# Patient Record
Sex: FEMALE | Race: WHITE | Employment: OTHER | URBAN - METROPOLITAN AREA
[De-identification: names, ages, dates, MRNs, and addresses within clinical notes are randomized per-mention and may not be internally consistent; named-entity substitution may affect disease eponyms.]

---

## 2017-02-02 ENCOUNTER — OFFICE VISIT (OUTPATIENT)
Dept: INTERNAL MEDICINE CLINIC | Age: 77
End: 2017-02-02

## 2017-02-02 VITALS
TEMPERATURE: 98 F | SYSTOLIC BLOOD PRESSURE: 125 MMHG | WEIGHT: 137 LBS | HEART RATE: 84 BPM | BODY MASS INDEX: 22.02 KG/M2 | RESPIRATION RATE: 16 BRPM | OXYGEN SATURATION: 96 % | DIASTOLIC BLOOD PRESSURE: 71 MMHG | HEIGHT: 66 IN

## 2017-02-02 DIAGNOSIS — I10 ESSENTIAL HYPERTENSION: ICD-10-CM

## 2017-02-02 DIAGNOSIS — J44.1 COPD EXACERBATION (HCC): Primary | ICD-10-CM

## 2017-02-02 RX ORDER — PREDNISONE 20 MG/1
TABLET ORAL
Qty: 30 TAB | Refills: 0 | Status: SHIPPED | OUTPATIENT
Start: 2017-02-02 | End: 2017-06-06 | Stop reason: ALTCHOICE

## 2017-02-02 RX ORDER — DOXYCYCLINE 100 MG/1
100 CAPSULE ORAL 2 TIMES DAILY
Qty: 20 CAP | Refills: 0 | Status: SHIPPED | OUTPATIENT
Start: 2017-02-02 | End: 2017-06-06 | Stop reason: ALTCHOICE

## 2017-02-02 NOTE — PROGRESS NOTES
HISTORY OF PRESENT ILLNESS  Kathi Gillespie is a 78 y.o. female. HPI     She complains of chest congestion and productive cough that have been present for the past 6 days. Pt took Elinda Free and stopped this on 2/1/17- she is not sure if Zpak helps. She returned from [facility] and was recently on a plane. Reports she washes her hands a lot when she is traveling. Pt will follow up with pulmonologist in 03/2017. She does not feel like in between antibiotics she has a lot of congestion. Pt notices pain with exercising and takes Albuterol prior to this. She uses Symbicort and Tudorza inhalers. Reports most of the time she can decrease exercise and push through this. Hypertension ROS: taking medications as instructed, no medication side effects noted, no TIA's, no chest pain on exertion, no dyspnea on exertion, no swelling of ankles. New concerns: Stable- bp was 125/71 in the office today. Reports she has a tear in left hip and will get injection. Review of Systems   HENT: Positive for congestion. Respiratory: Positive for cough and sputum production. All other systems reviewed and are negative. Physical Exam   Constitutional: She is oriented to person, place, and time. She appears well-developed and well-nourished. HENT:   Head: Normocephalic and atraumatic. Right Ear: External ear normal.   Left Ear: External ear normal.   Nose: Nose normal.   Mouth/Throat: Oropharynx is clear and moist.   Eyes: Conjunctivae and EOM are normal.   Neck: Normal range of motion. Neck supple. Carotid bruit is not present. No thyroid mass and no thyromegaly present. Cardiovascular: Normal rate, regular rhythm, S1 normal, S2 normal, normal heart sounds and intact distal pulses. Pulmonary/Chest: Effort normal. She has wheezes (mild). Abdominal: Soft. Normal appearance and bowel sounds are normal. There is no hepatosplenomegaly. There is no tenderness. Musculoskeletal: Normal range of motion.    Neurological: She is alert and oriented to person, place, and time. She has normal strength. No cranial nerve deficit or sensory deficit. Coordination normal.   Skin: Skin is warm, dry and intact. No abrasion and no rash noted. Psychiatric: She has a normal mood and affect. Her behavior is normal. Judgment and thought content normal.   Nursing note and vitals reviewed. ASSESSMENT and PLAN  Bibiana was seen today for nasal congestion. Diagnoses and all orders for this visit:    COPD exacerbation ([facility] [address]  I do not hear pneumonia although do hear some wheezes- pt to begin taking Doxycycline and Prednisone taper as directed. Pt has Doxy and Prednsione at home to have when she travels. Pt should start taking Doxy and Prednisone when she is away because Romana Pearson does not help. I told pt that if she develops illnesses every month she should discuss preventative antibiotics with pulmonologist. Pt is asymptomatic in between flares therefore she is not at this point yet. -     predniSONE (DELTASONE) 20 mg tablet; 3 qd for 4 days, 2 qd for 5 days, 1 qd for 5 days, 1/2 qd for 3 days  -     doxycycline (MONODOX) 100 mg capsule; Take 1 Cap by mouth two (2) times a day. Essential hypertension  BP is at goal. I do not recommend any change in medications. Advised pt that she should continue bp medications as her bp is not really low. I told pt that with age and decreased appetite/eating some pt's have to stop taking bp medication. reviewed medications and side effects in detail     Written by Germán Clarke, as dictated by Gigi Roger MD.     Current diagnosis and concerns discussed with pt at length. Understands risks and benefits or current treatment plan and medications and accepts the treatment and medication with any possible risks. Pt asks appropriate questions which were answered. Pt instructed to call with any concerns or problems.

## 2017-02-28 ENCOUNTER — OFFICE VISIT (OUTPATIENT)
Dept: INTERNAL MEDICINE CLINIC | Age: 77
End: 2017-02-28

## 2017-02-28 VITALS
HEART RATE: 58 BPM | WEIGHT: 137 LBS | SYSTOLIC BLOOD PRESSURE: 128 MMHG | DIASTOLIC BLOOD PRESSURE: 57 MMHG | OXYGEN SATURATION: 98 % | HEIGHT: 66 IN | RESPIRATION RATE: 18 BRPM | TEMPERATURE: 98 F | BODY MASS INDEX: 22.02 KG/M2

## 2017-02-28 DIAGNOSIS — J44.9 CHRONIC OBSTRUCTIVE PULMONARY DISEASE, UNSPECIFIED COPD TYPE (HCC): Primary | ICD-10-CM

## 2017-02-28 DIAGNOSIS — I10 ESSENTIAL HYPERTENSION: ICD-10-CM

## 2017-02-28 NOTE — PROGRESS NOTES
HISTORY OF PRESENT ILLNESS  April Gonzalez is a 78 y.o. female. HPI    At her 2/2/17 office visit she complained of chest congestion and productive cough. Pt was recently on a plane and took Zpak with no relief. I prescribed pt Doxycycline and Prednisone at this time. Pt emailed me on 2/6/17 reporting chest tightness and difficulty breathing after eating. She then emailed me on 2/15/17 stating she finished abx and steroids and still had a productive cough and scratchy throat and I told pt to begin taking Mucinex and use Vicks vapor rub. Today pt complains of a cough and shortness of breath. Pt saw pulmonologist on 2/7/17 who prescribed her Caralyn Laverne and she did not take this because it did not help in the past. Pt states that she took another round of Doxycycline two weeks ago. Reports her cough has gotten deeper and she has pain with coughing. Pt states that she gets short of breath when she is moving around. Her pulmonologist referred pt to cardiologist after pt reported exertional chest pain. Pt states she will have stress test performed. Reports she had an episode of left-sided chest pain when walking up the stairs. She states that this has happened 4 times. Pt states that this pain resolves when she lays on her left side. Hypertension ROS: taking medications as instructed, no medication side effects noted, no TIA's, no chest pain on exertion, no dyspnea on exertion, no swelling of ankles. New concerns: Stable- bp was 128/57 today in the office. Pt states her 9year old granddaughter in [place] was recently diagnosed with rhabdomyosarcoma. Pt wants to go down there to be with her and needs to get healthy. Review of Systems   Respiratory: Positive for cough and shortness of breath. All other systems reviewed and are negative. Physical Exam   Constitutional: She is oriented to person, place, and time. She appears well-developed and well-nourished. HENT:   Head: Normocephalic and atraumatic.    Right Ear: External ear normal.   Left Ear: External ear normal.   Nose: Nose normal.   Mouth/Throat: Oropharynx is clear and moist.   Eyes: Conjunctivae and EOM are normal.   Neck: Normal range of motion. Neck supple. Carotid bruit is not present. No thyroid mass and no thyromegaly present. Cardiovascular: Normal rate, regular rhythm, S1 normal, S2 normal, normal heart sounds and intact distal pulses. Pulmonary/Chest: Effort normal and breath sounds normal.   Abdominal: Soft. Normal appearance and bowel sounds are normal. There is no hepatosplenomegaly. There is no tenderness. Musculoskeletal: Normal range of motion. Neurological: She is alert and oriented to person, place, and time. She has normal strength. No cranial nerve deficit or sensory deficit. Coordination normal.   Skin: Skin is warm, dry and intact. No abrasion and no rash noted. Psychiatric: She has a normal mood and affect. Her behavior is normal. Judgment and thought content normal.   Nursing note and vitals reviewed. ASSESSMENT and PLAN  Bibiana was seen today for cough and shortness of breath. Diagnoses and all orders for this visit:    Chronic obstructive pulmonary disease, unspecified COPD type ([address]  I told pt that pulmonologist wanted her to take Itzel Mew as this would cover organisms that Doxycycline will not. Pt has already taken two rounds of Doxycycline and I suggested she take Zpak. Essential hypertension  BP is at goal. I do not recommend any change in medications. Advised pt that she should see cardiologist for exertional chest pain. reviewed medications and side effects in detail     Written by Storm Koyanagi, as dictated by Hair Bustamante MD.     Current diagnosis and concerns discussed with pt at length. Understands risks and benefits or current treatment plan and medications and accepts the treatment and medication with any possible risks. Pt asks appropriate questions which were answered.  Pt instructed to call with any concerns or problems.

## 2017-06-06 ENCOUNTER — OFFICE VISIT (OUTPATIENT)
Dept: INTERNAL MEDICINE CLINIC | Age: 77
End: 2017-06-06

## 2017-06-06 VITALS
HEIGHT: 66 IN | TEMPERATURE: 98 F | OXYGEN SATURATION: 97 % | DIASTOLIC BLOOD PRESSURE: 60 MMHG | BODY MASS INDEX: 20.54 KG/M2 | SYSTOLIC BLOOD PRESSURE: 104 MMHG | RESPIRATION RATE: 14 BRPM | HEART RATE: 74 BPM | WEIGHT: 127.8 LBS

## 2017-06-06 DIAGNOSIS — I10 ESSENTIAL HYPERTENSION: ICD-10-CM

## 2017-06-06 DIAGNOSIS — Z13.39 SCREENING FOR ALCOHOLISM: ICD-10-CM

## 2017-06-06 DIAGNOSIS — Z13.31 SCREENING FOR DEPRESSION: ICD-10-CM

## 2017-06-06 DIAGNOSIS — Z00.00 ROUTINE GENERAL MEDICAL EXAMINATION AT A HEALTH CARE FACILITY: ICD-10-CM

## 2017-06-06 DIAGNOSIS — Z00.00 MEDICARE ANNUAL WELLNESS VISIT, INITIAL: Primary | ICD-10-CM

## 2017-06-06 DIAGNOSIS — J44.9 CHRONIC OBSTRUCTIVE PULMONARY DISEASE, UNSPECIFIED COPD TYPE (HCC): ICD-10-CM

## 2017-06-06 DIAGNOSIS — E78.5 SERUM LIPIDS HIGH: ICD-10-CM

## 2017-06-06 DIAGNOSIS — I25.10 CORONARY ARTERY DISEASE INVOLVING NATIVE HEART WITHOUT ANGINA PECTORIS, UNSPECIFIED VESSEL OR LESION TYPE: ICD-10-CM

## 2017-06-06 DIAGNOSIS — Z71.89 ADVANCED CARE PLANNING/COUNSELING DISCUSSION: ICD-10-CM

## 2017-06-06 RX ORDER — METOPROLOL SUCCINATE 50 MG/1
TABLET, EXTENDED RELEASE ORAL
COMMUNITY
Start: 2017-04-21 | End: 2017-11-10 | Stop reason: ALTCHOICE

## 2017-06-06 RX ORDER — LISINOPRIL AND HYDROCHLOROTHIAZIDE 10; 12.5 MG/1; MG/1
1 TABLET ORAL DAILY
Qty: 90 TAB | Refills: 1 | Status: SHIPPED | OUTPATIENT
Start: 2017-06-06 | End: 2018-01-10 | Stop reason: SDUPTHER

## 2017-06-06 RX ORDER — ATORVASTATIN CALCIUM 20 MG/1
40 TABLET, FILM COATED ORAL DAILY
COMMUNITY
Start: 2017-04-24 | End: 2018-04-03

## 2017-06-06 NOTE — PATIENT INSTRUCTIONS
Medicare Part B Preventive Services Guidelines/Limitations Date last completed and Frequency Due Date   Bone Mass Measurement  (age 72 & older, biennial) Requires diagnosis related to osteoporosis or estrogen deficiency. Biennial benefit unless patient has history of long-term glucocorticoid tx or baseline is needed because initial test was by other method Completed 5/2015    Recommended every 2 years As recommended by your PCP or Specialist     Cardiovascular Screening Blood Tests (every 5 years)  Total cholesterol, HDL, Triglycerides Order as a panel if possible Completed 5/2016    As recommended by your PCP As recommended by your PCP or Specialist   Colorectal Cancer Screening  -Fecal occult blood test (annual)  -Flexible sigmoidoscopy (5y)  -Screening colonoscopy (10y)  -Barium Enema Age 49-80; After age [de-identified] if history of abnormal results Completed 8/31/2016     Recommended follow-up in 10 years As recommended by your PCP or Specialist     Counseling to Prevent Tobacco Use (up to 8 sessions per year)  - Counseling greater than 3 and up to 10 minutes  - Counseling greater than 10 minutes Patients must be asymptomatic of tobacco-related conditions to receive as preventive service N/A N/A   Diabetes Screening Tests (at least every 3 years, Medicare covers annually or at 6-month intervals for prediabetic patients)    Fasting blood sugar (FBS) or glucose tolerance test (GTT) Patient must be diagnosed with one of the following:  -Hypertension, Dyslipidemia, obesity, previous impaired FBS or GTT  Or any two of the following: overweight, FH of diabetes, age ? 72, history of gestational diabetes, birth of baby weighing more than 9 pounds Completed 11/2016    Recommended every 3 years for non-diabetics     As recommended by your PCP or Specialist     Glaucoma Screening (no USPSTF recommendation) Diabetes mellitus, family history, , age 48 or over,  American, age 72 or over Completed within the last year    Recommended annually As recommended by your PCP or Specialist   Seasonal Influenza Vaccination (annually)  Completed 9/2016    Recommended Annually Completed for 2016 flu season. TDAP Vaccination  As recommended by your PCP or Specialist    Recommended every 10 years As recommended by your PCP or Specialist   Zoster (Shingles) Vaccination Covered by Medicare Part D through the pharmacy- PCP provides prescription Never received    Recommended once over age 48  As recommended by your PCP or Specialist   Pneumococcal Vaccination (once after 72)  Pneumo 23- 1/2009  Recommended once over the age of 72    Prevnar 15- 11/2015 Recommended once over the age of 72 Complete        Complete   Screening Mammography (biennial age 54-69) Annually (age 36 or over) Completed 2/2017   As recommended by your PCP or Specialist     Screening Pap Tests and Pelvic Examination (up to age 79 and after 79 if unknown history or abnormal study last 8 years) Every 25 months except high risk As recommended by your PCP or Specialist   As recommended by your PCP or Specialist     Ultrasound Screening for Abdominal Aortic Aneurysm (AAA) (once) Patient must be referred through IPPE and not have had a screening for abdominal aortic aneurysm before under Medicare. Limited to patients who meet one of the following criteria:  - Men who are 73-68 years old and have smoked more than 100 cigarettes in their lifetime.  -Anyone with a FH of AAA  -Anyone recommended for screening by USPSTF Not indicated unless recommended by PCP   Not indicated unless recommended by PCP     Family Practice Management 2011    If you have any questions or concerns please feel free to contact me at [phone]. It was a pleasure meeting you today and participating in your healthcare.   Fabien Valentine RN

## 2017-06-06 NOTE — PROGRESS NOTES
HISTORY OF PRESENT ILLNESS  Albino García is a 78 y.o. female. HPI  Patient is coming in for follow up of chronic conditions. Patient reports that she had a triple bypass surgery on 3/22/2017. She was returning from a cruise in February and notes that she had SOB. She states that she had trouble breathing when climbing stairs and laid on her left side because it resolved the breathing. She reports that she took Albuterol and made an appointment for the pulmonologist who referred her to a cardiologist. Patient reports that she had trouble getting an appointment. She states that the specialist prescribed Nitroglycerin for the weekend. Patient however, still complained of chest pain and SOB. She had a normal stress test 2 years prior so specialist decided to do catheterization. This indicated 3 vessel disease and she was hospitalized until she had her triple bypass surgery. Patient reports that she attends cardiac rehab 3 days a week. Her SOB is resolved, but gets slightly winded when exerting energy (walking up hills). She reports that she is taking her medication regularly. Patient reports that she is eating and has an appetite. She reports that she has stopped Plavix and is taking 40 mg Aspirin. She is also taking statin. Hypertension ROS: taking medications as instructed, no medication side effects noted, no TIA's, no chest pain on exertion, no dyspnea on exertion, no swelling of ankles. New concerns:  Patient's BP in office today is 104/60. Hyperlipidemia:  Cardiovascular risk analysis - 78 y.o. female LDL goal is under 100. ROS: taking medications as instructed, no medication side effects noted, no TIA's, no chest pain on exertion, no dyspnea on exertion, no swelling of ankles. Tolerating meds, no myalgias or other side effects noted  New concerns: LDL was 117 on 5/9/2017. Patient declined treatment for depression.      Review of Systems   All other systems reviewed and are negative. Physical Exam   Constitutional: She is oriented to person, place, and time. She appears well-developed and well-nourished. HENT:   Head: Normocephalic and atraumatic. Right Ear: External ear normal.   Left Ear: External ear normal.   Nose: Nose normal.   Mouth/Throat: Oropharynx is clear and moist.   Eyes: Conjunctivae and EOM are normal.   Neck: Normal range of motion. Neck supple. Carotid bruit is not present. No thyroid mass and no thyromegaly present. Cardiovascular: Normal rate, regular rhythm, S1 normal, S2 normal, normal heart sounds and intact distal pulses. Pulmonary/Chest: Effort normal and breath sounds normal.   Abdominal: Soft. Normal appearance and bowel sounds are normal. There is no hepatosplenomegaly. There is no tenderness. Musculoskeletal: Normal range of motion. Neurological: She is alert and oriented to person, place, and time. She has normal strength. No cranial nerve deficit or sensory deficit. Coordination normal.   Skin: Skin is warm, dry and intact. No abrasion and no rash noted. Psychiatric: She has a normal mood and affect. Her behavior is normal. Judgment and thought content normal.   Nursing note and vitals reviewed. ASSESSMENT and Tarun Mitchell was seen today for follow-up. Diagnoses and all orders for this visit:    Coronary artery disease involving native heart without angina pectoris, unspecified vessel or lesion type  Improved with triple bypass and cardiac rehabe 3 days a week. Continue aspirin 40 mg and Lipitor 20mg. Chronic obstructive pulmonary disease, unspecified COPD type ([address]  Stable and her SOB has improved since triple bypass. Essential hypertension  BP is stable. Continue current meds. Serum lipids high  LDL was 117 May 2016. Recently at s/p triple bypass. Continue Lipitor. Other orders  -     lisinopril-hydroCHLOROthiazide (PRINZIDE, ZESTORETIC) 10-12.5 mg per tablet; Take 1 Tab by mouth daily.       lab results and schedule of future lab studies reviewed with patient  reviewed diet, exercise and weight control    Written by Starlene Kanner, as dictated by Diana Chavez MD.     Current diagnosis and concerns discussed with pt at length. Understands risks and benefits or current treatment plan and medications and accepts the treatment and medication with any possible risks. Pt asks appropriate questions which were answered. Pt instructed to call with any concerns or problems.

## 2017-06-06 NOTE — PROGRESS NOTES
Nurse Navigator Medicare Wellness Visit performed by JOSE ALFREDO Olivares    This is an Initial Dahlia Exam (AWV) (Performed 12 months after IPPE or effective date of Medicare Part B enrollment, Once in a lifetime)    I have reviewed the patient's medical history in detail and updated the computerized patient record. History     Past Medical History:   Diagnosis Date    Asthma 10/11/2012    GERD (gastroesophageal reflux disease) 4/1/2009    HTN 4/1/2009    Menopausal state 4/1/2009    Serum lipid, elevated 4/1/2009      Past Surgical History:   Procedure Laterality Date    COLONOSCOPY N/A 8/31/2016    COLONOSCOPY performed by Debbie Ward MD at [facility] HX CHOLECYSTECTOMY  2012     Current Outpatient Prescriptions   Medication Sig Dispense Refill    atorvastatin (LIPITOR) 20 mg tablet Take 40 mg by mouth daily.  metoprolol succinate (TOPROL-XL) 50 mg XL tablet       lisinopril-hydroCHLOROthiazide (PRINZIDE, ZESTORETIC) 10-12.5 mg per tablet Take 1 Tab by mouth daily. 90 Tab 1    KRILL OIL PO Take  by mouth daily.  budesonide-formoterol (SYMBICORT) 160-4.5 mcg/actuation HFA inhaler Take 2 Puffs by inhalation two (2) times a day. 3 Inhaler 1    magnesium 250 mg tab Take 500 mg by mouth daily.  aclidinium bromide (TUDORZA PRESSAIR) 400 mcg/actuation inhaler Take 1 Puff by inhalation.  cholecalciferol (VITAMIN D3) 1,000 unit tablet Take  by mouth daily.  albuterol (PROVENTIL HFA, VENTOLIN HFA) 90 mcg/actuation inhaler Take 2 Puffs by inhalation every four (4) hours as needed for Wheezing. 1 Inhaler 2    CALCIUM CARBONATE/VITAMIN D3 (CALCIUM + D PO) Take  by mouth.  ASPIRIN 81 mg chewable tablet Take 81 mg by mouth every other day.  lisinopril-hydroCHLOROthiazide (PRINZIDE, ZESTORETIC) 10-12.5 mg per tablet Take 1 Tab by mouth daily for 90 days.  90 Tab 1     Allergies   Allergen Reactions    Augmentin [Amoxicillin-Pot Clavulanate] Nausea Only     Family History   Problem Relation Age of Onset    Heart Attack Mother     Hypertension Mother     High Cholesterol Mother     Hypertension Father     Hypertension Brother      Social History   Substance Use Topics    Smoking status: Former Smoker     Packs/day: 1.00     Years: 20.00     Types: Cigarettes     Quit date: 1/1/1980    Smokeless tobacco: Never Used    Alcohol use 0.6 oz/week     1 Glasses of wine per week      Comment: daily glass of wine     Patient Active Problem List   Diagnosis Code    HTN (hypertension) I10    Serum lipids high E78.5    GERD (gastroesophageal reflux disease) K21.9    Menopausal state N95.1    Asthma J45.909    ACP (advance care planning) Z71.89    Chronic obstructive pulmonary disease (HCC) J44.9         Depression Risk Factor Screening:   Patient denies feelings of being down, depressed or hopeless at this time. Patient states that they have a strong support system within their family & friends. PHQ over the last two weeks 6/6/2017   Little interest or pleasure in doing things Not at all   Feeling down, depressed or hopeless Not at all   Total Score PHQ 2 0     Alcohol Risk Factor Screening: On any occasion during the past 3 months, have you had more than 3 drinks containing alcohol? No    Do you average more than 7 drinks per week? No    Functional Ability and Level of Safety:     Hearing Loss   normal-to-mild    Activities of Daily Living   Self-care. Patient states that she lives with her  in a private residence. Patient states independence in all ADLs & denies the use of assistive devices for ambulation. NN encouraged patient to continue and/ or introduce routine physical exercise into their daily routine as applicable & as recommended by PCP. Patient verbalized understanding & agreement to take this into consideration; however, patient shares that she had triple bypass surgery on 3/22/2017 & that she attends cardiac rehab 3 days a week. Patient adds that her shortness of breath has resolved, but she still gets slightly winded when exerting energy (i.e. walking up hills). Patient states that she will discuss this in more detail with PCP today. Requires assistance with:   ADL Assessment 6/6/2017   Feeding yourself No Help Needed   Getting from bed to chair No Help Needed   Getting dressed No Help Needed   Bathing or showering No Help Needed   Walk across the room (includes cane/walker) No Help Needed   Using the telphone No Help Needed   Taking your medications No Help Needed   Preparing meals No Help Needed   Managing money (expenses/bills) No Help Needed   Moderately strenuous housework (laundry) No Help Needed   Shopping for personal items (toiletries/medicines) No Help Needed   Shopping for groceries No Help Needed   Driving No Help Needed   Climbing a flight of stairs No Help Needed   Getting to places beyond walking distances No Help Needed       Fall Risk   Patient denies falls within the past year & verbalizes awareness of fall prevention strategies. Fall Risk Assessment, last 12 mths 6/6/2017   Able to walk? Yes   Fall in past 12 months? No     Abuse Screen   Patient is not abused    Review of Systems   Medicare Wellness Visit    Physical Examination     No exam data present    Evaluation of Cognitive Function:  Mood/affect:  happy  Appearance: age appropriate and casually dressed  Family member/caregiver input: None present; however, patient reports a strong support system. No exam performed today, Medicare Wellness Visit.     Patient Care Team:  Tracy Andersen MD as PCP - Rahel Cline MD (Pulmonary Disease)    Advice/Referrals/Counseling   Education and counseling provided:  End-of-Life planning (with patient's consent)  Pneumococcal Vaccine  Influenza Vaccine  Screening Mammography  Screening Pap and pelvic (covered once every 2 years)  Colorectal cancer screening tests  Bone mass measurement (DEXA)  Screening for glaucoma  tdap & shingles vaccinations      Assessment/Plan   1. A copy of patient's completed Advanced Medical Directive is on file in the patient's medical record. NN reviewed Advanced Medical Directive document with patient & patient denies the need for changes/ updates. 2. Patient is up to date on the following immunizations:  Immunization History   Administered Date(s) Administered    Influenza High Dose Vaccine PF 09/22/2016    Influenza Vaccine 10/01/2014    Pneumococcal Conjugate (PCV-13) 11/09/2015    Pneumococcal Vaccine (Unspecified Type) 01/01/2009   Patient confirmed the aforementioned preventative immunization dates are correct. Patient is unable to recall the date of their last tdap vaccine. NN encouraged patient to check home records & if information obtained, to please notify PCP's office with the details. Patient verbalized agreement. Patient denies receiving a shingles vaccine in the past & patient denies ever having a case of shingles in the past. NN encouraged patient to check home records & if information obtained, to please notify PCP's office with the details. Patient verbalized agreement. 3. Patient states that she follows the PCP's recommendations for the following screenings: Mammography (report on file from 2/2016), pap/ pelvic, DEXA scan (report on file from 5/2015) & colonoscopy (report on file from 8/31/2016 performed by Dr. Joycelyn Woods at [facility] with recommended screening follow-up in 10 years). Patient confirmed the aforementioned preventative screening dates. Patient reports completing a screening mammogram 2/2017 at the [facility]. [facility] faxed requesting a copy of patient's last screening mammography report with patient's verbal approval.     4. Patient wears corrective lenses. Patient reports having a routine eye exam & glaucoma screening within the last year performed by Dr. Moe Nesbitt.  [facility] faxed requesting a copy of patient's last eye exam with glaucoma screening with patient's verbal approval.     Patient verbalized understanding of all information discussed. Patient was given the opportunity to ask questions. Medication reconciliation completed by MA/ LPN and reviewed by PCP. Patient provided AVS which includes Medicare Wellness Preventative Screening Table.

## 2017-08-08 DIAGNOSIS — J44.1 COPD EXACERBATION (HCC): ICD-10-CM

## 2017-08-08 RX ORDER — DOXYCYCLINE 100 MG/1
100 CAPSULE ORAL 2 TIMES DAILY
Qty: 20 CAP | Refills: 0 | Status: SHIPPED | OUTPATIENT
Start: 2017-08-08 | End: 2017-11-10 | Stop reason: ALTCHOICE

## 2017-08-08 RX ORDER — PREDNISONE 20 MG/1
TABLET ORAL
Qty: 30 TAB | Refills: 0 | Status: SHIPPED | OUTPATIENT
Start: 2017-08-08 | End: 2017-11-10 | Stop reason: ALTCHOICE

## 2017-11-10 ENCOUNTER — OFFICE VISIT (OUTPATIENT)
Dept: INTERNAL MEDICINE CLINIC | Age: 77
End: 2017-11-10

## 2017-11-10 VITALS
SYSTOLIC BLOOD PRESSURE: 130 MMHG | WEIGHT: 127 LBS | HEART RATE: 84 BPM | OXYGEN SATURATION: 99 % | TEMPERATURE: 97.9 F | DIASTOLIC BLOOD PRESSURE: 82 MMHG | BODY MASS INDEX: 20.41 KG/M2 | RESPIRATION RATE: 12 BRPM | HEIGHT: 66 IN

## 2017-11-10 DIAGNOSIS — J44.1 COPD EXACERBATION (HCC): ICD-10-CM

## 2017-11-10 DIAGNOSIS — I10 ESSENTIAL HYPERTENSION: ICD-10-CM

## 2017-11-10 DIAGNOSIS — J40 BRONCHITIS: Primary | ICD-10-CM

## 2017-11-10 RX ORDER — LEVOFLOXACIN 500 MG/1
500 TABLET, FILM COATED ORAL DAILY
Qty: 10 TAB | Refills: 0 | Status: SHIPPED | OUTPATIENT
Start: 2017-11-10 | End: 2018-01-15 | Stop reason: ALTCHOICE

## 2017-11-10 RX ORDER — PREDNISONE 20 MG/1
TABLET ORAL
Qty: 30 TAB | Refills: 0 | Status: SHIPPED | OUTPATIENT
Start: 2017-11-10 | End: 2018-01-15 | Stop reason: ALTCHOICE

## 2017-11-10 NOTE — PROGRESS NOTES
HISTORY OF PRESENT ILLNESS  Luis Blunt is a 78 y.o. female. HPI  Presents with complaints of persistent chest congestion and loose cough productive of yellow sputum for the past 2 weeks. Symptoms began while she was traveling out of town and she started with her Doxycycline and Prednisone course shortly after symptoms began. Feels like chest is not as tight but cough has remained very loose and productive. Completed 10 day course of Doxycycline 2 days ago. Denies fever, chills. Has not needed to use her Albuterol inhaler with this illness but takes her Symbicort and [place] daily. Was taking Mucinex initially but stopped after several days. At one time while coughing, she felt flushed and took her blood pressure with home cuff 160/94. She has been concerned about elevation of blood pressure but denies severe headache, vision changes, chest pain. Has not missed any of her blood pressure medication. Review of Systems   Constitutional: Positive for malaise/fatigue. Negative for chills and fever. HENT: Negative for congestion and sore throat. Respiratory: Positive for cough and sputum production. Negative for shortness of breath and wheezing. Cardiovascular: Negative for chest pain, palpitations and leg swelling. Genitourinary: Negative for dysuria and frequency. Musculoskeletal: Negative for joint pain and myalgias. Neurological: Negative for dizziness and headaches. /78 (BP 1 Location: Left arm, BP Patient Position: Sitting)  Pulse 84  Temp 97.9 °F (36.6 °C) (Oral)   Resp 12  Ht 5' 6\" (1.676 m)  Wt 127 lb (57.6 kg)  SpO2 99%  BMI 20.5 kg/m2   Blood pressure rechecked with manual cuff 130/82  Physical Exam   Constitutional: She is oriented to person, place, and time. She appears well-developed and well-nourished. HENT:   Head: Normocephalic and atraumatic.    Right Ear: External ear normal.   Left Ear: External ear normal.   Nose: Nose normal.   Mouth/Throat: Oropharynx is clear and moist.   Eyes: Conjunctivae are normal.   Neck: Normal range of motion. Neck supple. No thyromegaly present. Cardiovascular: Normal rate and regular rhythm. Pulmonary/Chest: Effort normal. She has no wheezes. She has no rales. Loose cough with upper chest congestion   Lymphadenopathy:     She has no cervical adenopathy. Neurological: She is alert and oriented to person, place, and time. Skin: Skin is warm and dry. Psychiatric: She has a normal mood and affect. Her behavior is normal.   Nursing note and vitals reviewed. ASSESSMENT and PLAN  Diagnoses and all orders for this visit:    1. Bronchitis -- has not resolved with 10 day course of Doxycycline; will treat with Levaquin for antibiotic failure; resume use of Mucinex. -     levoFLOXacin (LEVAQUIN) 500 mg tablet; Take 1 Tab by mouth daily. 2. COPD exacerbation (HCC)  -     predniSONE (DELTASONE) 20 mg tablet; 3 qd for 4 days, 2 qd for 5 days, 1 qd for 5 days, 1/2 qd for 3 days    3. Hypertension -- obtained elevated reading at home; may be related to current illness; advised her to bring home BP cuff to her next appointment to check against office readings.       lab results and schedule of future lab studies reviewed with patient  reviewed diet, exercise and weight control  reviewed medications and side effects in detail

## 2017-11-10 NOTE — PATIENT INSTRUCTIONS
Bronchitis: Care Instructions  Your Care Instructions    Bronchitis is inflammation of the bronchial tubes, which carry air to the lungs. The tubes swell and produce mucus, or phlegm. The mucus and inflamed bronchial tubes make you cough. You may have trouble breathing. Most cases of bronchitis are caused by viruses like those that cause colds. Antibiotics usually do not help and they may be harmful. Bronchitis usually develops rapidly and lasts about 2 to 3 weeks in otherwise healthy people. Follow-up care is a key part of your treatment and safety. Be sure to make and go to all appointments, and call your doctor if you are having problems. It's also a good idea to know your test results and keep a list of the medicines you take. How can you care for yourself at home? · Take all medicines exactly as prescribed. Call your doctor if you think you are having a problem with your medicine. · Get some extra rest.  · Take an over-the-counter pain medicine, such as acetaminophen (Tylenol), ibuprofen (Advil, Motrin), or naproxen (Aleve) to reduce fever and relieve body aches. Read and follow all instructions on the label. · Do not take two or more pain medicines at the same time unless the doctor told you to. Many pain medicines have acetaminophen, which is Tylenol. Too much acetaminophen (Tylenol) can be harmful. · Take an over-the-counter cough medicine that contains dextromethorphan to help quiet a dry, hacking cough so that you can sleep. Avoid cough medicines that have more than one active ingredient. Read and follow all instructions on the label. · Breathe moist air from a humidifier, hot shower, or sink filled with hot water. The heat and moisture will thin mucus so you can cough it out. · Do not smoke. Smoking can make bronchitis worse. If you need help quitting, talk to your doctor about stop-smoking programs and medicines. These can increase your chances of quitting for good.   When should you call for help? Call 911 anytime you think you may need emergency care. For example, call if:  ? · You have severe trouble breathing. ?Call your doctor now or seek immediate medical care if:  ? · You have new or worse trouble breathing. ? · You cough up dark brown or bloody mucus (sputum). ? · You have a new or higher fever. ? · You have a new rash. ? Watch closely for changes in your health, and be sure to contact your doctor if:  ? · You cough more deeply or more often, especially if you notice more mucus or a change in the color of your mucus. ? · You are not getting better as expected. Where can you learn more? Go to http://maria r-mason.info/. Enter H333 in the search box to learn more about \"Bronchitis: Care Instructions. \"  Current as of: May 12, 2017  Content Version: 11.4  © 0423-4402 [employer]. Care instructions adapted under license by [employer] (which disclaims liability or warranty for this information). If you have questions about a medical condition or this instruction, always ask your healthcare professional. [place] [address] any warranty or liability for your use of this information. Chronic Obstructive Pulmonary Disease (COPD): Care Instructions  Your Care Instructions    Chronic obstructive pulmonary disease (COPD) is a general term for a group of lung diseases, including emphysema and chronic bronchitis. People with COPD have decreased airflow in and out of the lungs, which makes it hard to breathe. The airways also can get clogged with thick mucus. Cigarette smoking is a major cause of COPD. Although there is no cure for COPD, you can slow its progress. Following your treatment plan and taking care of yourself can help you feel better and live longer. Follow-up care is a key part of your treatment and safety. Be sure to make and go to all appointments, and call your doctor if you are having problems.  It's also a good idea to know your test results and keep a list of the medicines you take. How can you care for yourself at home? ?Staying healthy  ? · Do not smoke. This is the most important step you can take to prevent more damage to your lungs. If you need help quitting, talk to your doctor about stop-smoking programs and medicines. These can increase your chances of quitting for good. ? · Avoid colds and flu. Get a pneumococcal vaccine shot. If you have had one before, ask your doctor whether you need a second dose. Get the flu vaccine every fall. If you must be around people with colds or the flu, wash your hands often. ? · Avoid secondhand smoke, air pollution, and high altitudes. Also avoid cold, dry air and hot, humid air. Stay at home with your windows closed when air pollution is bad. ?Medicines and oxygen therapy  ? · Take your medicines exactly as prescribed. Call your doctor if you think you are having a problem with your medicine. ? · You may be taking medicines such as:  ¨ Bronchodilators. These help open your airways and make breathing easier. Bronchodilators are either short-acting (work for 6 to 9 hours) or long-acting (work for 24 hours). You inhale most bronchodilators, so they start to act quickly. Always carry your quick-relief inhaler with you in case you need it while you are away from home. ¨ Corticosteroids (prednisone, budesonide). These reduce airway inflammation. They come in pill or inhaled form. You must take these medicines every day for them to work well. ? · A spacer may help you get more inhaled medicine to your lungs. Ask your doctor or pharmacist if a spacer is right for you. If it is, ask how to use it properly. ? · Do not take any vitamins, over-the-counter medicine, or herbal products without talking to your doctor first.   ? · If your doctor prescribed antibiotics, take them as directed. Do not stop taking them just because you feel better.  You need to take the full course of antibiotics. ? · Oxygen therapy boosts the amount of oxygen in your blood and helps you breathe easier. Use the flow rate your doctor has recommended, and do not change it without talking to your doctor first.   Activity  ? · Get regular exercise. Walking is an easy way to get exercise. Start out slowly, and walk a little more each day. ? · Pay attention to your breathing. You are exercising too hard if you cannot talk while you are exercising. ? · Take short rest breaks when doing household chores and other activities. ? · Learn breathing methods-such as breathing through pursed lips-to help you become less short of breath. ? · If your doctor has not set you up with a pulmonary rehabilitation program, talk to him or her about whether rehab is right for you. Rehab includes exercise programs, education about your disease and how to manage it, help with diet and other changes, and emotional support. Diet  ? · Eat regular, healthy meals. Use bronchodilators about 1 hour before you eat to make it easier to eat. Eat several small meals instead of three large ones. Drink beverages at the end of the meal. Avoid foods that are hard to chew. ? · Eat foods that contain protein so that you do not lose muscle mass. ? · Talk with your doctor if you gain too much weight or if you lose weight without trying. ?Mental health  ? · Talk to your family, friends, or a therapist about your feelings. It is normal to feel frightened, angry, hopeless, helpless, and even guilty. Talking openly about bad feelings can help you cope. If these feelings last, talk to your doctor. When should you call for help? Call 911 anytime you think you may need emergency care. For example, call if:  ? · You have severe trouble breathing. ?Call your doctor now or seek immediate medical care if:  ? · You have new or worse trouble breathing. ? · You cough up blood. ? · You have a fever. ? Watch closely for changes in your health, and be sure to contact your doctor if:  ? · You cough more deeply or more often, especially if you notice more mucus or a change in the color of your mucus. ? · You have new or worse swelling in your legs or belly. ? · You are not getting better as expected. Where can you learn more? Go to http://maria r-mason.info/. Ritika Donato in the search box to learn more about \"Chronic Obstructive Pulmonary Disease (COPD): Care Instructions. \"  Current as of: May 12, 2017  Content Version: 11.4  © 9397-1031 [employer]. Care instructions adapted under license by [employer] (which disclaims liability or warranty for this information). If you have questions about a medical condition or this instruction, always ask your healthcare professional. [address] any warranty or liability for your use of this information.

## 2017-11-10 NOTE — MR AVS SNAPSHOT
Visit Information Date & Time Provider Department Dept. Phone Encounter #  
 11/10/2017  3:40 PM Christian Bernardo NP [facility] [phone] [id number] Upcoming Health Maintenance Date Due  
 GLAUCOMA SCREENING Q2Y 4/21/2017 Influenza Age 5 to Adult 8/1/2017 MEDICARE YEARLY EXAM 6/7/2018 DTaP/Tdap/Td series (2 - Td) 6/17/2026 COLONOSCOPY 8/31/2026 Allergies as of 11/10/2017  Review Complete On: 11/10/2017 By: Christian Bernardo NP Severity Noted Reaction Type Reactions Augmentin [Amoxicillin-pot Clavulanate]  08/25/2016    Nausea Only Current Immunizations  Never Reviewed Name Date Influenza High Dose Vaccine PF 9/22/2016 Influenza Vaccine 10/1/2014 Pneumococcal Conjugate (PCV-13) 11/9/2015 Pneumococcal Vaccine (Unspecified Type) 1/1/2009 Not reviewed this visit You Were Diagnosed With   
  
 Codes Comments Bronchitis    -  Primary ICD-10-CM: Z14 ICD-9-CM: 826 [facility]     ICD-10-CM: J44.1 ICD-9-CM: 491.21 Vitals BP Pulse Temp Resp Height(growth percentile) Weight(growth percentile) 150/78 (BP 1 Location: Left arm, BP Patient Position: Sitting) 84 97.9 °F (36.6 °C) (Oral) 12 5' 6\" (1.676 m) 127 lb (57.6 kg) SpO2 BMI OB Status Smoking Status 99% 20.5 kg/m2 Postmenopausal Former Smoker BMI and BSA Data Body Mass Index Body Surface Area 20.5 kg/m 2 1.64 m 2 Preferred Pharmacy Pharmacy Name Phone [facility] [address], [address] [phone] Your Updated Medication List  
  
   
This list is accurate as of: 11/10/17  4:11 PM.  Always use your most recent med list.  
  
  
  
  
 albuterol 90 mcg/actuation inhaler Commonly known as:  PROVENTIL HFA, VENTOLIN HFA, PROAIR HFA Take 2 Puffs by inhalation every four (4) hours as needed for Wheezing. aspirin 81 mg chewable tablet Take 81 mg by mouth every other day. atorvastatin 20 mg tablet Commonly known as:  LIPITOR Take 40 mg by mouth daily. budesonide-formoterol 160-4.5 mcg/actuation Hfaa Commonly known as:  SYMBICORT Take 2 Puffs by inhalation two (2) times a day. CALCIUM + D PO Take  by mouth. KRILL OIL PO Take  by mouth daily. levoFLOXacin 500 mg tablet Commonly known as:  Threasa Mince Take 1 Tab by mouth daily. * lisinopril-hydroCHLOROthiazide 10-12.5 mg per tablet Commonly known as:  Katrinka Fat Take 1 Tab by mouth daily for 90 days. * lisinopril-hydroCHLOROthiazide 10-12.5 mg per tablet Commonly known as:  Katrinka Fat Take 1 Tab by mouth daily. magnesium 250 mg Tab Take 500 mg by mouth daily. predniSONE 20 mg tablet Commonly known as:  DELTASONE  
3 qd for 4 days, 2 qd for 5 days, 1 qd for 5 days, 1/2 qd for 3 days TUDORZA PRESSAIR 400 mcg/actuation inhaler Generic drug:  aclidinium bromide Take 1 Puff by inhalation. VITAMIN D3 1,000 unit tablet Generic drug:  cholecalciferol Take  by mouth daily. * Notice: This list has 2 medication(s) that are the same as other medications prescribed for you. Read the directions carefully, and ask your doctor or other care provider to review them with you. Prescriptions Printed Refills  
 predniSONE (DELTASONE) 20 mg tablet 0 Sig: 3 qd for 4 days, 2 qd for 5 days, 1 qd for 5 days, 1/2 qd for 3 days Class: Print Prescriptions Sent to Pharmacy Refills  
 levoFLOXacin (LEVAQUIN) 500 mg tablet 0 Sig: Take 1 Tab by mouth daily. Class: Normal  
 Pharmacy: [address], [address] [place] [address] Rd[address] [facility] Ph #: [phone] Route: Oral  
  
Patient Instructions Bronchitis: Care Instructions Your Care Instructions Bronchitis is inflammation of the bronchial tubes, which carry air to the lungs. The tubes swell and produce mucus, or phlegm. The mucus and inflamed bronchial tubes make you cough. You may have trouble breathing. Most cases of bronchitis are caused by viruses like those that cause colds. Antibiotics usually do not help and they may be harmful. Bronchitis usually develops rapidly and lasts about 2 to 3 weeks in otherwise healthy people. Follow-up care is a key part of your treatment and safety. Be sure to make and go to all appointments, and call your doctor if you are having problems. It's also a good idea to know your test results and keep a list of the medicines you take. How can you care for yourself at home? · Take all medicines exactly as prescribed. Call your doctor if you think you are having a problem with your medicine. · Get some extra rest. 
· Take an over-the-counter pain medicine, such as acetaminophen (Tylenol), ibuprofen (Advil, Motrin), or naproxen (Aleve) to reduce fever and relieve body aches. Read and follow all instructions on the label. · Do not take two or more pain medicines at the same time unless the doctor told you to. Many pain medicines have acetaminophen, which is Tylenol. Too much acetaminophen (Tylenol) can be harmful. · Take an over-the-counter cough medicine that contains dextromethorphan to help quiet a dry, hacking cough so that you can sleep. Avoid cough medicines that have more than one active ingredient. Read and follow all instructions on the label. · Breathe moist air from a humidifier, hot shower, or sink filled with hot water. The heat and moisture will thin mucus so you can cough it out. · Do not smoke. Smoking can make bronchitis worse. If you need help quitting, talk to your doctor about stop-smoking programs and medicines. These can increase your chances of quitting for good. When should you call for help? Call 911 anytime you think you may need emergency care. For example, call if: 
? · You have severe trouble breathing. ?Call your doctor now or seek immediate medical care if: 
? · You have new or worse trouble breathing. ? · You cough up dark brown or bloody mucus (sputum). ? · You have a new or higher fever. ? · You have a new rash. ? Watch closely for changes in your health, and be sure to contact your doctor if: 
? · You cough more deeply or more often, especially if you notice more mucus or a change in the color of your mucus. ? · You are not getting better as expected. Where can you learn more? Go to http://maria r-mason.info/. Enter H333 in the search box to learn more about \"Bronchitis: Care Instructions. \" Current as of: May 12, 2017 Content Version: 11.4 © 9965-4496 [employer]. Care instructions adapted under license by [employer] (which disclaims liability or warranty for this information). If you have questions about a medical condition or this instruction, always ask your healthcare professional. [place] [address] any warranty or liability for your use of this information. Chronic Obstructive Pulmonary Disease (COPD): Care Instructions Your Care Instructions Chronic obstructive pulmonary disease (COPD) is a general term for a group of lung diseases, including emphysema and chronic bronchitis. People with COPD have decreased airflow in and out of the lungs, which makes it hard to breathe. The airways also can get clogged with thick mucus. Cigarette smoking is a major cause of COPD. Although there is no cure for COPD, you can slow its progress. Following your treatment plan and taking care of yourself can help you feel better and live longer. Follow-up care is a key part of your treatment and safety. Be sure to make and go to all appointments, and call your doctor if you are having problems. It's also a good idea to know your test results and keep a list of the medicines you take. How can you care for yourself at home? ?Staying healthy ? · Do not smoke. This is the most important step you can take to prevent more damage to your lungs. If you need help quitting, talk to your doctor about stop-smoking programs and medicines. These can increase your chances of quitting for good. ? · Avoid colds and flu. Get a pneumococcal vaccine shot. If you have had one before, ask your doctor whether you need a second dose. Get the flu vaccine every fall. If you must be around people with colds or the flu, wash your hands often. ? · Avoid secondhand smoke, air pollution, and high altitudes. Also avoid cold, dry air and hot, humid air. Stay at home with your windows closed when air pollution is bad. ?Medicines and oxygen therapy ? · Take your medicines exactly as prescribed. Call your doctor if you think you are having a problem with your medicine. ? · You may be taking medicines such as: ¨ Bronchodilators. These help open your airways and make breathing easier. Bronchodilators are either short-acting (work for 6 to 9 hours) or long-acting (work for 24 hours). You inhale most bronchodilators, so they start to act quickly. Always carry your quick-relief inhaler with you in case you need it while you are away from home. ¨ Corticosteroids (prednisone, budesonide). These reduce airway inflammation. They come in pill or inhaled form. You must take these medicines every day for them to work well. ? · A spacer may help you get more inhaled medicine to your lungs. Ask your doctor or pharmacist if a spacer is right for you. If it is, ask how to use it properly. ? · Do not take any vitamins, over-the-counter medicine, or herbal products without talking to your doctor first.  
? · If your doctor prescribed antibiotics, take them as directed. Do not stop taking them just because you feel better. You need to take the full course of antibiotics.   
? · Oxygen therapy boosts the amount of oxygen in your blood and helps you breathe easier. Use the flow rate your doctor has recommended, and do not change it without talking to your doctor first.  
Activity ? · Get regular exercise. Walking is an easy way to get exercise. Start out slowly, and walk a little more each day. ? · Pay attention to your breathing. You are exercising too hard if you cannot talk while you are exercising. ? · Take short rest breaks when doing household chores and other activities. ? · Learn breathing methods-such as breathing through pursed lips-to help you become less short of breath. ? · If your doctor has not set you up with a pulmonary rehabilitation program, talk to him or her about whether rehab is right for you. Rehab includes exercise programs, education about your disease and how to manage it, help with diet and other changes, and emotional support. Diet ? · Eat regular, healthy meals. Use bronchodilators about 1 hour before you eat to make it easier to eat. Eat several small meals instead of three large ones. Drink beverages at the end of the meal. Avoid foods that are hard to chew. ? · Eat foods that contain protein so that you do not lose muscle mass. ? · Talk with your doctor if you gain too much weight or if you lose weight without trying. ?Mental health ? · Talk to your family, friends, or a therapist about your feelings. It is normal to feel frightened, angry, hopeless, helpless, and even guilty. Talking openly about bad feelings can help you cope. If these feelings last, talk to your doctor. When should you call for help? Call 911 anytime you think you may need emergency care. For example, call if: 
? · You have severe trouble breathing. ?Call your doctor now or seek immediate medical care if: 
? · You have new or worse trouble breathing. ? · You cough up blood. ? · You have a fever. ? Watch closely for changes in your health, and be sure to contact your doctor if: ? · You cough more deeply or more often, especially if you notice more mucus or a change in the color of your mucus. ? · You have new or worse swelling in your legs or belly. ? · You are not getting better as expected. Where can you learn more? Go to http://maria r-mason.info/. Ryan Singh in the search box to learn more about \"Chronic Obstructive Pulmonary Disease (COPD): Care Instructions. \" Current as of: May 12, 2017 Content Version: 11.4 © 3080-6021 "[employer]". Care instructions adapted under license by [employer] (which disclaims liability or warranty for this information). If you have questions about a medical condition or this instruction, always ask your healthcare professional. [address] any warranty or liability for your use of this information. Introducing [facility]! Dear Khai Macias: Thank you for requesting a [employer] account. Our records indicate that you already have an active [employer] account. You can access your account anytime at [email or web address] Did you know that you can access your hospital and ER discharge instructions at any time in [employer]? You can also review all of your test results from your hospital stay or ER visit. Additional Information If you have questions, please visit the Frequently Asked Questions section of the [employer] website at [email or web address] Remember, [employer] is NOT to be used for urgent needs. For medical emergencies, dial 911. Now available from your iPhone and Android! Please provide this summary of care documentation to your next provider. Your primary care clinician is listed as Surjit Edouard. If you have any questions after today's visit, please call [phone].

## 2018-01-10 NOTE — TELEPHONE ENCOUNTER
Patient needs a refill on her lisinopril-hydroCHLOROthiazide (PRINZIDE, ZESTORETIC) 10-12.5 mg per tablet  Call into the [facility] at [phone]  Has appt this month

## 2018-01-11 RX ORDER — LISINOPRIL AND HYDROCHLOROTHIAZIDE 10; 12.5 MG/1; MG/1
TABLET ORAL
Qty: 90 TAB | Refills: 1 | Status: SHIPPED | OUTPATIENT
Start: 2018-01-11 | End: 2018-02-14 | Stop reason: SDUPTHER

## 2018-01-11 RX ORDER — LISINOPRIL AND HYDROCHLOROTHIAZIDE 10; 12.5 MG/1; MG/1
1 TABLET ORAL DAILY
Qty: 90 TAB | Refills: 0 | Status: SHIPPED | OUTPATIENT
Start: 2018-01-11 | End: 2020-09-24

## 2018-01-15 ENCOUNTER — OFFICE VISIT (OUTPATIENT)
Dept: INTERNAL MEDICINE CLINIC | Age: 78
End: 2018-01-15

## 2018-01-15 ENCOUNTER — HOSPITAL ENCOUNTER (OUTPATIENT)
Dept: LAB | Age: 78
Discharge: HOME | End: 2018-01-15
Payer: MEDICARE

## 2018-01-15 VITALS
RESPIRATION RATE: 14 BRPM | TEMPERATURE: 98.1 F | SYSTOLIC BLOOD PRESSURE: 126 MMHG | OXYGEN SATURATION: 97 % | WEIGHT: 126.8 LBS | BODY MASS INDEX: 20.38 KG/M2 | HEART RATE: 78 BPM | HEIGHT: 66 IN | DIASTOLIC BLOOD PRESSURE: 64 MMHG

## 2018-01-15 DIAGNOSIS — I25.10 CORONARY ARTERY DISEASE INVOLVING NATIVE HEART WITHOUT ANGINA PECTORIS, UNSPECIFIED VESSEL OR LESION TYPE: ICD-10-CM

## 2018-01-15 DIAGNOSIS — R73.02 GLUCOSE INTOLERANCE (IMPAIRED GLUCOSE TOLERANCE): ICD-10-CM

## 2018-01-15 DIAGNOSIS — I10 ESSENTIAL HYPERTENSION: ICD-10-CM

## 2018-01-15 DIAGNOSIS — Z00.00 MEDICARE ANNUAL WELLNESS VISIT, SUBSEQUENT: Primary | ICD-10-CM

## 2018-01-15 DIAGNOSIS — J44.9 CHRONIC OBSTRUCTIVE PULMONARY DISEASE, UNSPECIFIED COPD TYPE (HCC): ICD-10-CM

## 2018-01-15 DIAGNOSIS — E78.5 SERUM LIPIDS HIGH: ICD-10-CM

## 2018-01-15 PROCEDURE — 36415 COLL VENOUS BLD VENIPUNCTURE: CPT

## 2018-01-15 PROCEDURE — 80061 LIPID PANEL: CPT

## 2018-01-15 PROCEDURE — 80053 COMPREHEN METABOLIC PANEL: CPT

## 2018-01-15 PROCEDURE — 83036 HEMOGLOBIN GLYCOSYLATED A1C: CPT

## 2018-01-15 PROCEDURE — 85027 COMPLETE CBC AUTOMATED: CPT

## 2018-01-15 RX ORDER — PREDNISONE 20 MG/1
TABLET ORAL
Qty: 30 TAB | Refills: 0 | Status: SHIPPED | OUTPATIENT
Start: 2018-01-15 | End: 2018-02-14 | Stop reason: ALTCHOICE

## 2018-01-15 RX ORDER — DOXYCYCLINE 100 MG/1
100 CAPSULE ORAL 2 TIMES DAILY
Qty: 20 CAP | Refills: 0 | Status: SHIPPED | OUTPATIENT
Start: 2018-01-15 | End: 2018-02-15 | Stop reason: ALTCHOICE

## 2018-01-15 NOTE — PROGRESS NOTES
HISTORY OF PRESENT ILLNESS  Zahira Westfall is a 78 y.o. female. HPI   Last here 6/6/17. Pt is here for routine care. Pt will be going on a 2 week cruise   Pt would like rx for doxycycline and prednisone  Ordered doxycycline and prednisone    Pt c/o ecchymosis to BL arms  Pt states that slight bumps cause ecchymosis   However, pt denies having any recent episodes of epistaxis   Pt has been taking ASA 81mg daily for several years  Pt attributes her epistaxis to taking ASA  Discussed epistaxis possibly being d/t broken blood vessel  Discussed ASA contributing to her current sx    Pt c/o blurred vision    Pt c/o burning in BLE x before surgery on 3/22/17  Pt states that it feels as though she is walking on cardboard  Pt states that she has been walking quite slowly as a result   Pt wonders if she has neuropathy   Pt held her lipitor with no improvement to sx  Discussed importance of seeing a neuro if her sx exacerbate     Pt follows with Dr. Isra York (cardio) and Dr. Gaby Couch (surg)  Pt had 2 unremarkable holter monitors and a stress test in the past   Pt has ASHLEY testing scheduled for 1/18/18  Cardio called, listened to her foot sx and decreased her lipitor to 20mg daily   However, pt is no longer taking lipitor  Pt has not noticed a difference in her foot sx x stopping lipitor     Pt denies having any recent SOB or chest tightness   Pt states that her breathing is much improved   Pt takes symbicort BID for her breathing, which helps     Hyperlipidemia:  Pt has not been taking her lipitor 20mg recently   Pt is not amenable to taking lipitor   Cardiovascular risk analysis - LDL goal is under 100. ROS: no TIA's, no chest pain on exertion, no dyspnea on exertion, no swelling of ankles.  Tolerating meds, no myalgias or other side effects noted    Hypertension ROS: Continues lisinopril-HCTZ 10-12.5mg daily  Taking medications as instructed, no medication side effects noted, no TIA's, no chest pain on exertion, no dyspnea on exertion, no swelling of ankles. New concerns: BP is 126/64 today. 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 Respiratory: Negative for shortness of breath. Cardiovascular: Negative for chest pain. Musculoskeletal: Positive for myalgias. Physical Exam   Constitutional: She is oriented to person, place, and time. She appears well-developed and well-nourished. No distress. HENT:   Head: Normocephalic and atraumatic. Right Ear: External ear normal.   Left Ear: External ear normal.   Nose: Nose normal.   Mouth/Throat: Oropharynx is clear and moist. No oropharyngeal exudate. Eyes: Conjunctivae and EOM are normal. Right eye exhibits no discharge. Left eye exhibits no discharge. No scleral icterus. Neck: Normal range of motion. Neck supple. No JVD present. No tracheal deviation present. No thyromegaly present. Cardiovascular: Normal rate, regular rhythm, normal heart sounds and intact distal pulses. Exam reveals no gallop and no friction rub. No murmur heard. Pulmonary/Chest: Effort normal and breath sounds normal. No stridor. No respiratory distress. She has no wheezes. She has no rales. She exhibits no tenderness. Abdominal: Bowel sounds are normal.   Musculoskeletal: Normal range of motion. She exhibits no edema, tenderness or deformity. Lymphadenopathy:     She has no cervical adenopathy. Neurological: She is alert and oriented to person, place, and time. She has normal reflexes. No cranial nerve deficit. She exhibits normal muscle tone. Coordination normal.   Skin: Skin is warm and dry. No rash noted. She is not diaphoretic. No erythema. No pallor. Psychiatric: She has a normal mood and affect. Her behavior is normal. Judgment and thought content normal.   Nursing note and vitals reviewed.       ASSESSMENT and PLAN    ICD-10-CM ICD-9-CM    1. Essential hypertension    Stable. Continue lisinopril-HCTZ 10-12.5mg daily. I10 401.9    2. Serum lipids high    Her lipitor was decreased to 20mg daily per cardio. However, pt has not been taking lipitor 20mg daily, as she believes they are responsible for her current foot sx. Will check cholesterol levels today. S36.4 023.0 METABOLIC PANEL, COMPREHENSIVE      LIPID PANEL   3. Chronic obstructive pulmonary disease, unspecified COPD type ([facility]    Breathing is stable with symbicort. J44.9 496 doxycycline (MONODOX) 100 mg capsule      predniSONE (DELTASONE) 20 mg tablet      CBC W/O DIFF   4. Coronary artery disease involving native heart without angina pectoris, unspecified vessel or lesion type    Continues ASA 81mg daily. Pt notes some ecchymosis on this medication. I25.10 414.01    5. Glucose intolerance (impaired glucose tolerance)    Will check a1c today. R73.02 790.22 HEMOGLOBIN A1C WITH EAG     lab results and schedule of future lab studies reviewed with patient  reviewed diet, exercise and weight control  reviewed medications and side effects in detail  use of aspirin to prevent MI and TIA's discussed     Scribed by [address], as dictated by Dr. Lowery Castleman. Current diagnosis and concerns discussed with pt at length. Pt understands risks and benefits or current treatment plan and medications, and accepts the treatment and medication with any possible risks. Pt asks appropriate questions, which were answered. Pt was instructed to call with any concerns or problems.

## 2018-01-16 LAB
ALBUMIN SERPL-MCNC: 4.4 G/DL (ref 3.5–4.8)
ALBUMIN/GLOB SERPL: 1.9 {RATIO} (ref 1.2–2.2)
ALP SERPL-CCNC: 85 IU/L (ref 39–117)
ALT SERPL-CCNC: 16 IU/L (ref 0–32)
AST SERPL-CCNC: 21 IU/L (ref 0–40)
BILIRUB SERPL-MCNC: 1.3 MG/DL (ref 0–1.2)
BUN SERPL-MCNC: 15 MG/DL (ref 8–27)
BUN/CREAT SERPL: 25 (ref 12–28)
CALCIUM SERPL-MCNC: 9.8 MG/DL (ref 8.7–10.3)
CHLORIDE SERPL-SCNC: 97 MMOL/L (ref 96–106)
CHOLEST SERPL-MCNC: 147 MG/DL (ref 100–199)
CO2 SERPL-SCNC: 26 MMOL/L (ref 18–29)
CREAT SERPL-MCNC: 0.61 MG/DL (ref 0.57–1)
ERYTHROCYTE [DISTWIDTH] IN BLOOD BY AUTOMATED COUNT: 17.6 % (ref 12.3–15.4)
EST. AVERAGE GLUCOSE BLD GHB EST-MCNC: 123 MG/DL
GLOBULIN SER CALC-MCNC: 2.3 G/DL (ref 1.5–4.5)
GLUCOSE SERPL-MCNC: 86 MG/DL (ref 65–99)
HBA1C MFR BLD: 5.9 % (ref 4.8–5.6)
HCT VFR BLD AUTO: 42.4 % (ref 34–46.6)
HDLC SERPL-MCNC: 68 MG/DL
HGB BLD-MCNC: 13.4 G/DL (ref 11.1–15.9)
INTERPRETATION, 910389: NORMAL
LDLC SERPL CALC-MCNC: 66 MG/DL (ref 0–99)
MCH RBC QN AUTO: 28.3 PG (ref 26.6–33)
MCHC RBC AUTO-ENTMCNC: 31.6 G/DL (ref 31.5–35.7)
MCV RBC AUTO: 90 FL (ref 79–97)
PLATELET # BLD AUTO: 363 X10E3/UL (ref 150–379)
POTASSIUM SERPL-SCNC: 4.7 MMOL/L (ref 3.5–5.2)
PROT SERPL-MCNC: 6.7 G/DL (ref 6–8.5)
RBC # BLD AUTO: 4.73 X10E6/UL (ref 3.77–5.28)
SODIUM SERPL-SCNC: 141 MMOL/L (ref 134–144)
TRIGL SERPL-MCNC: 65 MG/DL (ref 0–149)
VLDLC SERPL CALC-MCNC: 13 MG/DL (ref 5–40)
WBC # BLD AUTO: 8 X10E3/UL (ref 3.4–10.8)

## 2018-02-09 ENCOUNTER — OFFICE VISIT (OUTPATIENT)
Dept: INTERNAL MEDICINE CLINIC | Age: 78
End: 2018-02-09

## 2018-02-09 VITALS
RESPIRATION RATE: 16 BRPM | OXYGEN SATURATION: 93 % | BODY MASS INDEX: 20.89 KG/M2 | HEART RATE: 84 BPM | SYSTOLIC BLOOD PRESSURE: 149 MMHG | DIASTOLIC BLOOD PRESSURE: 78 MMHG | HEIGHT: 66 IN | TEMPERATURE: 98.4 F | WEIGHT: 130 LBS

## 2018-02-09 DIAGNOSIS — J44.9 CHRONIC OBSTRUCTIVE PULMONARY DISEASE, UNSPECIFIED COPD TYPE (HCC): ICD-10-CM

## 2018-02-09 DIAGNOSIS — J18.9 COMMUNITY ACQUIRED PNEUMONIA, UNSPECIFIED LATERALITY: Primary | ICD-10-CM

## 2018-02-09 RX ORDER — LEVOFLOXACIN 750 MG/1
750 TABLET ORAL DAILY
Qty: 7 TAB | Refills: 0 | Status: SHIPPED | OUTPATIENT
Start: 2018-02-09 | End: 2018-02-14 | Stop reason: ALTCHOICE

## 2018-02-09 NOTE — MR AVS SNAPSHOT
[address] [facility] 
 
 
 [address] [place] 100[address] [facility] 
[phone] Patient: Verónica Candelaria MRN: [id number] ZPS:[id number] Visit Information Date & Time Provider Department Dept. Phone Encounter #  
 2/9/2018 10:45 AM Andi Harden MD [facility] [address] [id number] Upcoming Health Maintenance Date Due  
 GLAUCOMA SCREENING Q2Y 4/21/2017 MEDICARE YEARLY EXAM 1/16/2019 DTaP/Tdap/Td series (2 - Td) 6/17/2026 COLONOSCOPY 8/31/2026 Allergies as of 2/9/2018  Review Complete On: 2/7/4595 By: Xiang Tinoco Severity Noted Reaction Type Reactions Augmentin [Amoxicillin-pot Clavulanate]  08/25/2016    Nausea Only Current Immunizations  Never Reviewed Name Date Influenza High Dose Vaccine PF 9/22/2016 Influenza Vaccine 10/1/2014 Pneumococcal Conjugate (PCV-13) 11/9/2015 Pneumococcal Vaccine (Unspecified Type) 1/1/2009 Not reviewed this visit You Were Diagnosed With   
  
 Codes Comments Community acquired pneumonia, unspecified laterality    -  Primary ICD-10-CM: J18.9 ICD-9-CM: 538 Chronic obstructive pulmonary disease, unspecified COPD type ([facility]     ICD-10-CM: J44.9 ICD-9-CM: 079 Vitals BP Pulse Temp Resp Height(growth percentile) Weight(growth percentile) 149/78 (BP 1 Location: Left arm, BP Patient Position: Sitting) 84 98.4 °F (36.9 °C) (Oral) 16 5' 6\" (1.676 m) 130 lb (59 kg) SpO2 BMI OB Status Smoking Status 93% 20.98 kg/m2 Postmenopausal Former Smoker Vitals History BMI and BSA Data Body Mass Index Body Surface Area  
 20.98 kg/m 2 1.66 m 2 Preferred Pharmacy Pharmacy Name Phone [address], [facility] [facility] [address] [phone] Your Updated Medication List  
  
   
This list is accurate as of: 2/9/18 11:22 AM.  Always use your most recent med list.  
  
  
  
  
 albuterol 90 mcg/actuation inhaler Commonly known as:  PROVENTIL HFA, VENTOLIN HFA, PROAIR HFA Take 2 Puffs by inhalation every four (4) hours as needed for Wheezing. aspirin 81 mg chewable tablet Take 81 mg by mouth every other day. atorvastatin 20 mg tablet Commonly known as:  LIPITOR Take 40 mg by mouth daily. budesonide-formoterol 160-4.5 mcg/actuation Hfaa Commonly known as:  SYMBICORT Take 2 Puffs by inhalation two (2) times a day. CALCIUM + D PO Take  by mouth. doxycycline 100 mg capsule Commonly known as:  Qasim  Take 1 Cap by mouth two (2) times a day. KRILL OIL PO Take  by mouth daily. levoFLOXacin 750 mg tablet Commonly known as:  Montrell Clayton Take 1 Tab by mouth daily for 7 days. * lisinopril-hydroCHLOROthiazide 10-12.5 mg per tablet Commonly known as:  Melvena Young Take 1 Tab by mouth daily for 90 days. * lisinopril-hydroCHLOROthiazide 10-12.5 mg per tablet Commonly known as:  Melvena Young Take 1 Tab by mouth daily for 90 days. * lisinopril-hydroCHLOROthiazide 10-12.5 mg per tablet Commonly known as:  PRINZIDE, ZESTORETIC  
TAKE ONE TABLET BY MOUTH ONCE DAILY  
  
 magnesium 250 mg Tab Take 500 mg by mouth daily. predniSONE 20 mg tablet Commonly known as:  DELTASONE  
3 qd for 4 days, 2 qd for 5 days, 1 qd for 5 days, 1/2 qd for 3 days TUDORZA PRESSAIR 400 mcg/actuation inhaler Generic drug:  aclidinium bromide Take 1 Puff by inhalation. VITAMIN D3 1,000 unit tablet Generic drug:  cholecalciferol Take  by mouth daily. * Notice: This list has 3 medication(s) that are the same as other medications prescribed for you. Read the directions carefully, and ask your doctor or other care provider to review them with you. Prescriptions Sent to Pharmacy Refills  
 levoFLOXacin (LEVAQUIN) 750 mg tablet 0 Sig: Take 1 Tab by mouth daily for 7 days. Class: Normal  
 Pharmacy: [address] [facility] [address], [address] [facility] [address]  #: [phone] Route: Oral  
  
Patient Instructions Pneumonia: Care Instructions Your Care Instructions Pneumonia is an infection of the lungs. Most cases are caused by infections from bacteria or viruses. Pneumonia may be mild or very severe. If it is caused by bacteria, you will be treated with antibiotics. It may take a few weeks to a few months to recover fully from pneumonia, depending on how sick you were and whether your overall health is good. Follow-up care is a key part of your treatment and safety. Be sure to make and go to all appointments, and call your doctor if you are having problems. It's also a good idea to know your test results and keep a list of the medicines you take. How can you care for yourself at home? · Take your antibiotics exactly as directed. Do not stop taking the medicine just because you are feeling better. You need to take the full course of antibiotics. · Take your medicines exactly as prescribed. Call your doctor if you think you are having a problem with your medicine. · Get plenty of rest and sleep. You may feel weak and tired for a while, but your energy level will improve with time. · To prevent dehydration, drink plenty of fluids, enough so that your urine is light yellow or clear like water. Choose water and other caffeine-free clear liquids until you feel better. If you have kidney, heart, or liver disease and have to limit fluids, talk with your doctor before you increase the amount of fluids you drink. · Take care of your cough so you can rest. A cough that brings up mucus from your lungs is common with pneumonia. It is one way your body gets rid of the infection. But if coughing keeps you from resting or causes severe fatigue and chest-wall pain, talk to your doctor. He or she may suggest that you take a medicine to reduce the cough. · Use a vaporizer or humidifier to add moisture to your bedroom. Follow the directions for cleaning the machine. · Do not smoke or allow others to smoke around you. Smoke will make your cough last longer. If you need help quitting, talk to your doctor about stop-smoking programs and medicines. These can increase your chances of quitting for good. · Take an over-the-counter pain medicine, such as acetaminophen (Tylenol), ibuprofen (Advil, Motrin), or naproxen (Aleve). Read and follow all instructions on the label. · Do not take two or more pain medicines at the same time unless the doctor told you to. Many pain medicines have acetaminophen, which is Tylenol. Too much acetaminophen (Tylenol) can be harmful. · If you were given a spirometer to measure how well your lungs are working, use it as instructed. This can help your doctor tell how your recovery is going. · To prevent pneumonia in the future, talk to your doctor about getting a flu vaccine (once a year) and a pneumococcal vaccine (one time only for most people). When should you call for help? Call 911 anytime you think you may need emergency care. For example, call if: 
? · You have severe trouble breathing. ?Call your doctor now or seek immediate medical care if: 
? · You cough up dark brown or bloody mucus (sputum). ? · You have new or worse trouble breathing. ? · You are dizzy or lightheaded, or you feel like you may faint. ? Watch closely for changes in your health, and be sure to contact your doctor if: 
? · You have a new or higher fever. ? · You are coughing more deeply or more often. ? · You are not getting better after 2 days (48 hours). ? · You do not get better as expected. Where can you learn more? Go to http://maria r-mason.info/. Enter [id number] in the search box to learn more about \"Pneumonia: Care Instructions. \" Current as of: May 12, 2017 Content Version: 11.4 © 2903-0387 Healthwise, Incorporated. Care instructions adapted under license by [employer] (which disclaims liability or warranty for this information). If you have questions about a medical condition or this instruction, always ask your healthcare professional. [address] any warranty or liability for your use of this information. Introducing [facility]! Dear Tahir Santos: Thank you for requesting a Rkylin account. Our records indicate that you already have an active Rkylin account. You can access your account anytime at [email or web address] Did you know that you can access your hospital and ER discharge instructions at any time in Rkylin? You can also review all of your test results from your hospital stay or ER visit. Additional Information If you have questions, please visit the Frequently Asked Questions section of the Rkylin website at [email or web address] Remember, Rkylin is NOT to be used for urgent needs. For medical emergencies, dial 911. Now available from your iPhone and Android! Please provide this summary of care documentation to your next provider. Your primary care clinician is listed as Yeison Bernard. If you have any questions after today's visit, please call [phone].

## 2018-02-09 NOTE — PROGRESS NOTES
Jenn Maguire is a 78 y.o. female who presents today for Cough; Nasal Congestion; Shortness of Breath; and COPD  . She has a history of   Patient Active Problem List   Diagnosis Code    HTN (hypertension) I10    Serum lipids high E78.5    GERD (gastroesophageal reflux disease) K21.9    Menopausal state N95.1    Asthma J45.909    ACP (advance care planning) Z71.89    Chronic obstructive pulmonary disease ([facility] J44.9   . Today patient is here for an acute visit. Upper respiratory illness:  Jenn Maguire presents with complaints of congestion, sore throat, productive cough, myalgias, fever, chills and hoarseness for 6 days. no nausea and no vomiting . Has doxy and prednisone that she starts. Started on this sunday. Symptoms are moderate. Over-the-counter remedies including also taking mucinex. Taking symbicort/AC. Drinking plenty of fluids: yes  Asthma?:  No, but COPD        Now down to 2 prednisone daily. Was on 3. ROS  Review of Systems   Constitutional: Negative for chills, fever (Better) and weight loss. HENT: Positive for congestion. Negative for ear discharge, ear pain, hearing loss, nosebleeds, sinus pain and tinnitus. Eyes: Negative for blurred vision, double vision and photophobia. Respiratory: Positive for cough and wheezing. Negative for hemoptysis, sputum production, shortness of breath and stridor. Cardiovascular: Negative for chest pain, palpitations, orthopnea, claudication and leg swelling. Gastrointestinal: Negative for abdominal pain, diarrhea, nausea and vomiting. Genitourinary: Negative for dysuria, frequency and urgency. Skin: Negative for rash. Neurological: Negative. Endo/Heme/Allergies: Does not bruise/bleed easily. Psychiatric/Behavioral: Negative for depression. The patient is not nervous/anxious.         Visit Vitals    /78 (BP 1 Location: Left arm, BP Patient Position: Sitting)    Pulse 84    Temp 98.4 °F (36.9 °C) (Oral)    Resp 16    Ht 5' 6\" (1.676 m)    Wt 130 lb (59 kg)    SpO2 93%    BMI 20.98 kg/m2       Physical Exam   Constitutional: She is oriented to person, place, and time. She appears well-nourished. HENT:   Head: Normocephalic and atraumatic. Right Ear: External ear normal.   Left Ear: External ear normal.   Mouth/Throat: Oropharynx is clear and moist.   Cardiovascular: Normal rate and regular rhythm. No murmur heard. Pulmonary/Chest: She has wheezes. She has no rales. She exhibits no tenderness. Poor air movement. Egophony to RLL. Diffuse wheezing. Wet cough   Neurological: She is alert and oriented to person, place, and time. Skin: Skin is warm and dry. Psychiatric: She has a normal mood and affect. Her behavior is normal.         Current Outpatient Prescriptions   Medication Sig    levoFLOXacin (LEVAQUIN) 750 mg tablet Take 1 Tab by mouth daily for 7 days.  doxycycline (MONODOX) 100 mg capsule Take 1 Cap by mouth two (2) times a day.  predniSONE (DELTASONE) 20 mg tablet 3 qd for 4 days, 2 qd for 5 days, 1 qd for 5 days, 1/2 qd for 3 days    lisinopril-hydroCHLOROthiazide (PRINZIDE, ZESTORETIC) 10-12.5 mg per tablet Take 1 Tab by mouth daily for 90 days.  lisinopril-hydroCHLOROthiazide (PRINZIDE, ZESTORETIC) 10-12.5 mg per tablet TAKE ONE TABLET BY MOUTH ONCE DAILY    KRILL OIL PO Take  by mouth daily.  budesonide-formoterol (SYMBICORT) 160-4.5 mcg/actuation HFA inhaler Take 2 Puffs by inhalation two (2) times a day.  magnesium 250 mg tab Take 500 mg by mouth daily.  aclidinium bromide (TUDORZA PRESSAIR) 400 mcg/actuation inhaler Take 1 Puff by inhalation.  cholecalciferol (VITAMIN D3) 1,000 unit tablet Take  by mouth daily.  albuterol (PROVENTIL HFA, VENTOLIN HFA) 90 mcg/actuation inhaler Take 2 Puffs by inhalation every four (4) hours as needed for Wheezing.  CALCIUM CARBONATE/VITAMIN D3 (CALCIUM + D PO) Take  by mouth.     ASPIRIN 81 mg chewable tablet Take 81 mg by mouth every other day.  atorvastatin (LIPITOR) 20 mg tablet Take 40 mg by mouth daily.  lisinopril-hydroCHLOROthiazide (PRINZIDE, ZESTORETIC) 10-12.5 mg per tablet Take 1 Tab by mouth daily for 90 days. No current facility-administered medications for this visit. Past Medical History:   Diagnosis Date    Asthma 10/11/2012    GERD (gastroesophageal reflux disease) 4/1/2009    HTN 4/1/2009    Menopausal state 4/1/2009    Serum lipid, elevated 4/1/2009      Past Surgical History:   Procedure Laterality Date    COLONOSCOPY N/A 8/31/2016    COLONOSCOPY performed by Nish Dalal MD at [address] [place] [address]      Social History   Substance Use Topics    Smoking status: Former Smoker     Packs/day: 1.00     Years: 20.00     Types: Cigarettes     Quit date: 1/1/1980    Smokeless tobacco: Never Used    Alcohol use 0.6 oz/week     1 Glasses of wine per week      Comment: daily glass of wine      Family History   Problem Relation Age of Onset    Heart Attack Mother     Hypertension Mother     High Cholesterol Mother     Hypertension Father     Hypertension Brother         Allergies   Allergen Reactions    Augmentin [Amoxicillin-Pot Clavulanate] Nausea Only        Assessment/Plan  Diagnoses and all orders for this visit:    1. Community acquired pneumonia, unspecified laterality - stop doxy and start levaquin. Has had a low dose CT recently which was negative, so am ok with not getting CXR even though has egophony. Continue other therapies. -     levoFLOXacin (LEVAQUIN) 750 mg tablet; Take 1 Tab by mouth daily for 7 days.     2. Chronic obstructive pulmonary disease, unspecified COPD type ([facility]        Follow-up Disposition: Not on File    Peter Black MD  2/9/2018

## 2018-02-09 NOTE — PATIENT INSTRUCTIONS

## 2018-02-14 ENCOUNTER — HOSPITAL ENCOUNTER (OUTPATIENT)
Dept: GENERAL RADIOLOGY | Age: 78
Discharge: HOME | End: 2018-02-14
Attending: INTERNAL MEDICINE
Payer: MEDICARE

## 2018-02-14 ENCOUNTER — OFFICE VISIT (OUTPATIENT)
Dept: INTERNAL MEDICINE CLINIC | Age: 78
End: 2018-02-14

## 2018-02-14 VITALS
OXYGEN SATURATION: 96 % | HEIGHT: 66 IN | SYSTOLIC BLOOD PRESSURE: 125 MMHG | RESPIRATION RATE: 14 BRPM | HEART RATE: 93 BPM | DIASTOLIC BLOOD PRESSURE: 70 MMHG | TEMPERATURE: 97.3 F

## 2018-02-14 DIAGNOSIS — I10 ESSENTIAL HYPERTENSION: ICD-10-CM

## 2018-02-14 DIAGNOSIS — J44.1 CHRONIC OBSTRUCTIVE PULMONARY DISEASE WITH ACUTE EXACERBATION (HCC): Primary | ICD-10-CM

## 2018-02-14 DIAGNOSIS — J44.1 CHRONIC OBSTRUCTIVE PULMONARY DISEASE WITH ACUTE EXACERBATION (HCC): ICD-10-CM

## 2018-02-14 PROCEDURE — 71046 X-RAY EXAM CHEST 2 VIEWS: CPT

## 2018-02-14 RX ORDER — PREDNISONE 20 MG/1
TABLET ORAL
Qty: 27 TAB | Refills: 0 | Status: SHIPPED | OUTPATIENT
Start: 2018-02-14 | End: 2018-04-03

## 2018-02-14 RX ORDER — LEVOFLOXACIN 500 MG/1
500 TABLET, FILM COATED ORAL DAILY
Qty: 10 TAB | Refills: 0 | Status: SHIPPED | OUTPATIENT
Start: 2018-02-14 | End: 2018-04-03

## 2018-02-14 NOTE — PROGRESS NOTES
HISTORY OF PRESENT ILLNESS  Karleen Moritz is a 78 y.o. female. HPI   Patient reports she is having a hard time breathing. Patient states 2 weeks ago she was on a ship and people around her were coughing by 2/6/18. She states she started taking doxycycline because she had productive cough. Patient has finished prednisone and still has cough, SOB, and chest tightness. She continues nebulizer treatments 2-3 times a day. She states she can sleep fine at night. Hypertension ROS: taking medications as instructed, no medication side effects noted, no TIA's, no chest pain on exertion, no dyspnea on exertion, no swelling of ankles. New concerns:  Patient's BP in office today is 125/70. Review of Systems   All other systems reviewed and are negative. Physical Exam   Constitutional: She is oriented to person, place, and time. She appears well-developed and well-nourished. HENT:   Head: Normocephalic and atraumatic. Right Ear: External ear normal.   Left Ear: External ear normal.   Nose: Nose normal.   Mouth/Throat: Oropharynx is clear and moist.   Eyes: Conjunctivae and EOM are normal.   Neck: Normal range of motion. Neck supple. Carotid bruit is not present. No thyroid mass and no thyromegaly present. Cardiovascular: Normal rate, regular rhythm, S1 normal, S2 normal, normal heart sounds and intact distal pulses. Pulmonary/Chest: Effort normal and breath sounds normal.   Abdominal: Soft. Normal appearance and bowel sounds are normal. There is no hepatosplenomegaly. There is no tenderness. Musculoskeletal: Normal range of motion. Neurological: She is alert and oriented to person, place, and time. She has normal strength. No cranial nerve deficit or sensory deficit. Coordination normal.   Skin: Skin is warm, dry and intact. No abrasion and no rash noted. Psychiatric: She has a normal mood and affect.  Her behavior is normal. Judgment and thought content normal.   Nursing note and vitals reviewed. ASSESSMENT and PLAN  Diagnoses and all orders for this visit:    1. Chronic obstructive pulmonary disease with acute exacerbation (HCC)  Presents with cough, SOB, and chest tightness. Will treat with Levaquin 500 mg and prednisone taper. Concerned about infection and will need to rule out with chest XR.  -     levoFLOXacin (LEVAQUIN) 500 mg tablet; Take 1 Tab by mouth daily. -     predniSONE (DELTASONE) 20 mg tablet; 3 every day for 5 days, 2 every day for 5 days, 1 every day for 5 days, 1/2 every day for 5 days  -     XR CHEST PA LAT; Future    2. Essential hypertension  BP is at goal. I do not recommend any change in medications. lab results and schedule of future lab studies reviewed with patient  reviewed diet, exercise and weight control    Written by Tosha Johnston, as dictated by Hillary Gonzalez MD.     Current diagnosis and concerns discussed with pt at length. Understands risks and benefits or current treatment plan and medications and accepts the treatment and medication with any possible risks. Pt asks appropriate questions which were answered. Pt instructed to call with any concerns or problems.

## 2018-02-15 ENCOUNTER — OFFICE VISIT (OUTPATIENT)
Dept: INTERNAL MEDICINE CLINIC | Age: 78
End: 2018-02-15

## 2018-02-15 ENCOUNTER — PATIENT MESSAGE (OUTPATIENT)
Dept: INTERNAL MEDICINE CLINIC | Age: 78
End: 2018-02-15

## 2018-02-15 VITALS
TEMPERATURE: 97.6 F | DIASTOLIC BLOOD PRESSURE: 74 MMHG | HEART RATE: 60 BPM | HEIGHT: 66 IN | SYSTOLIC BLOOD PRESSURE: 128 MMHG | RESPIRATION RATE: 14 BRPM | OXYGEN SATURATION: 95 %

## 2018-02-15 DIAGNOSIS — J44.1 CHRONIC OBSTRUCTIVE PULMONARY DISEASE WITH ACUTE EXACERBATION (HCC): Primary | ICD-10-CM

## 2018-02-15 DIAGNOSIS — I10 ESSENTIAL HYPERTENSION: ICD-10-CM

## 2018-02-15 NOTE — PROGRESS NOTES
HISTORY OF PRESENT ILLNESS  Coreen Moe is a 78 y.o. female. HPI   Patient reports she feels much better. She admits to some minor difficulty breathing. Patient reports neuropathy started about 8 months ago. She inquires if Levaquin contributed to neuropathy. Hypertension ROS: taking medications as instructed, no medication side effects noted, no TIA's, no chest pain on exertion, no dyspnea on exertion, no swelling of ankles. New concerns:  Patient's BP in office today is 128/74. Review of Systems   All other systems reviewed and are negative. Physical Exam   Constitutional: She is oriented to person, place, and time. She appears well-developed and well-nourished. HENT:   Head: Normocephalic and atraumatic. Right Ear: External ear normal.   Left Ear: External ear normal.   Nose: Nose normal.   Mouth/Throat: Oropharynx is clear and moist.   Eyes: Conjunctivae and EOM are normal.   Neck: Normal range of motion. Neck supple. Carotid bruit is not present. No thyroid mass and no thyromegaly present. Cardiovascular: Normal rate, regular rhythm, S1 normal, S2 normal, normal heart sounds and intact distal pulses. Pulmonary/Chest: Effort normal and breath sounds normal.   Abdominal: Soft. Normal appearance and bowel sounds are normal. There is no hepatosplenomegaly. There is no tenderness. Musculoskeletal: Normal range of motion. Neurological: She is alert and oriented to person, place, and time. She has normal strength. No cranial nerve deficit or sensory deficit. Coordination normal.   Skin: Skin is warm, dry and intact. No abrasion and no rash noted. Psychiatric: She has a normal mood and affect. Her behavior is normal. Judgment and thought content normal.   Nursing note and vitals reviewed. ASSESSMENT and PLAN  Diagnoses and all orders for this visit:    1. Chronic obstructive pulmonary disease with acute exacerbation ([address]  Lung exam much improved since last visit.  Continue current medications. 2. Essential hypertension   BP is at goal. I do not recommend any change in medications. lab results and schedule of future lab studies reviewed with patient  reviewed diet, exercise and weight control    Written by Reese Sweeney, as dictated by Aislinn Knox MD.     Current diagnosis and concerns discussed with pt at length. Understands risks and benefits or current treatment plan and medications and accepts the treatment and medication with any possible risks. Pt asks appropriate questions which were answered. Pt instructed to call with any concerns or problems.

## 2018-02-15 NOTE — TELEPHONE ENCOUNTER
From: Leonard Paris  To: Eloina Valenzuela MD  Sent: 2/15/2018 10:44 AM EST  Subject: Non-Urgent Medical Question    As I was leaving your office I got a text. My granddaughters scans were clear. I am beyond happy and very thankful. Leon fir listening to me and being a great doctor.    Ronan

## 2018-03-10 RX ORDER — OMEPRAZOLE 20 MG/1
20 CAPSULE, DELAYED RELEASE ORAL DAILY
Qty: 90 CAP | Refills: 1 | Status: SHIPPED | OUTPATIENT
Start: 2018-03-10 | End: 2019-03-17 | Stop reason: SDUPTHER

## 2018-04-03 ENCOUNTER — APPOINTMENT (OUTPATIENT)
Dept: GENERAL RADIOLOGY | Age: 78
DRG: 190 | End: 2018-04-03
Attending: EMERGENCY MEDICINE
Payer: MEDICARE

## 2018-04-03 ENCOUNTER — APPOINTMENT (OUTPATIENT)
Dept: CT IMAGING | Age: 78
DRG: 190 | End: 2018-04-03
Attending: INTERNAL MEDICINE
Payer: MEDICARE

## 2018-04-03 ENCOUNTER — HOSPITAL ENCOUNTER (INPATIENT)
Age: 78
LOS: 6 days | Discharge: HOME | DRG: 190 | End: 2018-04-09
Attending: EMERGENCY MEDICINE | Admitting: INTERNAL MEDICINE
Payer: MEDICARE

## 2018-04-03 DIAGNOSIS — J96.01 ACUTE RESPIRATORY FAILURE WITH HYPOXIA AND HYPERCAPNIA (HCC): Primary | ICD-10-CM

## 2018-04-03 DIAGNOSIS — J96.02 ACUTE RESPIRATORY FAILURE WITH HYPOXIA AND HYPERCAPNIA (HCC): Primary | ICD-10-CM

## 2018-04-03 DIAGNOSIS — J44.1 CHRONIC OBSTRUCTIVE PULMONARY DISEASE WITH ACUTE EXACERBATION (HCC): ICD-10-CM

## 2018-04-03 PROBLEM — R00.0 SINUS TACHYCARDIA: Status: ACTIVE | Noted: 2018-04-03

## 2018-04-03 PROBLEM — Z95.1 S/P CABG (CORONARY ARTERY BYPASS GRAFT): Status: ACTIVE | Noted: 2018-04-03

## 2018-04-03 PROBLEM — I25.10 CAD (CORONARY ARTERY DISEASE): Status: ACTIVE | Noted: 2018-04-03

## 2018-04-03 LAB
ALBUMIN SERPL-MCNC: 3.4 G/DL (ref 3.5–5)
ALBUMIN/GLOB SERPL: 0.8 {RATIO} (ref 1.1–2.2)
ALP SERPL-CCNC: 106 U/L (ref 45–117)
ALT SERPL-CCNC: 53 U/L (ref 12–78)
ANION GAP SERPL CALC-SCNC: 15 MMOL/L (ref 5–15)
APPEARANCE UR: ABNORMAL
ARTERIAL PATENCY WRIST A: YES
ARTERIAL PATENCY WRIST A: YES
AST SERPL-CCNC: 90 U/L (ref 15–37)
ATRIAL RATE: 129 BPM
B PERT DNA SPEC QL NAA+PROBE: NOT DETECTED
BACTERIA URNS QL MICRO: NEGATIVE /HPF
BASE DEFICIT BLDA-SCNC: 8.4 MMOL/L
BASE DEFICIT BLDA-SCNC: 9.6 MMOL/L
BASOPHILS # BLD: 0.1 K/UL (ref 0–0.1)
BASOPHILS NFR BLD: 0 % (ref 0–1)
BDY SITE: ABNORMAL
BDY SITE: ABNORMAL
BILIRUB SERPL-MCNC: 0.9 MG/DL (ref 0.2–1)
BILIRUB UR QL: NEGATIVE
BNP SERPL-MCNC: 9738 PG/ML (ref 0–450)
BREATHS.SPONTANEOUS ON VENT: 23
BUN SERPL-MCNC: 17 MG/DL (ref 6–20)
BUN/CREAT SERPL: 19 (ref 12–20)
C PNEUM DNA SPEC QL NAA+PROBE: NOT DETECTED
CALCIUM SERPL-MCNC: 9.3 MG/DL (ref 8.5–10.1)
CALCULATED P AXIS, ECG09: 89 DEGREES
CALCULATED R AXIS, ECG10: 94 DEGREES
CALCULATED T AXIS, ECG11: -55 DEGREES
CHLORIDE SERPL-SCNC: 92 MMOL/L (ref 97–108)
CO2 SERPL-SCNC: 23 MMOL/L (ref 21–32)
COLOR UR: ABNORMAL
CREAT SERPL-MCNC: 0.89 MG/DL (ref 0.55–1.02)
D DIMER PPP FEU-MCNC: 4.02 MG/L FEU (ref 0–0.65)
DIAGNOSIS, 93000: NORMAL
DIFFERENTIAL METHOD BLD: ABNORMAL
EOSINOPHIL # BLD: 0 K/UL (ref 0–0.4)
EOSINOPHIL NFR BLD: 0 % (ref 0–7)
EPAP/CPAP/PEEP, PAPEEP: 8
EPAP/CPAP/PEEP, PAPEEP: 8
EPITH CASTS URNS QL MICRO: ABNORMAL /LPF
ERYTHROCYTE [DISTWIDTH] IN BLOOD BY AUTOMATED COUNT: 15.6 % (ref 11.5–14.5)
FIO2 ON VENT: 40 %
FIO2 ON VENT: 40 %
FLUAV H1 2009 PAND RNA SPEC QL NAA+PROBE: NOT DETECTED
FLUAV H1 RNA SPEC QL NAA+PROBE: NOT DETECTED
FLUAV H3 RNA SPEC QL NAA+PROBE: NOT DETECTED
FLUAV SUBTYP SPEC NAA+PROBE: NOT DETECTED
FLUBV RNA SPEC QL NAA+PROBE: NOT DETECTED
GAS FLOW.O2 SETTING OXYMISER: 8 L/MIN
GLOBULIN SER CALC-MCNC: 4.2 G/DL (ref 2–4)
GLUCOSE SERPL-MCNC: 167 MG/DL (ref 65–100)
GLUCOSE UR STRIP.AUTO-MCNC: NEGATIVE MG/DL
GRAN CASTS URNS QL MICRO: ABNORMAL /LPF
HADV DNA SPEC QL NAA+PROBE: NOT DETECTED
HCO3 BLDA-SCNC: 16 MMOL/L (ref 22–26)
HCO3 BLDA-SCNC: 21 MMOL/L (ref 22–26)
HCOV 229E RNA SPEC QL NAA+PROBE: NOT DETECTED
HCOV HKU1 RNA SPEC QL NAA+PROBE: NOT DETECTED
HCOV NL63 RNA SPEC QL NAA+PROBE: NOT DETECTED
HCOV OC43 RNA SPEC QL NAA+PROBE: NOT DETECTED
HCT VFR BLD AUTO: 52.2 % (ref 35–47)
HGB BLD-MCNC: 16.8 G/DL (ref 11.5–16)
HGB UR QL STRIP: NEGATIVE
HMPV RNA SPEC QL NAA+PROBE: DETECTED
HPIV1 RNA SPEC QL NAA+PROBE: NOT DETECTED
HPIV2 RNA SPEC QL NAA+PROBE: NOT DETECTED
HPIV3 RNA SPEC QL NAA+PROBE: NOT DETECTED
HPIV4 RNA SPEC QL NAA+PROBE: NOT DETECTED
HYALINE CASTS URNS QL MICRO: ABNORMAL /LPF (ref 0–5)
IMM GRANULOCYTES # BLD: 0.1 K/UL (ref 0–0.04)
IMM GRANULOCYTES NFR BLD AUTO: 1 % (ref 0–0.5)
IPAP/PIP, IPAPIP: 16
IPAP/PIP, IPAPIP: 16
KETONES UR QL STRIP.AUTO: 40 MG/DL
LACTATE SERPL-SCNC: 2 MMOL/L (ref 0.4–2)
LACTATE SERPL-SCNC: 3 MMOL/L (ref 0.4–2)
LACTATE SERPL-SCNC: 3.8 MMOL/L (ref 0.4–2)
LEUKOCYTE ESTERASE UR QL STRIP.AUTO: NEGATIVE
LYMPHOCYTES # BLD: 1.7 K/UL (ref 0.8–3.5)
LYMPHOCYTES NFR BLD: 14 % (ref 12–49)
M PNEUMO DNA SPEC QL NAA+PROBE: NOT DETECTED
MCH RBC QN AUTO: 28.9 PG (ref 26–34)
MCHC RBC AUTO-ENTMCNC: 32.2 G/DL (ref 30–36.5)
MCV RBC AUTO: 89.8 FL (ref 80–99)
MONOCYTES # BLD: 0.5 K/UL (ref 0–1)
MONOCYTES NFR BLD: 4 % (ref 5–13)
NEUTS SEG # BLD: 9.5 K/UL (ref 1.8–8)
NEUTS SEG NFR BLD: 81 % (ref 32–75)
NITRITE UR QL STRIP.AUTO: NEGATIVE
NRBC # BLD: 0 K/UL (ref 0–0.01)
NRBC BLD-RTO: 0 PER 100 WBC
P-R INTERVAL, ECG05: 140 MS
PCO2 BLDA: 34 MMHG (ref 35–45)
PCO2 BLDA: 57 MMHG (ref 35–45)
PH BLDA: 7.18 [PH] (ref 7.35–7.45)
PH BLDA: 7.29 [PH] (ref 7.35–7.45)
PH UR STRIP: 6 [PH] (ref 5–8)
PLATELET # BLD AUTO: 338 K/UL (ref 150–400)
PMV BLD AUTO: 10.4 FL (ref 8.9–12.9)
PO2 BLDA: 149 MMHG (ref 80–100)
PO2 BLDA: 95 MMHG (ref 80–100)
POTASSIUM SERPL-SCNC: 4.5 MMOL/L (ref 3.5–5.1)
PROT SERPL-MCNC: 7.6 G/DL (ref 6.4–8.2)
PROT UR STRIP-MCNC: 100 MG/DL
Q-T INTERVAL, ECG07: 302 MS
QRS DURATION, ECG06: 112 MS
QTC CALCULATION (BEZET), ECG08: 440 MS
RBC # BLD AUTO: 5.81 M/UL (ref 3.8–5.2)
RBC #/AREA URNS HPF: ABNORMAL /HPF (ref 0–5)
RSV RNA SPEC QL NAA+PROBE: NOT DETECTED
RV+EV RNA SPEC QL NAA+PROBE: NOT DETECTED
SAO2 % BLD: 95 % (ref 92–97)
SAO2 % BLD: 99 % (ref 92–97)
SAO2% DEVICE SAO2% SENSOR NAME: ABNORMAL
SAO2% DEVICE SAO2% SENSOR NAME: ABNORMAL
SERVICE CMNT-IMP: ABNORMAL
SERVICE CMNT-IMP: ABNORMAL
SODIUM SERPL-SCNC: 130 MMOL/L (ref 136–145)
SP GR UR REFRACTOMETRY: 1.02 (ref 1–1.03)
SPECIMEN SITE: ABNORMAL
SPECIMEN SITE: ABNORMAL
TROPONIN I SERPL-MCNC: 3 NG/ML
TROPONIN I SERPL-MCNC: 3.08 NG/ML
TROPONIN I SERPL-MCNC: 3.73 NG/ML
UROBILINOGEN UR QL STRIP.AUTO: 0.2 EU/DL (ref 0.2–1)
VENTILATION MODE VENT: ABNORMAL
VENTRICULAR RATE, ECG03: 128 BPM
WBC # BLD AUTO: 11.7 K/UL (ref 3.6–11)
WBC URNS QL MICRO: ABNORMAL /HPF (ref 0–4)

## 2018-04-03 PROCEDURE — 87177 OVA AND PARASITES SMEARS: CPT | Performed by: INTERNAL MEDICINE

## 2018-04-03 PROCEDURE — 65610000006 HC RM INTENSIVE CARE

## 2018-04-03 PROCEDURE — 71260 CT THORAX DX C+: CPT

## 2018-04-03 PROCEDURE — 02HV33Z INSERTION OF INFUSION DEVICE INTO SUPERIOR VENA CAVA, PERCUTANEOUS APPROACH: ICD-10-PCS | Performed by: EMERGENCY MEDICINE

## 2018-04-03 PROCEDURE — 83605 ASSAY OF LACTIC ACID: CPT | Performed by: INTERNAL MEDICINE

## 2018-04-03 PROCEDURE — 74011000250 HC RX REV CODE- 250: Performed by: INTERNAL MEDICINE

## 2018-04-03 PROCEDURE — 96365 THER/PROPH/DIAG IV INF INIT: CPT

## 2018-04-03 PROCEDURE — 74011250636 HC RX REV CODE- 250/636: Performed by: INTERNAL MEDICINE

## 2018-04-03 PROCEDURE — 36600 WITHDRAWAL OF ARTERIAL BLOOD: CPT | Performed by: EMERGENCY MEDICINE

## 2018-04-03 PROCEDURE — 71045 X-RAY EXAM CHEST 1 VIEW: CPT

## 2018-04-03 PROCEDURE — 93005 ELECTROCARDIOGRAM TRACING: CPT

## 2018-04-03 PROCEDURE — 74011000250 HC RX REV CODE- 250: Performed by: EMERGENCY MEDICINE

## 2018-04-03 PROCEDURE — 93306 TTE W/DOPPLER COMPLETE: CPT

## 2018-04-03 PROCEDURE — 81001 URINALYSIS AUTO W/SCOPE: CPT | Performed by: INTERNAL MEDICINE

## 2018-04-03 PROCEDURE — 77030013140 HC MSK NEB VYRM -A

## 2018-04-03 PROCEDURE — 77030013797 HC KT TRNSDUC PRSSR EDWD -A

## 2018-04-03 PROCEDURE — 74011636320 HC RX REV CODE- 636/320: Performed by: RADIOLOGY

## 2018-04-03 PROCEDURE — 83605 ASSAY OF LACTIC ACID: CPT | Performed by: EMERGENCY MEDICINE

## 2018-04-03 PROCEDURE — 85025 COMPLETE CBC W/AUTO DIFF WBC: CPT | Performed by: EMERGENCY MEDICINE

## 2018-04-03 PROCEDURE — 77030011256 HC DRSG MEPILEX <16IN NO BORD MOLN -A

## 2018-04-03 PROCEDURE — 87493 C DIFF AMPLIFIED PROBE: CPT | Performed by: INTERNAL MEDICINE

## 2018-04-03 PROCEDURE — 77030027138 HC INCENT SPIROMETER -A

## 2018-04-03 PROCEDURE — 77010033678 HC OXYGEN DAILY

## 2018-04-03 PROCEDURE — 87633 RESP VIRUS 12-25 TARGETS: CPT | Performed by: INTERNAL MEDICINE

## 2018-04-03 PROCEDURE — 84484 ASSAY OF TROPONIN QUANT: CPT | Performed by: EMERGENCY MEDICINE

## 2018-04-03 PROCEDURE — 77030034848

## 2018-04-03 PROCEDURE — 36415 COLL VENOUS BLD VENIPUNCTURE: CPT | Performed by: INTERNAL MEDICINE

## 2018-04-03 PROCEDURE — 74011000258 HC RX REV CODE- 258: Performed by: INTERNAL MEDICINE

## 2018-04-03 PROCEDURE — 80053 COMPREHEN METABOLIC PANEL: CPT | Performed by: EMERGENCY MEDICINE

## 2018-04-03 PROCEDURE — 99285 EMERGENCY DEPT VISIT HI MDM: CPT

## 2018-04-03 PROCEDURE — 94640 AIRWAY INHALATION TREATMENT: CPT

## 2018-04-03 PROCEDURE — B548ZZA ULTRASONOGRAPHY OF SUPERIOR VENA CAVA, GUIDANCE: ICD-10-PCS | Performed by: EMERGENCY MEDICINE

## 2018-04-03 PROCEDURE — 74011250636 HC RX REV CODE- 250/636: Performed by: EMERGENCY MEDICINE

## 2018-04-03 PROCEDURE — 87040 BLOOD CULTURE FOR BACTERIA: CPT | Performed by: INTERNAL MEDICINE

## 2018-04-03 PROCEDURE — 94660 CPAP INITIATION&MGMT: CPT

## 2018-04-03 PROCEDURE — 85379 FIBRIN DEGRADATION QUANT: CPT | Performed by: SPECIALIST

## 2018-04-03 PROCEDURE — 96375 TX/PRO/DX INJ NEW DRUG ADDON: CPT

## 2018-04-03 PROCEDURE — 74011250637 HC RX REV CODE- 250/637: Performed by: INTERNAL MEDICINE

## 2018-04-03 PROCEDURE — 89055 LEUKOCYTE ASSESSMENT FECAL: CPT | Performed by: INTERNAL MEDICINE

## 2018-04-03 PROCEDURE — 82803 BLOOD GASES ANY COMBINATION: CPT | Performed by: EMERGENCY MEDICINE

## 2018-04-03 PROCEDURE — 83880 ASSAY OF NATRIURETIC PEPTIDE: CPT | Performed by: EMERGENCY MEDICINE

## 2018-04-03 PROCEDURE — 36600 WITHDRAWAL OF ARTERIAL BLOOD: CPT | Performed by: INTERNAL MEDICINE

## 2018-04-03 RX ORDER — SODIUM CHLORIDE 0.9 % (FLUSH) 0.9 %
5-10 SYRINGE (ML) INJECTION EVERY 8 HOURS
Status: DISCONTINUED | OUTPATIENT
Start: 2018-04-03 | End: 2018-04-04 | Stop reason: SDUPTHER

## 2018-04-03 RX ORDER — SODIUM CHLORIDE 0.9 % (FLUSH) 0.9 %
5-10 SYRINGE (ML) INJECTION AS NEEDED
Status: DISCONTINUED | OUTPATIENT
Start: 2018-04-03 | End: 2018-04-04 | Stop reason: SDUPTHER

## 2018-04-03 RX ORDER — LEVOFLOXACIN 5 MG/ML
750 INJECTION, SOLUTION INTRAVENOUS EVERY 24 HOURS
Status: COMPLETED | OUTPATIENT
Start: 2018-04-03 | End: 2018-04-07

## 2018-04-03 RX ORDER — SODIUM CHLORIDE 0.9 % (FLUSH) 0.9 %
5-10 SYRINGE (ML) INJECTION AS NEEDED
Status: DISCONTINUED | OUTPATIENT
Start: 2018-04-03 | End: 2018-04-09 | Stop reason: HOSPADM

## 2018-04-03 RX ORDER — PREDNISONE 20 MG/1
20 TABLET ORAL
Status: ON HOLD | COMMUNITY
End: 2018-04-07 | Stop reason: ALTCHOICE

## 2018-04-03 RX ORDER — ARFORMOTEROL TARTRATE 15 UG/2ML
15 SOLUTION RESPIRATORY (INHALATION)
Status: DISCONTINUED | OUTPATIENT
Start: 2018-04-03 | End: 2018-04-09 | Stop reason: HOSPADM

## 2018-04-03 RX ORDER — MAGNESIUM SULFATE HEPTAHYDRATE 40 MG/ML
2 INJECTION, SOLUTION INTRAVENOUS
Status: COMPLETED | OUTPATIENT
Start: 2018-04-03 | End: 2018-04-03

## 2018-04-03 RX ORDER — ROSUVASTATIN CALCIUM 10 MG/1
10 TABLET, COATED ORAL
Status: DISCONTINUED | OUTPATIENT
Start: 2018-04-03 | End: 2018-04-05

## 2018-04-03 RX ORDER — PANTOPRAZOLE SODIUM 40 MG/1
40 TABLET, DELAYED RELEASE ORAL DAILY
Status: DISCONTINUED | OUTPATIENT
Start: 2018-04-03 | End: 2018-04-09 | Stop reason: HOSPADM

## 2018-04-03 RX ORDER — ENOXAPARIN SODIUM 100 MG/ML
1 INJECTION SUBCUTANEOUS EVERY 12 HOURS
Status: DISCONTINUED | OUTPATIENT
Start: 2018-04-03 | End: 2018-04-05

## 2018-04-03 RX ORDER — ENOXAPARIN SODIUM 100 MG/ML
40 INJECTION SUBCUTANEOUS EVERY 24 HOURS
Status: DISCONTINUED | OUTPATIENT
Start: 2018-04-03 | End: 2018-04-03

## 2018-04-03 RX ORDER — BUDESONIDE 0.5 MG/2ML
500 INHALANT ORAL
Status: DISCONTINUED | OUTPATIENT
Start: 2018-04-03 | End: 2018-04-09 | Stop reason: HOSPADM

## 2018-04-03 RX ORDER — SODIUM CHLORIDE 0.9 % (FLUSH) 0.9 %
5-10 SYRINGE (ML) INJECTION EVERY 8 HOURS
Status: DISCONTINUED | OUTPATIENT
Start: 2018-04-03 | End: 2018-04-09 | Stop reason: HOSPADM

## 2018-04-03 RX ORDER — ALBUTEROL SULFATE 0.83 MG/ML
15 SOLUTION RESPIRATORY (INHALATION) CONTINUOUS
Status: DISCONTINUED | OUTPATIENT
Start: 2018-04-03 | End: 2018-04-04

## 2018-04-03 RX ORDER — IPRATROPIUM BROMIDE AND ALBUTEROL SULFATE 2.5; .5 MG/3ML; MG/3ML
3 SOLUTION RESPIRATORY (INHALATION)
Status: COMPLETED | OUTPATIENT
Start: 2018-04-03 | End: 2018-04-03

## 2018-04-03 RX ORDER — DOCUSATE SODIUM 100 MG/1
100 CAPSULE, LIQUID FILLED ORAL 2 TIMES DAILY
Status: DISCONTINUED | OUTPATIENT
Start: 2018-04-03 | End: 2018-04-09 | Stop reason: HOSPADM

## 2018-04-03 RX ORDER — ROSUVASTATIN CALCIUM 10 MG/1
20 TABLET, COATED ORAL
COMMUNITY
End: 2018-04-03

## 2018-04-03 RX ORDER — OMEPRAZOLE 20 MG/1
20 CAPSULE, DELAYED RELEASE ORAL DAILY
Status: DISCONTINUED | OUTPATIENT
Start: 2018-04-03 | End: 2018-04-03 | Stop reason: CLARIF

## 2018-04-03 RX ORDER — ROSUVASTATIN CALCIUM 20 MG/1
20 TABLET, COATED ORAL
COMMUNITY

## 2018-04-03 RX ORDER — GUAIFENESIN 100 MG/5ML
81 LIQUID (ML) ORAL EVERY OTHER DAY
Status: DISCONTINUED | OUTPATIENT
Start: 2018-04-03 | End: 2018-04-09 | Stop reason: HOSPADM

## 2018-04-03 RX ORDER — DOXYCYCLINE 100 MG/1
100 CAPSULE ORAL 2 TIMES DAILY
Status: ON HOLD | COMMUNITY
End: 2018-04-07 | Stop reason: ALTCHOICE

## 2018-04-03 RX ADMIN — PANTOPRAZOLE SODIUM 40 MG: 40 TABLET, DELAYED RELEASE ORAL at 11:26

## 2018-04-03 RX ADMIN — Medication 10 ML: at 10:34

## 2018-04-03 RX ADMIN — IPRATROPIUM BROMIDE AND ALBUTEROL SULFATE 3 ML: .5; 3 SOLUTION RESPIRATORY (INHALATION) at 08:59

## 2018-04-03 RX ADMIN — METHYLPREDNISOLONE SODIUM SUCCINATE 60 MG: 40 INJECTION, POWDER, FOR SOLUTION INTRAMUSCULAR; INTRAVENOUS at 13:34

## 2018-04-03 RX ADMIN — SODIUM CHLORIDE 1000 ML: 900 INJECTION, SOLUTION INTRAVENOUS at 13:19

## 2018-04-03 RX ADMIN — MAGNESIUM SULFATE HEPTAHYDRATE 2 G: 40 INJECTION, SOLUTION INTRAVENOUS at 08:59

## 2018-04-03 RX ADMIN — Medication 10 ML: at 21:05

## 2018-04-03 RX ADMIN — Medication 10 ML: at 16:50

## 2018-04-03 RX ADMIN — PHENYLEPHRINE HYDROCHLORIDE 30 MCG/MIN: 10 INJECTION INTRAVENOUS at 13:03

## 2018-04-03 RX ADMIN — ENOXAPARIN SODIUM 60 MG: 60 INJECTION SUBCUTANEOUS at 22:21

## 2018-04-03 RX ADMIN — METHYLPREDNISOLONE SODIUM SUCCINATE 60 MG: 40 INJECTION, POWDER, FOR SOLUTION INTRAMUSCULAR; INTRAVENOUS at 23:46

## 2018-04-03 RX ADMIN — ASPIRIN 81 MG 81 MG: 81 TABLET ORAL at 11:26

## 2018-04-03 RX ADMIN — ALBUTEROL SULFATE 15 MG: 2.5 SOLUTION RESPIRATORY (INHALATION) at 10:30

## 2018-04-03 RX ADMIN — DOCUSATE SODIUM 100 MG: 100 CAPSULE, LIQUID FILLED ORAL at 10:47

## 2018-04-03 RX ADMIN — ENOXAPARIN SODIUM 60 MG: 60 INJECTION SUBCUTANEOUS at 11:26

## 2018-04-03 RX ADMIN — ARFORMOTEROL TARTRATE 15 MCG: 15 SOLUTION RESPIRATORY (INHALATION) at 20:20

## 2018-04-03 RX ADMIN — LEVOFLOXACIN 750 MG: 5 INJECTION, SOLUTION INTRAVENOUS at 10:36

## 2018-04-03 RX ADMIN — IPRATROPIUM BROMIDE AND ALBUTEROL SULFATE 3 ML: .5; 3 SOLUTION RESPIRATORY (INHALATION) at 09:05

## 2018-04-03 RX ADMIN — ROSUVASTATIN CALCIUM 10 MG: 10 TABLET, FILM COATED ORAL at 21:03

## 2018-04-03 RX ADMIN — IPRATROPIUM BROMIDE AND ALBUTEROL SULFATE 3 ML: .5; 3 SOLUTION RESPIRATORY (INHALATION) at 09:18

## 2018-04-03 RX ADMIN — BUDESONIDE 500 MCG: 0.5 INHALANT RESPIRATORY (INHALATION) at 20:20

## 2018-04-03 RX ADMIN — METHYLPREDNISOLONE SODIUM SUCCINATE 60 MG: 40 INJECTION, POWDER, FOR SOLUTION INTRAMUSCULAR; INTRAVENOUS at 17:58

## 2018-04-03 RX ADMIN — METHYLPREDNISOLONE SODIUM SUCCINATE 125 MG: 125 INJECTION, POWDER, FOR SOLUTION INTRAMUSCULAR; INTRAVENOUS at 08:56

## 2018-04-03 RX ADMIN — CEFEPIME HYDROCHLORIDE 2 G: 2 INJECTION, POWDER, FOR SOLUTION INTRAVENOUS at 13:34

## 2018-04-03 RX ADMIN — IOPAMIDOL 100 ML: 755 INJECTION, SOLUTION INTRAVENOUS at 18:00

## 2018-04-03 NOTE — CONSULTS
Date of  Admission: 4/3/2018  8:47 AM     Itzel Monroy is a 78 y.o. female admitted for COPD exacerbation ([facility].). She has  a history of severe COPD (FEV1 in 2013 0.92L, 42% predicted) who presents with several days of shortness of breath and cough that has gotten progressively worse. She was seen in clinic by JODY yesterday and started on doxycyline, but has continued to get worse. This morning she was hypoxic to the 80's. In route she was given decadron and nebs. Initially a code STEMI was called, but EKG on arrival was not consistent with a STEMI. She was very bronchospastic on admission and started on BiPAP and continuous albuterol. She has become progressively more hypotensive and getting a central line in the ED. She denies any chest pains, N or vomitig, diaphoresis, palpitation, syncope. ASSESSMENT:   1. Acute exacerbation of chronic severe COPD. 2. Pulmonary edema, etiology uncertain, Hypoxia can be one. [place]NP of around [address] and minimally elevated Troponin are probably secondary to hypoxia as well. Chronicity of HF needs clarification. There is mild LV systolic dysfunction based on a limited quality echo ( COPD, Tachycardia), Type II MI.   3. CAD, S/P ? 3 V CABG in March of 2017, [facility]. Composition uncertain. 4. Hypotension, doubt cardiac. Echo showed no pericardial effusion. Sepsis syndrome is probable. Pt has stopped HTN meds for a few days for low BP readings. RECOMMENDATION:  1. I agree with current medical treatments ( Vasoporessors, Fluid)  2. Treat as Acute Coronary Syndrome. 3. Check for PE, D-Dimer. 4. Will FU closely.         Patient Active Problem List    Diagnosis Date Noted    COPD exacerbation ([facility] 04/03/2018    Acute respiratory failure with hypoxia ([facility] 04/03/2018    Sinus tachycardia 04/03/2018    CAD (coronary artery disease) 04/03/2018    S/P CABG (coronary artery bypass graft) 04/03/2018    Chronic obstructive pulmonary disease ([facility] 05/09/2016    ACP (advance care planning) 04/26/2016    Asthma 10/11/2012    HTN (hypertension) 04/01/2009    Serum lipids high 04/01/2009    GERD (gastroesophageal reflux disease) 04/01/2009    Menopausal state 04/01/2009      Myles Cuadra MD  Past Medical History:   Diagnosis Date    Asthma 10/11/2012    CAD (coronary artery disease) 4/3/2018    GERD (gastroesophageal reflux disease) 4/1/2009    HTN 4/1/2009    Menopausal state 4/1/2009    Serum lipid, elevated 4/1/2009      Past Surgical History:   Procedure Laterality Date    COLONOSCOPY N/A 8/31/2016    COLONOSCOPY performed by Laila Melo MD at [address] HX CHOLECYSTECTOMY  2012     Allergies   Allergen Reactions    Augmentin [Amoxicillin-Pot Clavulanate] Nausea Only      Family History   Problem Relation Age of Onset    Heart Attack Mother     Hypertension Mother     High Cholesterol Mother     Hypertension Father     Hypertension Brother       Current Facility-Administered Medications   Medication Dose Route Frequency    sodium chloride (NS) flush 5-10 mL  5-10 mL IntraVENous Q8H    sodium chloride (NS) flush 5-10 mL  5-10 mL IntraVENous PRN    albuterol (PROVENTIL VENTOLIN) nebulizer solution 15 mg  15 mg Nebulization CONTINUOUS    sodium chloride (NS) flush 5-10 mL  5-10 mL IntraVENous Q8H    sodium chloride (NS) flush 5-10 mL  5-10 mL IntraVENous PRN    budesonide (PULMICORT) 500 mcg/2 ml nebulizer suspension  500 mcg Nebulization BID RT    methylPREDNISolone (PF) (SOLU-MEDROL) injection 60 mg  60 mg IntraVENous Q6H    docusate sodium (COLACE) capsule 100 mg  100 mg Oral BID    levoFLOXacin (LEVAQUIN) 750 mg in D5W IVPB  750 mg IntraVENous Q24H    arformoterol (BROVANA) neb solution 15 mcg  15 mcg Nebulization BID RT    rosuvastatin (CRESTOR) tablet 10 mg  10 mg Oral QHS    aspirin chewable tablet 81 mg  81 mg Oral EVERY OTHER DAY    enoxaparin (LOVENOX) injection 60 mg  1 mg/kg SubCUTAneous Q12H    pantoprazole (PROTONIX) tablet 40 mg  40 mg Oral DAILY    PHENYLephrine (ELVIE-SYNEPHRINE) 30 mg in 0.9% sodium chloride 250 mL infusion   mcg/min IntraVENous TITRATE    sodium chloride (NS) flush 5-10 mL  5-10 mL IntraVENous PRN    cefepime (MAXIPIME) 2 g in 0.9% sodium chloride (MBP/ADV) 100 mL  2 g IntraVENous Q12H    [START ON 4/4/2018] lisinopril/hydroCHLOROthiazide (PRINZIDE/ZESTORETIC) 10/12.5   Oral DAILY         Review of Symptoms:  A comprehensive review of systems was negative except for that written in the HPI. Physical Exam    Visit Vitals    /62    Pulse (!) 115    Temp 97.5 °F (36.4 °C)    Resp 22    Ht 5' 6\" (1.676 m)    Wt 59 kg (130 lb)    SpO2 100%    BMI 20.98 kg/m2     Visit Vitals    /62    Pulse (!) 115    Temp 97.5 °F (36.4 °C)    Resp 22    Ht 5' 6\" (1.676 m)    Wt 59 kg (130 lb)    SpO2 100%    BMI 20.98 kg/m2     General Appearance:   A & O X 3 , seems comfortable with Bi-Pap. Neck:  No Jvd, CB. Chest:   Diminished air entry, diffuse Wheezing, rales on bases. Cardiovascular:   remote., Tachycardia, No murmurs. Abdomen:    Soft, nontender. Extremities:  Trace edema. Skin:  Dry. Cardiographics    Telemetry: Sinus tachycardia. ECG: normal EKG, normal sinus rhythm, unchanged from previous tracings, sinus tachycardia, Anterior MI, age undetermined. Echocardiogram: Abnormal, and reviewed by myself: LVEF: 40-45%, no pericardial effusion. I Recent radiology, intake/output and wt reviewed.     Labs:   Recent Results (from the past 24 hour(s))   CBC WITH AUTOMATED DIFF    Collection Time: 04/03/18  8:54 AM   Result Value Ref Range    WBC 11.7 (H) 3.6 - 11.0 K/uL    RBC 5.81 (H) 3.80 - 5.20 M/uL    HGB 16.8 (H) 11.5 - 16.0 g/dL    HCT 52.2 (H) 35.0 - 47.0 %    MCV 89.8 80.0 - 99.0 FL    MCH 28.9 26.0 - 34.0 PG    MCHC 32.2 30.0 - 36.5 g/dL    RDW 15.6 (H) 11.5 - 14.5 %    PLATELET 140 541 - 451 K/uL    MPV 10.4 8.9 - 12.9 FL    NRBC 0.0 0  WBC    ABSOLUTE NRBC 0.00 0.00 - 0.01 K/uL    NEUTROPHILS 81 (H) 32 - 75 %    LYMPHOCYTES 14 12 - 49 %    MONOCYTES 4 (L) 5 - 13 %    EOSINOPHILS 0 0 - 7 %    BASOPHILS 0 0 - 1 %    IMMATURE GRANULOCYTES 1 (H) 0.0 - 0.5 %    ABS. NEUTROPHILS 9.5 (H) 1.8 - 8.0 K/UL    ABS. LYMPHOCYTES 1.7 0.8 - 3.5 K/UL    ABS. MONOCYTES 0.5 0.0 - 1.0 K/UL    ABS. EOSINOPHILS 0.0 0.0 - 0.4 K/UL    ABS. BASOPHILS 0.1 0.0 - 0.1 K/UL    ABS. IMM. GRANS. 0.1 (H) 0.00 - 0.04 K/UL    DF AUTOMATED     LACTIC ACID    Collection Time: 04/03/18  9:04 AM   Result Value Ref Range    Lactic acid 3.0 (HH) 0.4 - 2.0 MMOL/L   BLOOD GAS, ARTERIAL    Collection Time: 04/03/18  9:30 AM   Result Value Ref Range    pH 7.18 (LL) 7.35 - 7.45      PCO2 57 (H) 35.0 - 45.0 mmHg    PO2 95 80 - 100 mmHg    O2 SAT 95 92 - 97 %    BICARBONATE 21 (L) 22 - 26 mmol/L    BASE DEFICIT 8.4 mmol/L    O2 METHOD BIPAP      FIO2 40 %    IPAP/PIP 16.0      EPAP/CPAP/PEEP 8.0      Sample source ARTERIAL      SITE LEFT RADIAL      TOSHA'S TEST YES      Critical value read back MJoy Mendes MD    NT-PRO BNP    Collection Time: 04/03/18  9:37 AM   Result Value Ref Range    NT pro-BNP 9738 (H) 0 - 450 PG/ML   TROPONIN I    Collection Time: 04/03/18  9:37 AM   Result Value Ref Range    Troponin-I, Qt. 3.08 (H) <8.09 ng/mL   METABOLIC PANEL, COMPREHENSIVE    Collection Time: 04/03/18  9:37 AM   Result Value Ref Range    Sodium 130 (L) 136 - 145 mmol/L    Potassium 4.5 3.5 - 5.1 mmol/L    Chloride 92 (L) 97 - 108 mmol/L    CO2 23 21 - 32 mmol/L    Anion gap 15 5 - 15 mmol/L    Glucose 167 (H) 65 - 100 mg/dL    BUN 17 6 - 20 MG/DL    Creatinine 0.89 0.55 - 1.02 MG/DL    BUN/Creatinine ratio 19 12 - 20      GFR est AA >60 >60 ml/min/1.73m2    GFR est non-AA >60 >60 ml/min/1.73m2    Calcium 9.3 8.5 - 10.1 MG/DL    Bilirubin, total 0.9 0.2 - 1.0 MG/DL    ALT (SGPT) 53 12 - 78 U/L    AST (SGOT) 90 (H) 15 - 37 U/L    Alk.  phosphatase 106 45 - 117 U/L    Protein, total 7.6 6.4 - 8.2 g/dL    Albumin 3.4 (L) 3.5 - 5.0 g/dL    Globulin 4.2 (H) 2.0 - 4.0 g/dL    A-G Ratio 0.8 (L) 1.1 - 2.2     LACTIC ACID    Collection Time: 04/03/18  1:12 PM   Result Value Ref Range    Lactic acid 3.8 (HH) 0.4 - 2.0 MMOL/L   D DIMER    Collection Time: 04/03/18  1:15 PM   Result Value Ref Range    D-dimer 4.02 (H) 0.00 - 0.65 mg/L FEU   BLOOD GAS, ARTERIAL    Collection Time: 04/03/18  1:45 PM   Result Value Ref Range    pH 7.29 (L) 7.35 - 7.45      PCO2 34 (L) 35.0 - 45.0 mmHg    PO2 149 (H) 80 - 100 mmHg    O2 SAT 99 (H) 92 - 97 %    BICARBONATE 16 (L) 22 - 26 mmol/L    BASE DEFICIT 9.6 mmol/L    O2 METHOD BIPAP      FIO2 40 %    MODE BIPAP      SET RATE 8      SPONTANEOUS RATE 23.0      IPAP/PIP 16.0      EPAP/CPAP/PEEP 8.0      Sample source ARTERIAL      SITE LEFT RADIAL      TOSHA'S TEST YES      Critical value read back LOLITA Chappell MD    URINALYSIS W/ RFLX MICROSCOPIC    Collection Time: 04/03/18  2:44 PM   Result Value Ref Range    Color YELLOW/STRAW      Appearance CLOUDY (A) CLEAR      Specific gravity 1.018 1.003 - 1.030      pH (UA) 6.0 5.0 - 8.0      Protein 100 (A) NEG mg/dL    Glucose NEGATIVE  NEG mg/dL    Ketone 40 (A) NEG mg/dL    Bilirubin NEGATIVE  NEG      Blood NEGATIVE  NEG      Urobilinogen 0.2 0.2 - 1.0 EU/dL    Nitrites NEGATIVE  NEG      Leukocyte Esterase NEGATIVE  NEG      WBC 5-10 0 - 4 /hpf    RBC 0-5 0 - 5 /hpf    Epithelial cells FEW FEW /lpf    Bacteria NEGATIVE  NEG /hpf    Hyaline cast 0-2 0 - 5 /lpf    Granular cast 2-5 (A) NEG /lpf          Brayden Morse MD  4/3/2018

## 2018-04-03 NOTE — ED NOTES
Spoke to Dr. Natalee Butler about possible septic shock given bipap upon arrival, dropping blood pressure. He advised he did not have a source at this time. Advised he would put in sepsis order set, wanted a liter of NS now, and urine specimen for culture and that he was entering orders. Reviewed antibiotic orders, Levaquin and advised he would add another antibiotic. Dr. NIETO had placed the center line and Dr. Natalee Butler updated to placement. Beryle KhatMinor Ronde RN advised on discussion and orders.

## 2018-04-03 NOTE — PROGRESS NOTES
4/3/2018  3:25 PM  EMR reviewed, CM met w/ pt to begin d/c planning. Charted demographics verified, lives w/  Chelsea Quijano ([place]) [phone] in 1 story home w/ 3 entry steps, PTA independent w/ ADL's and drives. Emergency contact is  Piotr Dejesus) [phone],  PCP is Heather Niño MD, NN Carol Foley and Martin Rios notified of admission. Rx Pt prefers [employer]. Pt has not had HH; DME Pt has nebulizer. Care Management Interventions  PCP Verified by CM: Yes Rey Moreira MD)  Last Visit to PCP: 02/15/18  Palliative Care Criteria Met (RRAT>21 & CHF Dx)?: No  Mode of Transport at Discharge: Self ( will transport)  Physical Therapy Consult: No  Occupational Therapy Consult: No  Current Support Network: Lives with Spouse (Pt lives w/  in 1 story home w/ 3 entry steps)  Confirm Follow Up Transport: Self  Discharge Location  Discharge Placement: Home with family assistance  Plan is d/c to home when stable w/ family assistance, if no d/c services recommended, consider H2H/COPD visit.    will transport at d/c.  Brittanie Benson

## 2018-04-03 NOTE — IP AVS SNAPSHOT
[address] [address] 1007 [facility] 
[phone] Patient: Alphonso Mccauley MRN: [id number] VMS:[id number] About your hospitalization You were admitted on:  April 3, 2018 You last received care in the:  [facility] 3 PROG CARE TELE 2 You were discharged on:  April 9, 2018 Why you were hospitalized Your primary diagnosis was:  Copd Exacerbation (Hcc) Your diagnoses also included:  Acute Respiratory Failure With Hypoxia (Hcc), Gerd (Gastroesophageal Reflux Disease), Htn (Hypertension), Sinus Tachycardia, Cad (Coronary Artery Disease) Follow-up Information Follow up With Details Comments Contact Info Jitendra Ortez MD On 4/11/2018 11 am [address] [place], [place] [address] [place] [address] 
[phone] Vy Aldrich MD  April 12 @ 2:15 [address] [facility] [address] [facility] [address] 
[phone] Your Scheduled Appointments Thursday April 12, 2018  2:15 PM EDT TRANSITIONAL CARE MANAGEMENT with Vy Aldrich MD  
[facility] [address]) [address] [place] [address] 1007 [facility]  
[phone] Discharge Orders None A check gabriel indicates which time of day the medication should be taken. My Medications START taking these medications Instructions Each Dose to Equal  
 Morning Noon Evening Bedtime  
 clopidogrel 75 mg Tab Commonly known as:  PLAVIX Start taking on:  4/10/2018 Your last dose was:  75 mg on 4/9/2018  9:39 AM  
Notes to Patient:  New medication - blood thinner to prevent blood clots, heart attack and stroke Take 1 Tab by mouth daily. 75 mg  
    
  
   
   
   
  
 guaiFENesin  mg ER tablet Commonly known as:  Camilo & Camilo Your last dose was:  600 mg on 4/9/2018  9:39 AM  
 Notes to Patient:  New medication to help with congestion/cough. Thins the mucus Take 1 Tab by mouth every twelve (12) hours. 600 mg  
    
   
   
  
   
  
 metoprolol tartrate 25 mg tablet Commonly known as:  LOPRESSOR Your last dose was:  25 mg on 4/9/2018  9:39 AM  
Notes to Patient:  New cardiac medication - beta blocker to help control heart rate and helps heart recover from heart attack Take 1 Tab by mouth two (2) times a day. Indications: Acute Coronary Syndrome, inappropriate sinus tachycardia 25 mg CHANGE how you take these medications Instructions Each Dose to Equal  
 Morning Noon Evening Bedtime  
 lisinopril-hydroCHLOROthiazide 10-12.5 mg per tablet Commonly known as:  Coretta Riley What changed:  Another medication with the same name was removed. Continue taking this medication, and follow the directions you see here. Your last dose was:  9:30 am 4/9 Take 1 Tab by mouth daily for 90 days. 1 Tab  
    
  
   
   
   
  
 predniSONE 20 mg tablet Commonly known as:  Lizette De La Fuente What changed:   
- how much to take 
- how to take this 
- additional instructions Your last dose was: Had IV dose at 6 am 4/9 Your next dose is: With dinner 4/9 Notes to Patient:  Steroid / anti inflammatory. See new instructions 2 tabs twice a day for 4 days, then 1 tab twice a day for 4 days, then 1 tab daily for 4 days, then 1/2 tab daily for 4 days. (30 tabs, 16 days total) CONTINUE taking these medications Instructions Each Dose to Equal  
 Morning Noon Evening Bedtime  
 albuterol 90 mcg/actuation inhaler Commonly known as:  PROVENTIL HFA, VENTOLIN HFA, PROAIR HFA Your last dose was:  Resume at discharge Take 2 Puffs by inhalation every four (4) hours as needed for Wheezing. 2 Puff  
    
   
   
   
  
 aspirin 81 mg chewable tablet Your last dose was:  81 mg on 4/9/2018  9:39 AM  
   
 Take 81 mg by mouth daily. 81 mg  
    
  
   
   
   
  
 budesonide-formoterol 160-4.5 mcg/actuation Hfaa Commonly known as:  SYMBICORT Your last dose was:  Substituted with pulmicort neb while in hospital -resume at discharge Take 2 Puffs by inhalation two (2) times a day. 2 Puff CALCIUM + D PO Your last dose was:  Not given while in hospital - resume at discharge Take 1 Cap by mouth daily. 1 Cap KRILL OIL PO Your last dose was:  Not given while in hospital - resume at discharge Take 1 Cap by mouth daily. 1 Cap  
    
  
   
   
   
  
 magnesium 250 mg Tab Your last dose was:  Not given while in hospital -resume at discharge Take 500 mg by mouth daily. 500 mg  
    
  
   
   
   
  
 omeprazole 20 mg capsule Commonly known as:  PRILOSEC Your last dose was:  9:30 am 4/9 Take 1 Cap by mouth daily for 90 days. This to replace script for tablets 20 mg  
    
  
   
   
   
  
 rosuvastatin 20 mg tablet Commonly known as:  CRESTOR Your last dose was:  20 mg on 4/8/2018  9:10 PM  
   
 Take 20 mg by mouth nightly. 20 mg  
    
   
   
   
  
  
 TUDORZA PRESSAIR 400 mcg/actuation inhaler Generic drug:  aclidinium bromide Your last dose was:  Substituted with Brovana nebulizer while in hospital- resume at discharge Take 1 Puff by inhalation daily. 1 Puff VITAMIN D3 1,000 unit tablet Generic drug:  cholecalciferol Your last dose was:  Not given while in hospital - resume at discharge. Take 1,000 Units by mouth daily. 1000 Units STOP taking these medications   
 doxycycline 100 mg capsule Commonly known as:  [place] Mauricio Where to Get Your Medications These medications were sent to [address] [facility] [address] [facility], [address] [place] [address] Phone:  [phone]  
  clopidogrel 75 mg Tab  
 guaiFENesin  mg ER tablet  
 metoprolol tartrate 25 mg tablet Information on where to get these meds will be given to you by the nurse or doctor. ! Ask your nurse or doctor about these medications  
  predniSONE 20 mg tablet Discharge Instructions HOSPITALIST DISCHARGE INSTRUCTIONS 
NAME: Dana Berg :  1940 MRN:  [id number] Date/Time:  2018 2:26 PM 
 
ADMIT DATE: 4/3/2018 DISCHARGE DATE: 2018 DISCHARGE DIAGNOSIS: 
Acute COPD MEDICATIONS: 
· It is important that you take the medication exactly as they are prescribed. · Keep your medication in the bottles provided by the pharmacist and keep a list of the medication names, dosages, and times to be taken in your wallet. · Do not take other medications without consulting your doctor. Pain Management: per above medications What to do at [facility] Recommended diet:  Cardiac Diet Recommended activity: Activity as tolerated If you experience any of the following symptoms then please call your primary care physician or return to the emergency room if you cannot get hold of your doctor: 
Fever, chills, nausea, vomiting, diarrhea, change in mentation, falling, bleeding, shortness of breath Follow Up: Follow-up Information Follow up With Details Comments Contact Info Shade Chavez MD On 2018 11 am [address] [place], [place] [address] [place] [address] 
[phone] Blanka Ritter MD   [address] [facility] [address] [facility] [address] 
[phone] Information obtained by : 
I understand that if any problems occur once I am at home I am to contact my physician. I understand and acknowledge receipt of the instructions indicated above. Physician's or R.N.'s Signature                                                                  Date/Time Patient or Representative Signature                                                          Date/Time 
 
 
 
 
 
 
 
 
ACO Transitions of Care Introducing [employer] [address] Zamarripa offers a voluntary care coordination program to provide high quality service and care to [facility] fee-for-service beneficiaries. Delmar Adriana was designed to help you enhance your health and well-being through the following services: ? Transitions of Care  support for individuals who are transitioning from one care setting to another (example: Hospital to home). ? Chronic and Complex Care Coordination  support for individuals and caregivers of those with serious or chronic illnesses or with more than one chronic (ongoing) condition and those who take a number of different medications. If you meet specific medical criteria, a [facility] [address] may call you directly to coordinate your care with your primary care physician and your other care providers. For questions about the [facility] programs, please, contact your physicians office. For general questions or additional information about Accountable Care Organizations: 
Please visit www.medicare.gov/acos. html or call 1-800-MEDICARE (7-363.607.7744) TTY users should call 1-124.842.1387. Sovi Announcement We are excited to announce that we are making your provider's discharge notes available to you in trueAnthemt.   You will see these notes when they are completed and signed by the physician that discharged you from your recent hospital stay. If you have any questions or concerns about any information you see in [employer], please call the Health Information Department where you were seen or reach out to your Primary Care Provider for more information about your plan of care. Introducing [address]! Dear Sj Jin: Thank you for requesting a [employer] account. Our records indicate that you already have an active [employer] account. You can access your account anytime at [email or web address] Did you know that you can access your hospital and ER discharge instructions at any time in [employer]? You can also review all of your test results from your hospital stay or ER visit. Additional Information If you have questions, please visit the Frequently Asked Questions section of the [employer] website at [email or web address] Remember, [employer] is NOT to be used for urgent needs. For medical emergencies, dial 911. Now available from your iPhone and Android! Introducing Alejandro Carcamo As a [employer] patient, I wanted to make you aware of our electronic visit tool called Alejandro CamilleValueClick. [employer] 24/7 allows you to connect within minutes with a medical provider 24 hours a day, seven days a week via a mobile device or tablet or logging into a secure website from your computer. You can access Alejandro Carcamo from anywhere in the [place]. A virtual visit might be right for you when you have a simple condition and feel like you just dont want to get out of bed, or cant get away from work for an appointment, when your regular [employer] provider is not available (evenings, weekends or holidays), or when youre out of town and need minor care.   Electronic visits cost only $49 and if the Alejandro Carcamo provider determines a prescription is needed to treat your condition, one can be electronically transmitted to a nearby pharmacy*. Please take a moment to enroll today if you have not already done so. The enrollment process is free and takes just a few minutes. To enroll, please download the E[employer]on [employer] 24/7 gaudencio to your tablet or phone, or visit [email or web address] to enroll on your computer. And, as an [address] [place] [address] patient with a Nomi account, the results of your visits will be scanned into your electronic medical record and your primary care provider will be able to view the scanned results. We urge you to continue to see your regular [facility] provider for your ongoing medical care. And while your primary care provider may not be the one available when you seek a Busap virtual visit, the peace of mind you get from getting a real diagnosis real time can be priceless. For more information on Busap, view our Frequently Asked Questions (FAQs) at [email or web address]. Sincerely, 
 
Marianna Tan MD 
Chief Medical Officer [facility] Caprice Dallin *:  certain medications cannot be prescribed via Busap Unresulted Labs-Please follow up with your PCP about these lab tests Order Current Status OVA & PARASITES, STOOL In process Providers Seen During Your Hospitalization Provider Specialty Primary office phone Maurizio Valle MD Emergency Medicine [phone] [facility] Internal Medicine [phone] Henrry Garcia MD Internal Medicine [phone] Danielito Castaneda MD Internal Medicine [phone] Your Primary Care Physician (PCP) Primary Care Physician Office Phone Office Fax [place], [phone] [facility] [phone] You are allergic to the following Allergen Reactions Augmentin (Amoxicillin-Pot Clavulanate) Nausea Only Recent Documentation Height Weight Breastfeeding? BMI OB Status Smoking Status 1.676 m 56 kg No 19.93 kg/m2 Postmenopausal Former Smoker Emergency Contacts Name Discharge Info Relation Home Work Mobile [address] [facility] CAREGIVER [3] Spouse [3] [phone] Patient Belongings The following personal items are in your possession at time of discharge: 
  Dental Appliances: None  Visual Aid: Glasses, With patient      Home Medications: None   Jewelry: None  Clothing: None    Other Valuables: Cell Phone  Personal Items Sent to Safe: none Discharge Instructions Attachments/References MEFS - CLOPIDOGREL (PLAVIX) - (BY MOUTH) (ENGLISH) MEFS - GUAIFENESIN (ALLFEN, ANTITUSSIN, CHEST CONGESTION RELIEF, CHILDREN'S MUCINEX) - (BY MOUTH) (ENGLISH) MEFS - METOPROLOL (LOPRESSOR, TOPROL XL) - (BY MOUTH) (ENGLISH) Patient Handouts Clopidogrel (Plavix) - (By mouth) Why this medicine is used:  
Helps prevent stroke, heart attack, and other heart problems. Contact a nurse or doctor right away if you have: 
· Sudden or severe headache · Bloody vomit or vomit that looks like coffee grounds; bloody or black, tarry stools · Bleeding that does not stop or bruises that do not heal 
· Dark urine or pale stools, nausea, loss of appetite, stomach pain, · Yellow skin or eyes Common side effects: · Minor bleeding or bruising © 2017 [employer] Information is for End User's use only and may not be sold, redistributed or otherwise used for commercial purposes. Guaifenesin (Allfen, Antitussin, Chest Congestion Relief, Children's Mucinex) - (By mouth) Why this medicine is used: Thins mucus so you can clear it from your head, throat, and lungs. Contact a nurse or doctor right away if you have: · Blood in urine · Pain in side, back, or abdomen Common side effects: · Mild nausea, vomiting © 2017 [employer] Information is for End User's use only and may not be sold, redistributed or otherwise used for commercial purposes. Metoprolol (Lopressor, Toprol XL) - (By mouth) Why this medicine is used:  
Treats high blood pressure, chest pain, and heart failure. Contact a nurse or doctor right away if you have: · Lightheadedness, dizziness, fainting · Rapid weight gain; swelling in your hands, ankles, or feet Common side effects: · Mild dizziness · Tiredness © 2017 [address] Information is for End User's use only and may not be sold, redistributed or otherwise used for commercial purposes. Please provide this summary of care documentation to your next provider. Signatures-by signing, you are acknowledging that this After Visit Summary has been reviewed with you and you have received a copy. Patient Signature:  ____________________________________________________________ Date:  ____________________________________________________________  
  
Shyann Allen Provider Signature:  ____________________________________________________________ Date:  ____________________________________________________________

## 2018-04-03 NOTE — PROGRESS NOTES
[facility]: MED RECONCILIATION    Comments/Recommendations:     Patient is awake, alert, oriented, and knowledgeable about home medications. Pharmacy student reviewed prescription refill history with Rx[facility]. Patients spouse brought in two prescription bottles (Prednisone 20mg and Doxycycline 100mg capsules) and confirm doses and frequency. When asked about the prednisone, patient reports taking four tablets this morning because she had a hard time breathing. Patient was prescribed a 7 day course of Doxycycline 100mg capsules on 4/2. Medications added:     · None    Medications removed:    · None    Medications adjusted:    · Calcium carbonate + Vitamin D3 to one capsule by mouth daily  · Vitamin D3 1000 unit tablet to one tablet daily  · Krill oil PO to one capsule daily  · Prednisone 20mg tablets 3 every day for 5 days, 2 every day for 5 days, 1 every day for 5 days, 1/2 every day for 5 days to 3 every day for 4 days, 2 every day for 5 days, 1 every day for 5 days, 1/2 every day for 5 days  · Rosuvastatin 10mg tablets to Rosuvastatin 20mg tablets at night daily    Allergies: Augmentin [amoxicillin-pot clavulanate]    Prior to Admission Medications   Prescriptions Last Dose Informant Patient Reported? Taking? ASPIRIN 81 mg chewable tablet 4/2/2018 at Unknown time Self Yes Yes   Sig: Take 81 mg by mouth daily. CALCIUM CARBONATE/VITAMIN D3 (CALCIUM + D PO) 4/1/2018 Self Yes Yes   Sig: Take 1 Cap by mouth daily. KRILL OIL PO  Self Yes Yes   Sig: Take 1 Cap by mouth daily. aclidinium bromide (TUDORZA PRESSAIR) 400 mcg/actuation inhaler 4/2/2018 at PM Self Yes Yes   Sig: Take 1 Puff by inhalation daily. albuterol (PROVENTIL HFA, VENTOLIN HFA) 90 mcg/actuation inhaler 4/2/2018 at PM Self No Yes   Sig: Take 2 Puffs by inhalation every four (4) hours as needed for Wheezing.    budesonide-formoterol (SYMBICORT) 160-4.5 mcg/actuation HFA inhaler 4/2/2018 at PM Self No Yes   Sig: Take 2 Puffs by inhalation two (2) times a day. cholecalciferol (VITAMIN D3) 1,000 unit tablet 4/1/2018 Self Yes Yes   Sig: Take 1,000 Units by mouth daily. doxycycline (MONODOX) 100 mg capsule 4/3/2018 at Unknown time Self Yes Yes   Sig: Take 100 mg by mouth two (2) times a day. lisinopril-hydroCHLOROthiazide (PRINZIDE, ZESTORETIC) 10-12.5 mg per tablet 4/1/2018 Self No Yes   Sig: Take 1 Tab by mouth daily for 90 days. magnesium 250 mg tab 4/2/2018 at Unknown time Self Yes Yes   Sig: Take 500 mg by mouth daily. omeprazole (PRILOSEC) 20 mg capsule 4/2/2018 at Unknown time Self No Yes   Sig: Take 1 Cap by mouth daily for 90 days. This to replace script for tablets   predniSONE (DELTASONE) 20 mg tablet 4/3/2018 at Unknown time Self Yes Yes   Sig: Take 20 mg by mouth. 3 every day for 4 days, 2 every day for 5 days, 1 every day for 5 days, 1/2 every day for 5 days   rosuvastatin (CRESTOR) 20 mg tablet 4/1/2018 Self Yes Yes   Sig: Take 20 mg by mouth nightly.       Facility-Administered Medications: None       Thank you,    [address] Student

## 2018-04-03 NOTE — ED NOTES
Code Purple Transfer SBAR        SIRS Criteria HR >90 bpm, RR >20    Mental Status Level of Consciousness: Alert    Labs/Lactic Acid Drawn at:   0904    Fluid resuscitation total 1000mL Infusing    Antibiotics Hanging? YES    Vasopressor Hanging  If Yes, pressor name Neosynephrine    Visit Vitals    /61    Pulse (!) 116    Resp 27    Ht 5' 6\" (1.676 m)    Wt 59 kg (130 lb)    SpO2 100%    BMI 20.98 kg/m2       .

## 2018-04-03 NOTE — PROGRESS NOTES
[facility] Dosing Services: 4/3/18      The following medication: Cefepime was automatically dose-adjusted per [facility] P&T Committee Protocol, with respect to renal function. Dosage changed to:  Cefepime 2gm every 12 hrs      Previous Regimen Cefepime 2gm every 8 hrs   Serum Creatinine Lab Results   Component Value Date/Time    Creatinine 0.89 04/03/2018 09:37 AM    Creatinine (POC) 0.7 04/01/2013 03:44 PM       Creatinine Clearance Estimated Creatinine Clearance: 50.9 mL/min (based on Cr of 0.89). BUN Lab Results   Component Value Date/Time    BUN 17 04/03/2018 09:37 AM           Additional notes:      Pharmacy to continue to monitor patient daily. Will make dosage adjustments based upon changing renal function. Signed Alphonso JC  [address] [place] [address] information:  [phone]

## 2018-04-03 NOTE — ED TRIAGE NOTES
Patient arrives as potential STEMI, wheezing, was room air 84% per EMS.   Cold and clammy, not moving air upon arrival.  Respiratory brings BiPap at 0849

## 2018-04-03 NOTE — PROGRESS NOTES
Spiritual Care Assessment/Progress Note  [address]      NAME: Lorine Aase      MRN: [id number]  AGE: 78 y.o. SEX: female  Religion Affiliation: Daysi   Language: English     4/3/2018     Total Time (in minutes): 15     Spiritual Assessment begun in [facility] EMERGENCY DEPT through conversation with:         []Patient        [x] Family    [] Friend(s)        Reason for Consult: Crisis, Stemi     Spiritual beliefs: (Please include comment if needed)     [] Involved in a dane tradition/spiritual practice:     [] Supported by a dane community:      [] Claims no spiritual orientation:      [] Seeking spiritual identity:           [] Adheres to an individual form of spirituality:      [x] Not able to assess:                     Identified resources for coping:      [] Prayer                  [] Devotional reading               [] Music                  [] Guided Imagery     [x] Family/friends                 [] Pet visits     [] Other:        Interventions offered during this visit: (See comments for more details)          Family/Friend(s): Affirmation of dane, Affirmation of emotions/emotional suffering, Catharsis/review of pertinent events in supportive environment, Normalization of emotional/spiritual concerns     Plan of Care:     [] Discuss Spiritual/Cultural needs    [] Support AMD and/or advance care planning process      [] Support grieving process   [] Coordinate Rites/Rituals    [] Coordination with community clergy   [x] No spiritual needs identified at this time   [] Detailed Plan of Care below (See Comments)  [] Make referral to Music Therapy  [] Make referral to Pet Therapy     [] Make referral to Addiction services  [] Make referral to [facility]  [] Make referral to Spiritual Care Partner  [] No future visits requested             Comments:  responded to Code Stemi in ER. Pt's , Billy Bejarano, arrived a few minutes after the pt.   provided support to  while pt was working with staff. Mr. Harvey Colmenares shared that he noticed the pt having trouble breathing and in some distress this am. He shared that he was doing alright and was not in need of spiritual support at this time. He shared appreciation of and mentioned that he will contact us if any needs rise. Advised of availability and assured him of continued support as needed/ desired. Nunu Winslow M.S.   Spiritual Care Department  If needs rise please call Kota-J CARLOS ([phone])

## 2018-04-03 NOTE — ED NOTES
TRANSFER - OUT REPORT:    Verbal report given to [facility] RN(name) on Artemio Gonzales  being transferred to ICU 8(unit) for routine progression of care       Report consisted of patients Situation, Background, Assessment and   Recommendations(SBAR). Information from the following report(s) SBAR, ED Summary, [facility] and Recent Results was reviewed with the receiving nurse. Lines:   Triple Lumen 04/03/18 Left Subclavian (Active)   Central Line Being Utilized Yes 4/3/2018  1:04 PM   Site Assessment Clean, dry, & intact 4/3/2018  1:04 PM   Proximal Hub Color/Line Status White 4/3/2018  1:04 PM   Medial Hub Color/Line Status Blue 4/3/2018  1:04 PM   Distal Hub Color/Line Status Red 4/3/2018  1:04 PM       Peripheral IV 04/03/18 Right Antecubital (Active)   Site Assessment Clean, dry, & intact 4/3/2018  9:31 AM   Phlebitis Assessment 0 4/3/2018  9:31 AM   Infiltration Assessment 0 4/3/2018  9:31 AM   Dressing Status Clean, dry, & intact 4/3/2018  9:31 AM   Dressing Type Transparent 4/3/2018  9:31 AM   Hub Color/Line Status Pink 4/3/2018  9:31 AM       Peripheral IV 04/03/18 Left Forearm (Active)   Site Assessment Clean, dry, & intact 4/3/2018  9:39 AM   Phlebitis Assessment 0 4/3/2018  9:39 AM   Infiltration Assessment 0 4/3/2018  9:39 AM   Dressing Status Clean, dry, & intact 4/3/2018  9:39 AM   Dressing Type Transparent 4/3/2018  9:39 AM   Hub Color/Line Status Pink 4/3/2018  9:39 AM        Opportunity for questions and clarification was provided.       Patient transported with:   Monitor  Registered Nurse

## 2018-04-03 NOTE — H&P
[facility]    Hospitalist Admission Note      NAME:  Dana Krausudent   :   1940   MRN:  [id number]     PCP:  Riya Forde MD     Date/Time:  4/3/2018 10:30 AM          Subjective:     CHIEF COMPLAINT: SOB     HISTORY OF PRESENT ILLNESS:     Ms. Fredrica Hodgkins is a 78 y.o.  female with a hx of COPD/Asthma, GERD, HTN who presented to the Emergency Department complaining of SOB/MOSLEY and cough starting on 3/31. Sx have progressively gotten worse despite home breathing treatments. Seen by Soren Ramon MD yesterday and was given rx for acute bronchitis but sx worsened. This AM, patient became cold and diaphoretic and sats in low 80s with EMS. Given duoneb, decadron and pre-hospital 12 lead concerning for STEMI but patient without chest pain and arrival ECG in ED not c/w STEMI. In ED, sx continued to worsen and placed on BiPAP and continuous nebs. We will admit for further management.       Past Medical History:   Diagnosis Date    Asthma 10/11/2012    GERD (gastroesophageal reflux disease) 2009    HTN 2009    Menopausal state 2009    Serum lipid, elevated 2009        Past Surgical History:   Procedure Laterality Date    COLONOSCOPY N/A 2016    COLONOSCOPY performed by Jimbo Lama MD at [address] [place] [address]         Social History   Substance Use Topics    Smoking status: Former Smoker     Packs/day: 1.00     Years: 20.00     Types: Cigarettes     Quit date: 1980    Smokeless tobacco: Never Used    Alcohol use 0.6 oz/week     1 Glasses of wine per week      Comment: daily glass of wine        Family History   Problem Relation Age of Onset    Heart Attack Mother     Hypertension Mother     High Cholesterol Mother     Hypertension Father     Hypertension Brother       Family hx cannot be fully assessed, due to the admitting conditions    Allergies   Allergen Reactions    Augmentin [Amoxicillin-Pot Clavulanate] Nausea Only        Prior to Admission medications    Medication Sig Start Date End Date Taking? Authorizing Provider   doxycycline (MONODOX) 100 mg capsule Take 100 mg by mouth two (2) times a day. Yes Leigh Bocanegra MD   rosuvastatin (CRESTOR) 10 mg tablet Take 10 mg by mouth nightly. Yes Leigh Bocanegra MD   omeprazole (PRILOSEC) 20 mg capsule Take 1 Cap by mouth daily for 90 days. This to replace script for tablets 3/10/18 6/8/18 Yes Casandra Chris MD   predniSONE (DELTASONE) 20 mg tablet 3 every day for 5 days, 2 every day for 5 days, 1 every day for 5 days, 1/2 every day for 5 days 2/14/18  Yes Casandra Chris MD   KRILL OIL PO Take  by mouth daily. Yes Historical Provider   budesonide-formoterol (SYMBICORT) 160-4.5 mcg/actuation HFA inhaler Take 2 Puffs by inhalation two (2) times a day. 11/9/15  Yes Casandra Chris MD   magnesium 250 mg tab Take 500 mg by mouth daily. Yes Historical Provider   aclidinium bromide (TUDORZA PRESSAIR) 400 mcg/actuation inhaler Take 1 Puff by inhalation. Yes Historical Provider   cholecalciferol (VITAMIN D3) 1,000 unit tablet Take  by mouth daily. Yes Historical Provider   albuterol (PROVENTIL HFA, VENTOLIN HFA) 90 mcg/actuation inhaler Take 2 Puffs by inhalation every four (4) hours as needed for Wheezing. 11/7/12  Yes Casandra Chris MD   ASPIRIN 81 mg chewable tablet Take 81 mg by mouth every other day. Yes Historical Provider   lisinopril-hydroCHLOROthiazide (PRINZIDE, ZESTORETIC) 10-12.5 mg per tablet Take 1 Tab by mouth daily for 90 days. 1/11/18 4/11/18  Casandra Chris MD   lisinopril-hydroCHLOROthiazide (PRINZIDE, ZESTORETIC) 10-12.5 mg per tablet Take 1 Tab by mouth daily for 90 days. 11/23/16 2/28/17  Casandra Chris MD   CALCIUM CARBONATE/VITAMIN D3 (CALCIUM + D PO) Take  by mouth.     Historical Provider       Review of Systems:  (bold if positive, if negative)    Gen:  Eyes:  ENT:  CVS:  Pulm:  GI:    :    MS:  Skin:  Psych:  Endo:    Hem:  Renal: Neuro:        Objective:      VITALS:    Vital signs reviewed; most recent are:    Visit Vitals    BP 99/76    Pulse (!) 133    Resp (!) 31    Ht 5' 6\" (1.676 m)    Wt 59 kg (130 lb)    SpO2 97%    BMI 20.98 kg/m2     SpO2 Readings from Last 6 Encounters:   04/03/18 97%   02/15/18 95%   02/14/18 96%   02/09/18 93%   01/15/18 97%   11/10/17 99%        No intake or output data in the 24 hours ending 04/03/18 1030     Exam:     Physical Exam:    Gen:  Well-developed, well-nourished, in moderate-severe distress  HEENT:  Pink conjunctivae, PERRL, hearing intact to voice, dry mucous membranes, BiPAP in place  Neck:  Supple, without masses, thyroid non-tender  Resp:  +++ accessory muscle use, poor air movement, expiratory wheezing  Card: Tachy, regular No murmurs, normal S1, S2 without thrills, bruits or peripheral edema  Abd:  Soft, non-tender, non-distended, normoactive bowel sounds are present, no palpable organomegaly and no detectable hernias  Lymph:  No cervical or inguinal adenopathy  Musc:  No cyanosis or clubbing  Skin:  No rashes or ulcers, skin turgor is good  Neuro:  Cranial nerves are grossly intact, no focal motor weakness, follows commands appropriately  Psych:  Good insight, oriented to person, place and time, alert     Labs:    Recent Labs      04/03/18   0854   WBC  11.7*   HGB  16.8*   HCT  52.2*   PLT  338     Recent Labs      04/03/18   0937   NA  130*   K  4.5   CL  92*   CO2  23   GLU  167*   BUN  17   CREA  0.89   CA  9.3   ALB  3.4*   TBILI  0.9   SGOT  90*   ALT  53     No results found for: GLUCPOC  Recent Labs      04/03/18   0930   PH  7.18*   PCO2  57*   PO2  95   HCO3  21*   FIO2  40     No results for input(s): INR in the last 72 hours.     No lab exists for component: INREXT  All Micro Results     Procedure Component Value Units Date/Time    CULTURE, RESPIRATORY/SPUTUM/BRONCH Evertt Sleight [[id number]]     Order Status:  Sent Specimen:  Sputum from Sputum     RESPIRATORY Josiah Drake [[id number]]     Order Status:  Sent Specimen:  NASOPHARYNGEAL SWAB           I have reviewed previous records       Assessment and Plan:      Principal Problem:    COPD exacerbation. Severe. Admit to ICU. Pulm/CC consult. Continue BiPAP and supp oxy. Continuous albuterol. IV steroids. Levofloxacin. ICS/LABA. Check viral panel. Check sputum culture. Active Problems:    Pulmonary edema. Noted on CXR but clinically appears dry. With resp distress and increased FiO2 requirements but hypovolemic exam, will hold on IVF and hold on diuretics. If sats drop further, will need to give IV diuresis. Check echo. Serial Prosper. HTN (hypertension) (4/1/2009). Resume home lisinopril/HCTZ    GERD (gastroesophageal reflux disease) (4/1/2009). Resume PPI    Acute respiratory failure with hypoxia ([facility] [address] (4/3/2018). 2/2 principal problem. Continue supplemental oxygen and NIPPV. Not on home oxygen at baseline     Sinus tachycardia (4/3/2018) / Leukocytosis / SIRS. No obvious source. Levofloxacin for severe COPD exacerbation. Monitor    ADDENDUM:    Marked troponin elevation / NSTEMI. Elevation is rather significant for acuity of SOB decompensation and raises concern for true ACS despite lack of traditional chest pain. Cardiology consult. TTE. Serial Prosper. No contraindication so will empirically anticoagulate with LMWH and keep NPO. Already given aspirin. Restart statin. May need BB but defer to cardiology    CAD / Hx of CABG. Not listed in any PCP notes. Wires noted on CXR. Had CABG at [facility] in 3/2017. Need to get records. Stat TTE pending. Cardiology aware. ADDENDUM:    Undifferentiated shock: BP trended sharply downward and lactate elevated. She does not have an obvious infectious source but septic/distributive shock is being entertained. For this, a CVC has been placed, IV cefepime added to levofloxacin, Bcx obtained, and pressors started.  Cardiogenic shock is less likely given that she continues to be warm and well-perfused (AAOx4), cardiology is following and has reviewed echo. Obstructive shock is also reasonable (maybe PE?), she is too unstable for a CTA at this time and index of suspicion not high enough to warrant CTA with possible lytics, will empirically treat with 1 mg/kg lovenox.     Case discussed multiple times and at length with cardiology, critical care, RN ICU, RN ED, ED MD       Telemetry reviewed:   Sinus tachycardia    Risk of deterioration: high      Total CRITICAL CARE time spent with patient: [address] [place] [address] discussed with: Patient, Family, Nursing Staff, Consultant/Specialist and >50% of time spent in counseling and coordination of care    Discussed:  Code Status, Care Plan and D/C Planning       ___________________________________________________    Attending Physician: Oliverio Velasco, DO

## 2018-04-03 NOTE — CONSULTS
[facility]     Name: Nicolette Vera MRN: [id number]   : 1940 Hospital: [address]   Date: 4/3/2018        Impression Plan   1. Acute hypercapnic and hypoxic respiratory failure  2. COPD with exacerbation  3. Hypotension  4. Abnormal chest imaging, CXR concerning for pulmonary edema  5. GERD               · Continue BiPAP  · Goal sats >90%  · Continuous albuterol  · Brovana/Pulmicort  · Levofloxacin  · IV steroids  · Will repeat an ABG  · Pressors for goal MAP >65, once central line placement is confirmed can transition to levophed  · Follow-up cultures  · Viral respiratory panel pending  · Cardiology consulted  · Trend cardiac markers  · Lovenox  · Protonix  · NPO         Pt is critically ill and at risk of decompensation in the setting of acute respiratory failure and hypotension. Critical care time spent with pt exclusive of procedures was 40 minutes    Radiology  ( personally reviewed) CXR with increased bilateral interstitial markings concerning for pulmonary edema  TTE report reviewed:EF 23-34%, grade 1 diastolic dysfunction, PASP 25   ABG No results for input(s): PHI, PO2I, PCO2I in the last 72 hours. Subjective     This patient has been seen and evaluated at the request of Dr. Conrado Patiño for acute hypercapnic respiratory failure and COPD exacerbation. Patient is a 78 y.o. female with a history of severe COPD (FEV1 in  0.92L, 42% predicted) who presents with several days of shortness of breath and cough that has gotten progressively worse. She was seen in clinic by PAR yesterday and started on doxycyline, but has continued to get worse. This morning she was hypoxic to the 80's. In route she was given decadron and nebs. Initially a code STEMI was called, but EKG on arrival was not consistent with a STEMI. She was very bronchospastic on admission and started on BiPAP and continuous albuterol.  She has become progressively more hypotensive and getting a central line in the ED. Review of Systems:  A comprehensive review of systems was negative except for that written in the HPI. Past Medical History:   Diagnosis Date    Asthma 10/11/2012    CAD (coronary artery disease) 4/3/2018    GERD (gastroesophageal reflux disease) 4/1/2009    HTN 4/1/2009    Menopausal state 4/1/2009    Serum lipid, elevated 4/1/2009      Past Surgical History:   Procedure Laterality Date    COLONOSCOPY N/A 8/31/2016    COLONOSCOPY performed by Elli Hagen MD at [address] [place] [address]      Prior to Admission medications    Medication Sig Start Date End Date Taking? Authorizing Provider   doxycycline (MONODOX) 100 mg capsule Take 100 mg by mouth two (2) times a day. Yes Leigh Bocanegra MD   predniSONE (DELTASONE) 20 mg tablet Take 20 mg by mouth. 3 every day for 4 days, 2 every day for 5 days, 1 every day for 5 days, 1/2 every day for 5 days   Yes Historical Provider   rosuvastatin (CRESTOR) 20 mg tablet Take 20 mg by mouth nightly. Yes Historical Provider   omeprazole (PRILOSEC) 20 mg capsule Take 1 Cap by mouth daily for 90 days. This to replace script for tablets 3/10/18 6/8/18 Yes Eze Thornton MD   lisinopril-hydroCHLOROthiazide (PRINZIDE, ZESTORETIC) 10-12.5 mg per tablet Take 1 Tab by mouth daily for 90 days. 1/11/18 4/11/18 Yes Eze Thornton MD   KRILL OIL PO Take 1 Cap by mouth daily. Yes Historical Provider   budesonide-formoterol (SYMBICORT) 160-4.5 mcg/actuation HFA inhaler Take 2 Puffs by inhalation two (2) times a day. 11/9/15  Yes Eze Thornton MD   magnesium 250 mg tab Take 500 mg by mouth daily. Yes Historical Provider   aclidinium bromide (TUDORZA PRESSAIR) 400 mcg/actuation inhaler Take 1 Puff by inhalation daily. Yes Historical Provider   cholecalciferol (VITAMIN D3) 1,000 unit tablet Take 1,000 Units by mouth daily.    Yes Historical Provider   albuterol (PROVENTIL HFA, VENTOLIN HFA) 90 mcg/actuation inhaler Take 2 Puffs by inhalation every four (4) hours as needed for Wheezing. 11/7/12  Yes Anand Ponce MD   CALCIUM CARBONATE/VITAMIN D3 (CALCIUM + D PO) Take 1 Cap by mouth daily. Yes Historical Provider   ASPIRIN 81 mg chewable tablet Take 81 mg by mouth daily.    Yes Historical Provider     Current Facility-Administered Medications   Medication Dose Route Frequency    sodium chloride (NS) flush 5-10 mL  5-10 mL IntraVENous Q8H    albuterol (PROVENTIL VENTOLIN) nebulizer solution 15 mg  15 mg Nebulization CONTINUOUS    sodium chloride (NS) flush 5-10 mL  5-10 mL IntraVENous Q8H    budesonide (PULMICORT) 500 mcg/2 ml nebulizer suspension  500 mcg Nebulization BID RT    methylPREDNISolone (PF) (SOLU-MEDROL) injection 60 mg  60 mg IntraVENous Q6H    docusate sodium (COLACE) capsule 100 mg  100 mg Oral BID    levoFLOXacin (LEVAQUIN) 750 mg in D5W IVPB  750 mg IntraVENous Q24H    arformoterol (BROVANA) neb solution 15 mcg  15 mcg Nebulization BID RT    rosuvastatin (CRESTOR) tablet 10 mg  10 mg Oral QHS    aspirin chewable tablet 81 mg  81 mg Oral EVERY OTHER DAY    enoxaparin (LOVENOX) injection 60 mg  1 mg/kg SubCUTAneous Q12H    pantoprazole (PROTONIX) tablet 40 mg  40 mg Oral DAILY    PHENYLephrine (ELVIE-SYNEPHRINE) 30 mg in 0.9% sodium chloride 250 mL infusion   mcg/min IntraVENous TITRATE     Allergies   Allergen Reactions    Augmentin [Amoxicillin-Pot Clavulanate] Nausea Only      Social History   Substance Use Topics    Smoking status: Former Smoker     Packs/day: 1.00     Years: 20.00     Types: Cigarettes     Quit date: 1/1/1980    Smokeless tobacco: Never Used    Alcohol use 0.6 oz/week     1 Glasses of wine per week      Comment: daily glass of wine      Family History   Problem Relation Age of Onset    Heart Attack Mother     Hypertension Mother     High Cholesterol Mother     Hypertension Father     Hypertension Brother           Laboratory: I have personally reviewed the critical care flowsheet and labs.      Recent Labs      04/03/18   0854   WBC  11.7*   HGB  16.8*   HCT  52.2*   PLT  338     Recent Labs      04/03/18   0937   NA  130*   K  4.5   CL  92*   CO2  23   GLU  167*   BUN  17   CREA  0.89   CA  9.3   ALB  3.4*   SGOT  90*   ALT  53       Objective:     Mode Rate Tidal Volume Pressure FiO2 PEEP            40 %       Vital Signs:     TMAX(24)      Intake/Output:   Last shift:      Ventilator Volumes  Vt Spont (ml): 400 ml  Ve Observed (l/min): 14.4 l/min  Last 3 shifts:  [id number] intake or output data in the 24 hours ending 04/03/18 1257  EXAM:   GENERAL: well developed and in no distress, on BiPAP, HEENT:  PERRL, EOMI, no alar flaring or epistaxis, oral mucosa moist without cyanosis, NECK:  no jugular vein distention, no retractions, no thyromegaly or masses, LUNGS: decreased breath sounds billaterally and with wheezing , HEART:  Regular rate and rhythm with no MGR; no edema is present, ABDOMEN:  soft with no tenderness, bowel sounds present, EXTREMITIES:  warm with no cyanosis, SKIN:  no jaundice or ecchymosis and NEUROLOGIC:  alert and oriented, grossly non-focal    Brain MD Miryam  [facility] [place]

## 2018-04-03 NOTE — IP AVS SNAPSHOT
[address] [facility] 
 
 
 [address] [facility] [address] [facility] 
[phone] Patient: Gurdeep Georges MRN: [id number] OB A check gabriel indicates which time of day the medication should be taken. My Medications START taking these medications Instructions Each Dose to Equal  
 Morning Noon Evening Bedtime  
 clopidogrel 75 mg Tab Commonly known as:  PLAVIX Start taking on:  4/10/2018 Your last dose was:  75 mg on 2018  9:39 AM  
Notes to Patient:  New medication - blood thinner to prevent blood clots, heart attack and stroke Take 1 Tab by mouth daily. 75 mg  
    
  
   
   
   
  
 guaiFENesin  mg ER tablet Commonly known as:  ION Signature Your last dose was:  600 mg on 2018  9:39 AM  
Notes to Patient:  New medication to help with congestion/cough. Thins the mucus Take 1 Tab by mouth every twelve (12) hours. 600 mg  
    
   
   
  
   
  
 metoprolol tartrate 25 mg tablet Commonly known as:  LOPRESSOR Your last dose was:  25 mg on 2018  9:39 AM  
Notes to Patient:  New cardiac medication - beta blocker to help control heart rate and helps heart recover from heart attack Take 1 Tab by mouth two (2) times a day. Indications: Acute Coronary Syndrome, inappropriate sinus tachycardia 25 mg CHANGE how you take these medications Instructions Each Dose to Equal  
 Morning Noon Evening Bedtime  
 lisinopril-hydroCHLOROthiazide 10-12.5 mg per tablet Commonly known as:  Juarez Eubanks What changed:  Another medication with the same name was removed. Continue taking this medication, and follow the directions you see here. Your last dose was:  9:30 am  Take 1 Tab by mouth daily for 90 days. 1 Tab  
    
  
   
   
   
  
 predniSONE 20 mg tablet Commonly known as:  Alberto Blount What changed:   
- how much to take 
- how to take this - additional instructions Your last dose was: Had IV dose at 6 am 4/9 Your next dose is: With dinner 4/9 Notes to Patient:  Steroid / anti inflammatory. See new instructions 2 tabs twice a day for 4 days, then 1 tab twice a day for 4 days, then 1 tab daily for 4 days, then 1/2 tab daily for 4 days. (30 tabs, 16 days total) CONTINUE taking these medications Instructions Each Dose to Equal  
 Morning Noon Evening Bedtime  
 albuterol 90 mcg/actuation inhaler Commonly known as:  PROVENTIL HFA, VENTOLIN HFA, PROAIR HFA Your last dose was:  Resume at discharge Take 2 Puffs by inhalation every four (4) hours as needed for Wheezing. 2 Puff  
    
   
   
   
  
 aspirin 81 mg chewable tablet Your last dose was:  81 mg on 4/9/2018  9:39 AM  
   
 Take 81 mg by mouth daily. 81 mg  
    
  
   
   
   
  
 budesonide-formoterol 160-4.5 mcg/actuation Hfaa Commonly known as:  SYMBICORT Your last dose was:  Substituted with pulmicort neb while in hospital -resume at discharge Take 2 Puffs by inhalation two (2) times a day. 2 Puff CALCIUM + D PO Your last dose was:  Not given while in hospital - resume at discharge Take 1 Cap by mouth daily. 1 Cap KRILL OIL PO Your last dose was:  Not given while in hospital - resume at discharge Take 1 Cap by mouth daily. 1 Cap  
    
  
   
   
   
  
 magnesium 250 mg Tab Your last dose was:  Not given while in hospital -resume at discharge Take 500 mg by mouth daily. 500 mg  
    
  
   
   
   
  
 omeprazole 20 mg capsule Commonly known as:  PRILOSEC Your last dose was:  9:30 am 4/9 Take 1 Cap by mouth daily for 90 days. This to replace script for tablets 20 mg  
    
  
   
   
   
  
 rosuvastatin 20 mg tablet Commonly known as:  CRESTOR Your last dose was:  20 mg on 4/8/2018  9:10 PM  
   
 Take 20 mg by mouth nightly. 20 mg  
    
   
   
   
  
  
 TUDORZA PRESSAIR 400 mcg/actuation inhaler Generic drug:  aclidinium bromide Your last dose was:  Substituted with Brovana nebulizer while in hospital- resume at discharge Take 1 Puff by inhalation daily. 1 Puff VITAMIN D3 1,000 unit tablet Generic drug:  cholecalciferol Your last dose was:  Not given while in hospital - resume at discharge. Take 1,000 Units by mouth daily. 1000 Units STOP taking these medications   
 doxycycline 100 mg capsule Commonly known as:  Karl Putnam Where to Get Your Medications These medications were sent to [facility], [address] [facility], [address], [address] [place] [address] Phone:  [phone]  
  clopidogrel 75 mg Tab  
 guaiFENesin  mg ER tablet  
 metoprolol tartrate 25 mg tablet Information on where to get these meds will be given to you by the nurse or doctor. ! Ask your nurse or doctor about these medications  
  predniSONE 20 mg tablet

## 2018-04-03 NOTE — ED PROVIDER NOTES
HPI Comments: 78 y.o. female with past medical history significant for asthma, hypertension, and GERD who presents from home via EMS with chief complaint of shortness of breath. History is limited since the patient is in severe respiratory distress and was mostly obtained from EMS. Patient states onset of shortness of breath over the last 3 days that progressively worsened this morning. EMS found the patient diaphoretic and cold with oxygen saturation at 84%. EMS administered duoneb, decadron, and albuterol treatment with minimal relief en route to the hospital. Patient also Received aspirin 324 mg just PTA after finding STEMI on the monitor. EMS note the patient has not had chest pain over the last few weeks and has never had an MI. There are no other acute medical concerns at this time. Old Chart Review: Per note, patient was last evaluated by her PCP, Dr. Regine Arriaga, on 02/15/18 for f/u of COPD and mild difficulty breathing. Patient was initially treated with Levaquin and Prednisone taper on 02/14/18 for cough, shortness of breath, and chest tightness. Social hx: Tobacco Use: No (former smoker), Alcohol Use: Yes, Drug Use: No    PCP: Rafi Guzman MD    Note written by Jorge Gibbs, as dictated by Alexandria North MD 8:45 AM    Full history, physical exam, and ROS unable to be obtained due to:  Condition of the patient. The history is provided by the patient, medical records and the EMS personnel. The history is limited by the condition of the patient.         Past Medical History:   Diagnosis Date    Asthma 10/11/2012    GERD (gastroesophageal reflux disease) 4/1/2009    HTN 4/1/2009    Menopausal state 4/1/2009    Serum lipid, elevated 4/1/2009       Past Surgical History:   Procedure Laterality Date    COLONOSCOPY N/A 8/31/2016    COLONOSCOPY performed by Sebastian Frank MD at [facility] HX CHOLECYSTECTOMY  2012         Family History:   Problem Relation Age of Onset    Heart Attack Mother     Hypertension Mother     High Cholesterol Mother     Hypertension Father     Hypertension Brother        Social History     Social History    Marital status:      Spouse name: N/A    Number of children: N/A    Years of education: N/A     Occupational History    Not on file. Social History Main Topics    Smoking status: Former Smoker     Packs/day: 1.00     Years: 20.00     Types: Cigarettes     Quit date: 1/1/1980    Smokeless tobacco: Never Used    Alcohol use 0.6 oz/week     1 Glasses of wine per week      Comment: daily glass of wine    Drug use: No    Sexual activity: No     Other Topics Concern    Not on file     Social History Narrative         ALLERGIES: Augmentin [amoxicillin-pot clavulanate]    Review of Systems   Unable to perform ROS: Severe respiratory distress   Constitutional: Negative for fever. Respiratory: Positive for shortness of breath and wheezing. Cardiovascular: Positive for chest pain. Negative for leg swelling. Vitals:    04/03/18 0851   BP: (!) 156/121   Pulse: (!) 123   Resp: (!) 36   SpO2: (!) 78%   Weight: 59 kg (130 lb)   Height: 5' 6\" (1.676 m)            Physical Exam   Constitutional: She is oriented to person, place, and time. She appears well-developed and well-nourished. She appears distressed. HENT:   Head: Normocephalic and atraumatic. Eyes: Conjunctivae and EOM are normal.   Neck: Normal range of motion. No tracheal deviation present. Cardiovascular: Regular rhythm, normal heart sounds and intact distal pulses. Tachycardia present. No murmur heard. Pulmonary/Chest: Accessory muscle usage present. No stridor. Tachypnea noted. She is in respiratory distress. She has wheezes. She has rales. Abdominal: Soft. Bowel sounds are normal. There is no tenderness. Musculoskeletal: Normal range of motion. She exhibits no edema, tenderness or deformity.    Neurological: She is alert and oriented to person, place, and time. Skin: Skin is warm and dry. She is not diaphoretic. Psychiatric: Her mood appears anxious. Nursing note and vitals reviewed. MDM  Number of Diagnoses or Management Options  Acute respiratory failure with hypoxia and hypercapnia [facility]):   Chronic obstructive pulmonary disease with acute exacerbation [facility]):   Diagnosis management comments: Patient in respiratory distress - prehospital STEMI alert NOT confirmed in the ED and stemi alert cancelled. Cardiology cath team and cardiologist at bedside in agreement. Check labs, CXR, EKG (done), start Bipap for respiratory distress and will get ABG after about 20 minutes. Patient to get albuterol nebs and IV steroids. Suspect respiratory failure from COPD flare, possible ACS but not a STEMI, possible CHF or pneumonia       Amount and/or Complexity of Data Reviewed  Clinical lab tests: reviewed and ordered  Tests in the radiology section of CPT®: ordered and reviewed  Obtain history from someone other than the patient: yes (, EMS)  Discuss the patient with other providers: yes  Independent visualization of images, tracings, or specimens: yes    Risk of Complications, Morbidity, and/or Mortality  Presenting problems: high  Diagnostic procedures: high  Management options: high    Critical Care  Total time providing critical care:  minutes (Total critical care time spent exclusive of procedures:  75 minutes, discussion with patient, family, and other providers.  Review of labs, imaging and beginning pressor support.)        ED Course       Central Line  Date/Time: 4/3/2018 1:05 PM  Performed by: Bharti Lee  Authorized by: Bharti CONWAY     Consent:     Consent obtained:  Written    Consent given by:  Patient    Risks discussed:  Arterial puncture, incorrect placement, bleeding, infection and pneumothorax    Alternatives discussed:  No treatment  Pre-procedure details:     Hand hygiene: Hand hygiene performed prior to insertion      Sterile barrier technique: All elements of maximal sterile technique followed      Skin preparation:  2% chlorhexidine and alcohol    Skin preparation agent: Skin preparation agent completely dried prior to procedure    Anesthesia (see MAR for exact dosages): Anesthesia method:  None  Procedure details:     Location:  L internal jugular    Site selection rationale:  Patient lying on her right side    Patient position:  Reverse Trendelenburg    Procedural supplies:  Triple lumen    Catheter size:  7.5 Fr    Landmarks identified: yes      Ultrasound guidance: yes (18 guage peripheral IJ placed )      Number of attempts:  1    Successful placement: yes (wire placed through peripheral IJ after repeated cleaning of skin and catheter)    Post-procedure details:     Post-procedure:  Dressing applied and line sutured    Assessment:  Blood return through all ports, placement verified by x-ray, free fluid flow and no pneumothorax on x-ray    Patient tolerance of procedure: Tolerated well, no immediate complications        ED EKG interpretation:  Rhythm: undetermined rhythm; and regular . Rate (approx.): 128; Axis: normal; ST/T wave: septal infarct, age undetermined; Lateral infarct, age undetermined. No signs of acute STEMI. Note written by Jorge Cooper, as dictated by Adiel Turner MD 8:51 AM    10:00 AM  Spoke with  to update on plan, which is to admit to the hospital for further evaluation and care. Will place patient on continuous albuterol. CONSULT NOTE:  10:04 AM Adiel Turner MD spoke with Dr. Kia Hobbs, Consult for Hospitalist.  Discussed available diagnostic tests and clinical findings. Provider is in agreement with care plans as outlined.  Provider will evaluate the patient for admission to the hospital.             VITALS:   Patient Vitals for the past 8 hrs:   Pulse Resp BP SpO2   04/03/18 0945 (!) 129 (!) 36 - 96 %   04/03/18 0939 (!) 127 (!) 31 - 96 %   04/03/18 0930 (!) 127 23 (!) 151/92 93 %   04/03/18 0918 - - - 95 %   04/03/18 0915 (!) 121 (!) 40 148/82 93 %   04/03/18 0901 (!) 132 (!) 42 (!) 157/127 96 %   04/03/18 0900 (!) 132 (!) 41 (!) 159/112 96 %   04/03/18 0855 (!) 131 (!) 38 - 98 %   04/03/18 0854 (!) 131 (!) 31 166/83 -   04/03/18 0851 (!) 123 (!) 36 (!) 163/112 (!) 78 %                  Recent Results (from the past 24 hour(s))   CBC WITH AUTOMATED DIFF    Collection Time: 04/03/18  8:54 AM   Result Value Ref Range    WBC 11.7 (H) 3.6 - 11.0 K/uL    RBC 5.81 (H) 3.80 - 5.20 M/uL    HGB 16.8 (H) 11.5 - 16.0 g/dL    HCT 52.2 (H) 35.0 - 47.0 %    MCV 89.8 80.0 - 99.0 FL    MCH 28.9 26.0 - 34.0 PG    MCHC 32.2 30.0 - 36.5 g/dL    RDW 15.6 (H) 11.5 - 14.5 %    PLATELET 866 544 - 155 K/uL    MPV 10.4 8.9 - 12.9 FL    NRBC 0.0 0  WBC    ABSOLUTE NRBC 0.00 0.00 - 0.01 K/uL    NEUTROPHILS 81 (H) 32 - 75 %    LYMPHOCYTES 14 12 - 49 %    MONOCYTES 4 (L) 5 - 13 %    EOSINOPHILS 0 0 - 7 %    BASOPHILS 0 0 - 1 %    IMMATURE GRANULOCYTES 1 (H) 0.0 - 0.5 %    ABS. NEUTROPHILS 9.5 (H) 1.8 - 8.0 K/UL    ABS. LYMPHOCYTES 1.7 0.8 - 3.5 K/UL    ABS. MONOCYTES 0.5 0.0 - 1.0 K/UL    ABS. EOSINOPHILS 0.0 0.0 - 0.4 K/UL    ABS. BASOPHILS 0.1 0.0 - 0.1 K/UL    ABS. IMM. GRANS. 0.1 (H) 0.00 - 0.04 K/UL    DF AUTOMATED     LACTIC ACID    Collection Time: 04/03/18  9:04 AM   Result Value Ref Range    Lactic acid 3.0 (HH) 0.4 - 2.0 MMOL/L   BLOOD GAS, ARTERIAL    Collection Time: 04/03/18  9:30 AM   Result Value Ref Range    pH 7.18 (LL) 7.35 - 7.45      PCO2 57 (H) 35.0 - 45.0 mmHg    PO2 95 80 - 100 mmHg    O2 SAT 95 92 - 97 %    BICARBONATE 21 (L) 22 - 26 mmol/L    BASE DEFICIT 8.4 mmol/L    O2 METHOD BIPAP      FIO2 40 %    IPAP/PIP 16.0      EPAP/CPAP/PEEP 8.0      Sample source ARTERIAL      SITE LEFT RADIAL      TOSHA'S TEST YES      Critical value read back LOLITA Avila MD    METABOLIC PANEL, COMPREHENSIVE    Collection Time: 04/03/18  9:37 AM Result Value Ref Range    Sodium 130 (L) 136 - 145 mmol/L    Potassium 4.5 3.5 - 5.1 mmol/L    Chloride 92 (L) 97 - 108 mmol/L    CO2 23 21 - 32 mmol/L    Anion gap 15 5 - 15 mmol/L    Glucose 167 (H) 65 - 100 mg/dL    BUN 17 6 - 20 MG/DL    Creatinine 0.89 0.55 - 1.02 MG/DL    BUN/Creatinine ratio 19 12 - 20      GFR est AA >60 >60 ml/min/1.73m2    GFR est non-AA >60 >60 ml/min/1.73m2    Calcium 9.3 8.5 - 10.1 MG/DL    Bilirubin, total PENDING MG/DL    ALT (SGPT) PENDING U/L    AST (SGOT) PENDING U/L    Alk. phosphatase PENDING U/L    Protein, total PENDING g/dL    Albumin PENDING g/dL    Globulin PENDING g/dL    A-G Ratio PENDING         Xr Chest Port    Result Date: 4/3/2018  INDICATION:  chest pain with shortness of breath COMPARISON: 2/14/2018 FINDINGS: Single AP portable view of the chest obtained at 856 demonstrates a stable cardiomediastinal silhouette. There is interstitial lung disease. Patient is status post median sternotomy. There is no focal airspace disease. IMPRESSION: Interstitial pulmonary edema.

## 2018-04-03 NOTE — PROGRESS NOTES
1600 patient repeatedly having liquid stools, and  Mid epigastric pain. EKG unchanged.  MD Sergey Ortiz notified, stool studies sent, and chest, abd, pelvis CT ordered with contrast.

## 2018-04-03 NOTE — ED NOTES
Spoke with dr Zachary Johnson about hypotension via phone, plans for central line placement, Dr Destiney Lamar speaking with Dr Goldy Zapata at this time. Will continue to monitor patient.

## 2018-04-04 LAB
C DIFF TOX GENS STL QL NAA+PROBE: NEGATIVE
WBC #/AREA STL HPF: NORMAL /HPF (ref 0–4)

## 2018-04-04 PROCEDURE — 77010033678 HC OXYGEN DAILY

## 2018-04-04 PROCEDURE — 74011250637 HC RX REV CODE- 250/637: Performed by: INTERNAL MEDICINE

## 2018-04-04 PROCEDURE — 74011000250 HC RX REV CODE- 250: Performed by: INTERNAL MEDICINE

## 2018-04-04 PROCEDURE — 74011250636 HC RX REV CODE- 250/636: Performed by: INTERNAL MEDICINE

## 2018-04-04 PROCEDURE — 74011000258 HC RX REV CODE- 258: Performed by: INTERNAL MEDICINE

## 2018-04-04 PROCEDURE — 65610000006 HC RM INTENSIVE CARE

## 2018-04-04 PROCEDURE — 94640 AIRWAY INHALATION TREATMENT: CPT

## 2018-04-04 PROCEDURE — 77030038269 HC DRN EXT URIN PURWCK BARD -A

## 2018-04-04 RX ORDER — ACETAMINOPHEN 325 MG/1
650 TABLET ORAL
Status: DISCONTINUED | OUTPATIENT
Start: 2018-04-04 | End: 2018-04-09 | Stop reason: HOSPADM

## 2018-04-04 RX ORDER — CLOPIDOGREL BISULFATE 75 MG/1
75 TABLET ORAL DAILY
Status: DISCONTINUED | OUTPATIENT
Start: 2018-04-05 | End: 2018-04-09 | Stop reason: HOSPADM

## 2018-04-04 RX ORDER — IPRATROPIUM BROMIDE AND ALBUTEROL SULFATE 2.5; .5 MG/3ML; MG/3ML
3 SOLUTION RESPIRATORY (INHALATION)
Status: DISCONTINUED | OUTPATIENT
Start: 2018-04-04 | End: 2018-04-06

## 2018-04-04 RX ADMIN — ROSUVASTATIN CALCIUM 10 MG: 10 TABLET, FILM COATED ORAL at 21:04

## 2018-04-04 RX ADMIN — Medication 10 ML: at 14:00

## 2018-04-04 RX ADMIN — METHYLPREDNISOLONE SODIUM SUCCINATE 60 MG: 40 INJECTION, POWDER, FOR SOLUTION INTRAMUSCULAR; INTRAVENOUS at 05:47

## 2018-04-04 RX ADMIN — IPRATROPIUM BROMIDE AND ALBUTEROL SULFATE 3 ML: .5; 3 SOLUTION RESPIRATORY (INHALATION) at 15:26

## 2018-04-04 RX ADMIN — IPRATROPIUM BROMIDE AND ALBUTEROL SULFATE 3 ML: .5; 3 SOLUTION RESPIRATORY (INHALATION) at 20:04

## 2018-04-04 RX ADMIN — ENOXAPARIN SODIUM 60 MG: 60 INJECTION SUBCUTANEOUS at 23:48

## 2018-04-04 RX ADMIN — BUDESONIDE 500 MCG: 0.5 INHALANT RESPIRATORY (INHALATION) at 20:04

## 2018-04-04 RX ADMIN — CEFEPIME HYDROCHLORIDE 2 G: 2 INJECTION, POWDER, FOR SOLUTION INTRAVENOUS at 02:08

## 2018-04-04 RX ADMIN — ENOXAPARIN SODIUM 60 MG: 60 INJECTION SUBCUTANEOUS at 10:21

## 2018-04-04 RX ADMIN — METHYLPREDNISOLONE SODIUM SUCCINATE 60 MG: 40 INJECTION, POWDER, FOR SOLUTION INTRAMUSCULAR; INTRAVENOUS at 12:09

## 2018-04-04 RX ADMIN — METHYLPREDNISOLONE SODIUM SUCCINATE 60 MG: 40 INJECTION, POWDER, FOR SOLUTION INTRAMUSCULAR; INTRAVENOUS at 23:49

## 2018-04-04 RX ADMIN — BUDESONIDE 500 MCG: 0.5 INHALANT RESPIRATORY (INHALATION) at 08:40

## 2018-04-04 RX ADMIN — CEFEPIME HYDROCHLORIDE 2 G: 2 INJECTION, POWDER, FOR SOLUTION INTRAVENOUS at 14:29

## 2018-04-04 RX ADMIN — ARFORMOTEROL TARTRATE 15 MCG: 15 SOLUTION RESPIRATORY (INHALATION) at 08:40

## 2018-04-04 RX ADMIN — Medication 10 ML: at 05:48

## 2018-04-04 RX ADMIN — LEVOFLOXACIN 750 MG: 5 INJECTION, SOLUTION INTRAVENOUS at 10:20

## 2018-04-04 RX ADMIN — METHYLPREDNISOLONE SODIUM SUCCINATE 60 MG: 40 INJECTION, POWDER, FOR SOLUTION INTRAMUSCULAR; INTRAVENOUS at 17:29

## 2018-04-04 RX ADMIN — Medication 10 ML: at 22:00

## 2018-04-04 RX ADMIN — PANTOPRAZOLE SODIUM 40 MG: 40 TABLET, DELAYED RELEASE ORAL at 10:20

## 2018-04-04 RX ADMIN — IPRATROPIUM BROMIDE AND ALBUTEROL SULFATE 3 ML: .5; 3 SOLUTION RESPIRATORY (INHALATION) at 11:52

## 2018-04-04 RX ADMIN — ARFORMOTEROL TARTRATE 15 MCG: 15 SOLUTION RESPIRATORY (INHALATION) at 20:04

## 2018-04-04 RX ADMIN — ACETAMINOPHEN 650 MG: 325 TABLET ORAL at 05:45

## 2018-04-04 NOTE — PROGRESS NOTES
[facility]  [address] [facility], [address] [place] [address]  [phone]      Medical Progress Note      NAME: Cesar Wynne   :  1940  MRM:  [id number]    Date/Time: 2018  12:43 PM         Subjective:     Chief Complaint:  I'm feeling better    ROS:      SputumSOB/MOSLEY  Tolerating PT  Tolerating Diet          Objective:       Vitals:          Last 24hrs VS reviewed since prior progress note.  Most recent are:    Visit Vitals    /61    Pulse (!) 110    Temp 98.6 °F (37 °C)    Resp 28    Ht 5' 6\" (1.676 m)    Wt 59 kg (130 lb)    SpO2 95%    Breastfeeding No    BMI 20.98 kg/m2     SpO2 Readings from Last 6 Encounters:   18 95%   02/15/18 95%   18 96%   18 93%   01/15/18 97%   11/10/17 99%    O2 Flow Rate (L/min): 2 l/min     Intake/Output Summary (Last 24 hours) at 18 1243  Last data filed at 18 1200   Gross per 24 hour   Intake          1760.28 ml   Output             1150 ml   Net           610.28 ml          Exam:     Physical Exam:    Gen:  thin, in no acute distress  HEENT:  Pink conjunctivae, PERRL, hearing intact to voice, moist mucous membranes  Neck:  Supple, without masses, thyroid non-tender  Resp:  No accessory muscle use, clear breath sounds without wheezes rales or rhonchi  Card:  No murmurs, normal S1, S2 without thrills, bruits or peripheral edema  Abd:  Soft, non-tender, non-distended, normoactive bowel sounds are present  Musc:  No cyanosis or clubbing  Skin:  No rashes or ulcers, skin turgor is good  Neuro:  Cranial nerves 3-12 are grossly intact,  strength is 5/5 bilaterally and dorsi / plantarflexion is 5/5 bilaterally, follows commands appropriately  Psych:  Good insight, oriented to person, place and time, alert        Medications Reviewed: (see below)    Lab Data Reviewed: (see below)    ______________________________________________________________________    Medications:     Current Facility-Administered Medications   Medication Dose Route Frequency    acetaminophen (TYLENOL) tablet 650 mg  650 mg Oral Q6H PRN    albuterol-ipratropium (DUO-NEB) 2.5 MG-0.5 MG/3 ML  3 mL Nebulization Q4H RT    sodium chloride (NS) flush 5-10 mL  5-10 mL IntraVENous Q8H    sodium chloride (NS) flush 5-10 mL  5-10 mL IntraVENous PRN    budesonide (PULMICORT) 500 mcg/2 ml nebulizer suspension  500 mcg Nebulization BID RT    methylPREDNISolone (PF) (SOLU-MEDROL) injection 60 mg  60 mg IntraVENous Q6H    docusate sodium (COLACE) capsule 100 mg  100 mg Oral BID    levoFLOXacin (LEVAQUIN) 750 mg in D5W IVPB  750 mg IntraVENous Q24H    arformoterol (BROVANA) neb solution 15 mcg  15 mcg Nebulization BID RT    rosuvastatin (CRESTOR) tablet 10 mg  10 mg Oral QHS    aspirin chewable tablet 81 mg  81 mg Oral EVERY OTHER DAY    enoxaparin (LOVENOX) injection 60 mg  1 mg/kg SubCUTAneous Q12H    pantoprazole (PROTONIX) tablet 40 mg  40 mg Oral DAILY    PHENYLephrine (ELVIE-SYNEPHRINE) 30 mg in 0.9% sodium chloride 250 mL infusion   mcg/min IntraVENous TITRATE    cefepime (MAXIPIME) 2 g in 0.9% sodium chloride (MBP/ADV) 100 mL  2 g IntraVENous Q12H    lisinopril/hydroCHLOROthiazide (PRINZIDE/ZESTORETIC) 10/12.5   Oral DAILY            Lab Review:     Recent Labs      04/03/18   0854   WBC  11.7*   HGB  16.8*   HCT  52.2*   PLT  338     Recent Labs      04/03/18   0937   NA  130*   K  4.5   CL  92*   CO2  23   GLU  167*   BUN  17   CREA  0.89   CA  9.3   ALB  3.4*   SGOT  90*   ALT  53     No components found for: GLPOC         Assessment / Plan:     COPD exacerbation: improved and off BiPAP. Continuous albuterol. IV steroids. Levofloxacin. ICS/LABA. viral panel +for metapneumovirus. Check sputum culture.     NSTEMI / CAD: reviewed by cardiology. On full dose lovenox. Continue asa, no BB currently due to low BP on presentation. Order plavix     Pulmonary edema: suspect only mild if present.  Respiratory status improving without diuresis     HTN (hypertension): holding home lisinopril/HCTZ     GERD (gastroesophageal reflux disease): Resume PPI     Acute respiratory failure with hypoxia: 2/2 COPD Continue supplemental oxygen and NIPPV. Not on home oxygen at baseline     Hypotension: improving with supportive care and now off vasopressors.      Total time spent with patient: [address] discussed with: Patient, Family and Nursing Staff    Discussed:  Care Plan    Prophylaxis:  Lovenox    Disposition:  Home w/Family           ___________________________________________________    Attending Physician: Kristen Keating MD

## 2018-04-04 NOTE — PROGRESS NOTES
Cardiology Progress Note       ICU  NAME:  Mei Ricks   :   1940   MRN:   [id number]     Assessment/Plan:   1. Concern for ACS:  trop peak 3.73. On full dose lovenox. ECHOwith LV/RV dysfunction. Will need ischemia eval at some point when acute resp issues resolve. This can be done as OP with her previous cardiologist. Cont asa. Needs BB (coerg) , ACE-I when off pressors, BP stabilizes. Plavix now if ok with team. Will get records from [facility] re: cabg, previous ECHO. 2. COPD exac/ found to have Metapneumovirus: pulmonary managing. 3. Hypotension requiring pressor support: MAP goal > 65. Subjective:   Mei Ricks is a 78 y.o. female admitted for COPD exacerbation ([facility] [address] She has  a history of severe COPD (FEV1 in  0.92L, 42% predicted) who presents with several days of shortness of breath and cough that has gotten progressively worse. She was seen in clinic by JODY yesterday and started on doxycyline, but has continued to get worse. This morning she was hypoxic to the 80's. In route she was given decadron and nebs. Initially a code STEMI was called, but EKG on arrival was not consistent with a STEMI. She was very bronchospastic on admission and started on BiPAP and continuous albuterol. She has become progressively more hypotensive and getting a central line in the ED. She denies any chest pains, N or vomitig, diaphoresis, palpitation, syncope.             Cardiac ROS: Patient denies any exertional chest pain, dyspnea, palpitations, syncope, orthopnea, edema or paroxysmal nocturnal dyspnea. Previous Cardiac Eval:   ECHO 4/3/18 Left ventricle: Systolic function was normal. Ejection fraction was  estimated in the range of 40 % to 45 %. Cannot comment on regional wall  motion abnormalities. Doppler parameters were consistent with abnormal  left ventricular relaxation (grade 1 diastolic dysfunction). Right ventricle: The ventricle was mildly to moderately dilated. Systolic  function was reduced. Tricuspid valve: Pulmonary artery systolic pressure: 25 mmHg. Review of Systems: No nausea, indigestion, vomiting, pain, cough, sputum. No bleeding. Taking po. Up to chair. Objective:     Visit Vitals    /74    Pulse (!) 104    Temp 98.5 °F (36.9 °C)    Resp (!) 36    Ht 5' 6\" (1.676 m)    Wt 130 lb (59 kg)    SpO2 93%    Breastfeeding No    BMI 20.98 kg/m2    O2 Flow Rate (L/min): 2 l/min O2 Device: Nasal cannula    Temp (24hrs), Av.2 °F (36.8 °C), Min:97.5 °F (36.4 °C), Max:98.7 °F (37.1 °C)      701 -  190  In: 20 [I.V.:20]  Out: 50 [Urine:50]    1901 -  0700  In: 1140.3 [P.O.:120; I.V.:370.3]  Out: 950 [Urine:950]     TELE: ST     General: AAOx3 cooperative, no acute distress. HEENT: Atraumatic. Pink and moist.  Anicteric sclerae. Neck : Supple,   Lungs: Diminished anteriorly. SPO2 93% 2 liter nasal canula  Heart: tachycardic, Regular rhythm, no murmur. No JVD. No carotid bruits. Abdomen: Soft, non-distended, non-tender. + Bowel sounds. Extremities: No edema, no clubbing, no cyanosis. Neurologic: Grossly intact. Alert and oriented X 3. No acute neurological distress. Psych: Good insight. Not anxious or agitated. Care Plan discussed with:    Comments   Patient x    Family      RN x    Care Manager                    Consultant:  x intensivist        Data Review:     No lab exists for component: ITNL   Recent Labs      18   2217  18   1628  18   0937   TROIQ  3.00*  3.73*  3.08*     Recent Labs      18   0937  18   0854   NA  130*   --    K  4.5   --    CL  92*   --    CO2  23   --    BUN  17   --    CREA  0.89   --    GLU  167*   --    ALB  3.4*   --    WBC   --   11.7*   HGB   --   16.8*   HCT   --   52.2*   PLT   --   338     No results for input(s): INR, PTP, APTT in the last 72 hours.     No lab exists for component: INREXT    Medications reviewed  Current Facility-Administered Medications   Medication Dose Route Frequency    acetaminophen (TYLENOL) tablet 650 mg  650 mg Oral Q6H PRN    albuterol-ipratropium (DUO-NEB) 2.5 MG-0.5 MG/3 ML  3 mL Nebulization Q4H RT    sodium chloride (NS) flush 5-10 mL  5-10 mL IntraVENous Q8H    sodium chloride (NS) flush 5-10 mL  5-10 mL IntraVENous PRN    sodium chloride (NS) flush 5-10 mL  5-10 mL IntraVENous Q8H    sodium chloride (NS) flush 5-10 mL  5-10 mL IntraVENous PRN    budesonide (PULMICORT) 500 mcg/2 ml nebulizer suspension  500 mcg Nebulization BID RT    methylPREDNISolone (PF) (SOLU-MEDROL) injection 60 mg  60 mg IntraVENous Q6H    docusate sodium (COLACE) capsule 100 mg  100 mg Oral BID    levoFLOXacin (LEVAQUIN) 750 mg in D5W IVPB  750 mg IntraVENous Q24H    arformoterol (BROVANA) neb solution 15 mcg  15 mcg Nebulization BID RT    rosuvastatin (CRESTOR) tablet 10 mg  10 mg Oral QHS    aspirin chewable tablet 81 mg  81 mg Oral EVERY OTHER DAY    enoxaparin (LOVENOX) injection 60 mg  1 mg/kg SubCUTAneous Q12H    pantoprazole (PROTONIX) tablet 40 mg  40 mg Oral DAILY    PHENYLephrine (ELVIE-SYNEPHRINE) 30 mg in 0.9% sodium chloride 250 mL infusion   mcg/min IntraVENous TITRATE    sodium chloride (NS) flush 5-10 mL  5-10 mL IntraVENous PRN    cefepime (MAXIPIME) 2 g in 0.9% sodium chloride (MBP/ADV) 100 mL  2 g IntraVENous Q12H    lisinopril/hydroCHLOROthiazide (PRINZIDE/ZESTORETIC) 10/12.5   Oral DAILY         Walker Lombardi NP   I agree with all the above assessment and recommendations.

## 2018-04-04 NOTE — PROGRESS NOTES
1915 Bedside and Verbal shift change report received from JAMES Vu (offgoing nurse) by Sadiq Woodard (oncoming nurse). Report included the following information SBAR, Kardex and Recent Results. Pt resting in bed. Voices no complaints. 1950 Pt incontinent small brown/green loose stool. Incontinence care and jones care provided. Pt denies pain. 2120 Dr. Zamorano Unicoi notified of respiratory panel positive for Metapneumovirus. No new orders received. 2300 Pt sleeping. No distress. 7859 Tylenol given for c/o headache.  0700 Pt assisted up to chair. MOSLEY noted. Gait steady. 0715 Bedside and Verbal shift change report given to [facility] (oncoming nurse) by Sadiq Woodard (offgoing nurse). Report included the following information SBAR, Kardex, Intake/Output and Recent Results.

## 2018-04-04 NOTE — PROGRESS NOTES
Bedside shift change report given to Cecelia Lopez RN (oncoming nurse) by Leland Preciado RN (offgoing nurse). Report included the following information SBAR, Kardex, ED Summary, Intake/Output, MAR and Recent Results. SHIFT SUMMARY:    0730 Initial Shift  Assessment performed           Mental Status: Oriented x4           Respiratory: 2L NC           Cardiac: NSR           GI/: Dewey           IV Drips: Davis-synephrine @20mcg/min  0807 Patients sats 95% on 2L NC. Turned patients oxygen off will monitor closely. Bp stable at time. Decreased Davis-synephrine to 15mcg/min. 0830 Patients oxygen dropped to 88%. Placed patient back on 2L NC.  1057 Stopped Davis-synephrine. Patients BP has been stable. 1342 Patient status downgraded to stepdown. 1600 Removed Dewey catheter. 1845 Patient up to the toilet to void and was successful. Sats dropped to 87% while on 2L NC. Once back in chair recovered quickly. Bedside shift change report given to Harika Rucker RN (oncoming nurse) by Cecelia Lopez RN (offgoing nurse). Report included the following information SBAR, Kardex, ED Summary, Intake/Output, MAR and Recent Results.

## 2018-04-04 NOTE — PROGRESS NOTES
[facility]     Name: Jazmin Gusman MRN: [id number]   : 1940 Hospital: [facility]   Date: 2018        Impression Plan   1. Acute hypercapnic and hypoxic respiratory failure  2. COPD with exacerbation; metapneumovirus positive  3. Hypotension  4. Abnormal chest imaging, CXR concerning for pulmonary edema  5. GERD  6. Diarrhea, improving         · Wean O2 as tolerated, goal sats >90%  · Can use BiPAP prn   · D/C continuous nebs, will schedule duonebs  · Brovana/Pulmicort  · Levofloxacin  · IV steroids  · Pressors for goal MAP >65  · Follow-up cultures  · Cardiology following  · Lovenox  · Protonix  · Regular diet         Pt is critically ill and at risk of decompensation hypotension requiring pressors. Critical care time spent with pt exclusive of procedures was 30 minutes    Radiology  ( personally reviewed) CXR with increased bilateral interstitial markings concerning for pulmonary edema  TTE report reviewed:EF 98-39%, grade 1 diastolic dysfunction, PASP 25   ABG No results for input(s): PHI, PO2I, PCO2I in the last 72 hours. Subjective     Has been off of BiPAP since yesterday evening and doing well, satting in the mid 90's on 3L NC. Remains on lamar, currently at 15 with a MAP of 85. Had multiple episodes of diarrhea yesterday afternoon, last was overnight. Review of Systems:  A comprehensive review of systems was negative except for that written in the HPI. Past Medical History:   Diagnosis Date    Asthma 10/11/2012    CAD (coronary artery disease) 4/3/2018    GERD (gastroesophageal reflux disease) 2009    HTN 2009    Menopausal state 2009    Serum lipid, elevated 2009      Past Surgical History:   Procedure Laterality Date    COLONOSCOPY N/A 2016    COLONOSCOPY performed by Adalgisa Hemphill MD at [address] [place] [address]        Prior to Admission medications    Medication Sig Start Date End Date Taking? Authorizing Provider   doxycycline (MONODOX) 100 mg capsule Take 100 mg by mouth two (2) times a day. Yes Leigh Bocanegra MD   predniSONE (DELTASONE) 20 mg tablet Take 20 mg by mouth. 3 every day for 4 days, 2 every day for 5 days, 1 every day for 5 days, 1/2 every day for 5 days   Yes Historical Provider   rosuvastatin (CRESTOR) 20 mg tablet Take 20 mg by mouth nightly. Yes Historical Provider   omeprazole (PRILOSEC) 20 mg capsule Take 1 Cap by mouth daily for 90 days. This to replace script for tablets 3/10/18 6/8/18 Yes Yazmin Dennis MD   lisinopril-hydroCHLOROthiazide (PRINZIDE, ZESTORETIC) 10-12.5 mg per tablet Take 1 Tab by mouth daily for 90 days. 1/11/18 4/11/18 Yes Yazmin Dennis MD   KRILL OIL PO Take 1 Cap by mouth daily. Yes Historical Provider   budesonide-formoterol (SYMBICORT) 160-4.5 mcg/actuation HFA inhaler Take 2 Puffs by inhalation two (2) times a day. 11/9/15  Yes Yazmin Dennis MD   magnesium 250 mg tab Take 500 mg by mouth daily. Yes Historical Provider   aclidinium bromide (TUDORZA PRESSAIR) 400 mcg/actuation inhaler Take 1 Puff by inhalation daily. Yes Historical Provider   cholecalciferol (VITAMIN D3) 1,000 unit tablet Take 1,000 Units by mouth daily. Yes Historical Provider   albuterol (PROVENTIL HFA, VENTOLIN HFA) 90 mcg/actuation inhaler Take 2 Puffs by inhalation every four (4) hours as needed for Wheezing. 11/7/12  Yes Yazmin Dennis MD   CALCIUM CARBONATE/VITAMIN D3 (CALCIUM + D PO) Take 1 Cap by mouth daily. Yes Historical Provider   ASPIRIN 81 mg chewable tablet Take 81 mg by mouth daily.    Yes Historical Provider     Current Facility-Administered Medications   Medication Dose Route Frequency    sodium chloride (NS) flush 5-10 mL  5-10 mL IntraVENous Q8H    albuterol (PROVENTIL VENTOLIN) nebulizer solution 15 mg  15 mg Nebulization CONTINUOUS    sodium chloride (NS) flush 5-10 mL  5-10 mL IntraVENous Q8H    budesonide (PULMICORT) 500 mcg/2 ml nebulizer suspension  500 mcg Nebulization BID RT    methylPREDNISolone (PF) (SOLU-MEDROL) injection 60 mg  60 mg IntraVENous Q6H    docusate sodium (COLACE) capsule 100 mg  100 mg Oral BID    levoFLOXacin (LEVAQUIN) 750 mg in D5W IVPB  750 mg IntraVENous Q24H    arformoterol (BROVANA) neb solution 15 mcg  15 mcg Nebulization BID RT    rosuvastatin (CRESTOR) tablet 10 mg  10 mg Oral QHS    aspirin chewable tablet 81 mg  81 mg Oral EVERY OTHER DAY    enoxaparin (LOVENOX) injection 60 mg  1 mg/kg SubCUTAneous Q12H    pantoprazole (PROTONIX) tablet 40 mg  40 mg Oral DAILY    PHENYLephrine (ELVIE-SYNEPHRINE) 30 mg in 0.9% sodium chloride 250 mL infusion   mcg/min IntraVENous TITRATE    cefepime (MAXIPIME) 2 g in 0.9% sodium chloride (MBP/ADV) 100 mL  2 g IntraVENous Q12H    lisinopril/hydroCHLOROthiazide (PRINZIDE/ZESTORETIC) 10/12.5   Oral DAILY     Allergies   Allergen Reactions    Augmentin [Amoxicillin-Pot Clavulanate] Nausea Only      Social History   Substance Use Topics    Smoking status: Former Smoker     Packs/day: 1.00     Years: 20.00     Types: Cigarettes     Quit date: 1/1/1980    Smokeless tobacco: Never Used    Alcohol use 0.6 oz/week     1 Glasses of wine per week      Comment: daily glass of wine      Family History   Problem Relation Age of Onset    Heart Attack Mother     Hypertension Mother     High Cholesterol Mother     Hypertension Father     Hypertension Brother           Laboratory: I have personally reviewed the critical care flowsheet and labs.      Recent Labs      04/03/18   0854   WBC  11.7*   HGB  16.8*   HCT  52.2*   PLT  338     Recent Labs      04/03/18   0937   NA  130*   K  4.5   CL  92*   CO2  23   GLU  167*   BUN  17   CREA  0.89   CA  9.3   ALB  3.4*   SGOT  90*   ALT  53       Objective:     Mode Rate Tidal Volume Pressure FiO2 PEEP            100 %       Vital Signs:     TMAX(24)      Intake/Output:   Last shift:      Ventilator Volumes  Vt Spont (ml): 693 ml  Ve Observed (l/min): 15.7 l/min  Last 3 shifts: 04/04 0701 - 04/04 1900  In: 20 [I.V.:20]  Out: 50 [Urine:50]RRIOLAST3    Intake/Output Summary (Last 24 hours) at 04/04/18 0905  Last data filed at 04/04/18 0800   Gross per 24 hour   Intake          1160.28 ml   Output             1000 ml   Net           160.28 ml     EXAM:   GENERAL: well developed and in no distress, HEENT:  PERRL, EOMI, no alar flaring or epistaxis, oral mucosa moist without cyanosis, NECK:  no jugular vein distention, no retractions, no thyromegaly or masses, LUNGS: decreased breath sounds billaterally but CTAB , HEART:  Regular rate and rhythm with no MGR; no edema is present, ABDOMEN:  soft with no tenderness, bowel sounds present, EXTREMITIES:  warm with no cyanosis, SKIN:  no jaundice or ecchymosis and NEUROLOGIC:  alert and oriented, grossly non-focal    Radha Harrington MD  [facility]

## 2018-04-04 NOTE — PROGRESS NOTES
4/4/2018  3:04 PM  EMR reviewed, Pt is continuing to require medical management, CM will follow for d/c needs, if no services indicated Pt would benefit from H2H/COPD visit.   Melba Carlson

## 2018-04-04 NOTE — PROGRESS NOTES
Problem: Falls - Risk of  Goal: *Absence of Falls  Document Marc Fall Risk and appropriate interventions in the flowsheet.    Outcome: Progressing Towards Goal  Fall Risk Interventions:  Mobility Interventions: Bed/chair exit alarm, Patient to call before getting OOB         Medication Interventions: Bed/chair exit alarm, Patient to call before getting OOB    Elimination Interventions: Bed/chair exit alarm, Call light in reach

## 2018-04-05 LAB
ANION GAP SERPL CALC-SCNC: 4 MMOL/L (ref 5–15)
BUN SERPL-MCNC: 26 MG/DL (ref 6–20)
BUN/CREAT SERPL: 36 (ref 12–20)
CALCIUM SERPL-MCNC: 8.4 MG/DL (ref 8.5–10.1)
CHLORIDE SERPL-SCNC: 106 MMOL/L (ref 97–108)
CO2 SERPL-SCNC: 27 MMOL/L (ref 21–32)
CREAT SERPL-MCNC: 0.72 MG/DL (ref 0.55–1.02)
ERYTHROCYTE [DISTWIDTH] IN BLOOD BY AUTOMATED COUNT: 15.1 % (ref 11.5–14.5)
GLUCOSE SERPL-MCNC: 163 MG/DL (ref 65–100)
HCT VFR BLD AUTO: 37.4 % (ref 35–47)
HGB BLD-MCNC: 11.9 G/DL (ref 11.5–16)
MAGNESIUM SERPL-MCNC: 2 MG/DL (ref 1.6–2.4)
MCH RBC QN AUTO: 28.7 PG (ref 26–34)
MCHC RBC AUTO-ENTMCNC: 31.8 G/DL (ref 30–36.5)
MCV RBC AUTO: 90.3 FL (ref 80–99)
NRBC # BLD: 0 K/UL (ref 0–0.01)
NRBC BLD-RTO: 0 PER 100 WBC
PHOSPHATE SERPL-MCNC: 2.5 MG/DL (ref 2.6–4.7)
PLATELET # BLD AUTO: 232 K/UL (ref 150–400)
PMV BLD AUTO: 10.6 FL (ref 8.9–12.9)
POTASSIUM SERPL-SCNC: 4.3 MMOL/L (ref 3.5–5.1)
RBC # BLD AUTO: 4.14 M/UL (ref 3.8–5.2)
SODIUM SERPL-SCNC: 137 MMOL/L (ref 136–145)
WBC # BLD AUTO: 15.4 K/UL (ref 3.6–11)

## 2018-04-05 PROCEDURE — 36556 INSERT NON-TUNNEL CV CATH: CPT

## 2018-04-05 PROCEDURE — 74011250636 HC RX REV CODE- 250/636: Performed by: INTERNAL MEDICINE

## 2018-04-05 PROCEDURE — 74011250637 HC RX REV CODE- 250/637: Performed by: INTERNAL MEDICINE

## 2018-04-05 PROCEDURE — 77030038269 HC DRN EXT URIN PURWCK BARD -A

## 2018-04-05 PROCEDURE — 77010033678 HC OXYGEN DAILY

## 2018-04-05 PROCEDURE — 74011000258 HC RX REV CODE- 258: Performed by: INTERNAL MEDICINE

## 2018-04-05 PROCEDURE — 84100 ASSAY OF PHOSPHORUS: CPT | Performed by: INTERNAL MEDICINE

## 2018-04-05 PROCEDURE — 74011000250 HC RX REV CODE- 250: Performed by: INTERNAL MEDICINE

## 2018-04-05 PROCEDURE — 94640 AIRWAY INHALATION TREATMENT: CPT

## 2018-04-05 PROCEDURE — 36415 COLL VENOUS BLD VENIPUNCTURE: CPT | Performed by: INTERNAL MEDICINE

## 2018-04-05 PROCEDURE — C1751 CATH, INF, PER/CENT/MIDLINE: HCPCS

## 2018-04-05 PROCEDURE — 65660000000 HC RM CCU STEPDOWN

## 2018-04-05 PROCEDURE — 85027 COMPLETE CBC AUTOMATED: CPT | Performed by: INTERNAL MEDICINE

## 2018-04-05 PROCEDURE — 83735 ASSAY OF MAGNESIUM: CPT | Performed by: INTERNAL MEDICINE

## 2018-04-05 PROCEDURE — 80048 BASIC METABOLIC PNL TOTAL CA: CPT | Performed by: INTERNAL MEDICINE

## 2018-04-05 PROCEDURE — 74011250637 HC RX REV CODE- 250/637: Performed by: NURSE PRACTITIONER

## 2018-04-05 RX ORDER — ROSUVASTATIN CALCIUM 10 MG/1
20 TABLET, COATED ORAL
Status: DISCONTINUED | OUTPATIENT
Start: 2018-04-05 | End: 2018-04-09 | Stop reason: HOSPADM

## 2018-04-05 RX ORDER — ENOXAPARIN SODIUM 100 MG/ML
40 INJECTION SUBCUTANEOUS EVERY 24 HOURS
Status: DISCONTINUED | OUTPATIENT
Start: 2018-04-06 | End: 2018-04-09 | Stop reason: HOSPADM

## 2018-04-05 RX ADMIN — DOCUSATE SODIUM 100 MG: 100 CAPSULE, LIQUID FILLED ORAL at 17:06

## 2018-04-05 RX ADMIN — IPRATROPIUM BROMIDE AND ALBUTEROL SULFATE 3 ML: .5; 3 SOLUTION RESPIRATORY (INHALATION) at 08:40

## 2018-04-05 RX ADMIN — CLOPIDOGREL BISULFATE 75 MG: 75 TABLET ORAL at 09:29

## 2018-04-05 RX ADMIN — IPRATROPIUM BROMIDE AND ALBUTEROL SULFATE 3 ML: .5; 3 SOLUTION RESPIRATORY (INHALATION) at 11:35

## 2018-04-05 RX ADMIN — LISINOPRIL: 5 TABLET ORAL at 09:28

## 2018-04-05 RX ADMIN — Medication 10 ML: at 21:23

## 2018-04-05 RX ADMIN — PANTOPRAZOLE SODIUM 40 MG: 40 TABLET, DELAYED RELEASE ORAL at 09:28

## 2018-04-05 RX ADMIN — CEFEPIME HYDROCHLORIDE 2 G: 2 INJECTION, POWDER, FOR SOLUTION INTRAVENOUS at 01:53

## 2018-04-05 RX ADMIN — BUDESONIDE 500 MCG: 0.5 INHALANT RESPIRATORY (INHALATION) at 08:40

## 2018-04-05 RX ADMIN — ASPIRIN 81 MG 81 MG: 81 TABLET ORAL at 09:30

## 2018-04-05 RX ADMIN — ROSUVASTATIN CALCIUM 20 MG: 10 TABLET, FILM COATED ORAL at 21:22

## 2018-04-05 RX ADMIN — BUDESONIDE 500 MCG: 0.5 INHALANT RESPIRATORY (INHALATION) at 20:42

## 2018-04-05 RX ADMIN — IPRATROPIUM BROMIDE AND ALBUTEROL SULFATE 3 ML: .5; 3 SOLUTION RESPIRATORY (INHALATION) at 20:42

## 2018-04-05 RX ADMIN — ENOXAPARIN SODIUM 60 MG: 60 INJECTION SUBCUTANEOUS at 10:59

## 2018-04-05 RX ADMIN — IPRATROPIUM BROMIDE AND ALBUTEROL SULFATE 3 ML: .5; 3 SOLUTION RESPIRATORY (INHALATION) at 15:49

## 2018-04-05 RX ADMIN — Medication 10 ML: at 14:14

## 2018-04-05 RX ADMIN — METHYLPREDNISOLONE SODIUM SUCCINATE 60 MG: 40 INJECTION, POWDER, FOR SOLUTION INTRAMUSCULAR; INTRAVENOUS at 14:13

## 2018-04-05 RX ADMIN — Medication 10 ML: at 06:00

## 2018-04-05 RX ADMIN — DOCUSATE SODIUM 100 MG: 100 CAPSULE, LIQUID FILLED ORAL at 09:28

## 2018-04-05 RX ADMIN — METHYLPREDNISOLONE SODIUM SUCCINATE 60 MG: 40 INJECTION, POWDER, FOR SOLUTION INTRAMUSCULAR; INTRAVENOUS at 06:07

## 2018-04-05 RX ADMIN — METHYLPREDNISOLONE SODIUM SUCCINATE 60 MG: 40 INJECTION, POWDER, FOR SOLUTION INTRAMUSCULAR; INTRAVENOUS at 21:22

## 2018-04-05 RX ADMIN — LEVOFLOXACIN 750 MG: 5 INJECTION, SOLUTION INTRAVENOUS at 09:30

## 2018-04-05 NOTE — PROGRESS NOTES
1030: Spoke w/ MD Ram. MD stated OK to remove TLC. 1055: Notified pt that TLC is to be removed. Pt has visitors & requested to have it pulled later on. Pt stated she will notify this RN when ready. 1950: Bedside and Verbal shift change report given to Clinton Kenny (oncoming nurse) by Domonique RAMIREZ (offgoing nurse). Report included the following information SBAR, Kardex, Intake/Output, Recent Results and Cardiac Rhythm ST w/ PVCs.

## 2018-04-05 NOTE — PROGRESS NOTES
Follow up visit with Miss Angel Martinez request by [facility] consult request.  Mr. Angel Martinez was also in the room. They have been together for 54 years. They both appeared to be in pretty good moods. They attend an [facility] in the area. Provided supportive spiritual presence and assurance of prayer.   Visited by: Katlin Merida [phone] [facility] [address] [facility] ([id number])

## 2018-04-05 NOTE — PROGRESS NOTES
PT ad OT have been consulted. Based upon what therapy determibnes ,pt will either need a [facility] COPD /MI visit or home health with SN and therapy. Referral sent to [facility] as pt is a [facility] pt. Will follow for therapy recommendations.     Kenji Davidson

## 2018-04-05 NOTE — PROGRESS NOTES
Cardiology Progress Note       ICU  NAME:  Watson Corona   :   1940   MRN:   [id number]     Assessment/Plan:   1. Concern for ACS:  trop peak 3.73. ECHO with LV/RV dysfunction. Will need ischemia eval stress vs cath at some point when acute resp issues resolve. This can be done as OP with her previous cardiologist, Dr Babita Chavarria. She prefers to cont care there. Cont asa. Had previous reaction to BB (fatigue, weakness) post CABG, therefore it was stopped. OK to resume ACE-I today. Start Plavix. She would prefer to speak to Dr Babita Chavarria before BB initiation. 2. CAD s/p 3 V CABG 3/21/17. Lipid panel recently done as OP. Per pt her crestor dose is 20 mg QHS. I changed this today. 3. COPD exac/ found to have Metapneumovirus: pulmonary managing. 4. Hypotension requiring pressor support: Davis off times 24 hours. Resuming BP meds. Ok to tele  Follow-up Information     Follow up With Details Comments Contact Info    Octaviano Blanco MD On 2018 11 am [address]  [address] [facility] 181 [phone]                   Subjective:   Watson Corona is a 78 y.o. female admitted for COPD exacerbation ([facility] [address] Cardiac ROS: Patient denies any exertional chest pain, dyspnea, palpitations, syncope, orthopnea, edema or paroxysmal nocturnal dyspnea. Previous Cardiac Eval:   ECHO 3/21/17 LVEF 65-70% no WMA, no effusion    ECHO 4/3/18 Left ventricle: Systolic function was normal. Ejection fraction was  estimated in the range of 40 % to 45 %. Cannot comment on regional wall  motion abnormalities. Doppler parameters were consistent with abnormal  left ventricular relaxation (grade 1 diastolic dysfunction). Right ventricle: The ventricle was mildly to moderately dilated. Systolic  function was reduced. Tricuspid valve: Pulmonary artery systolic pressure: 25 mmHg.     Cardiac cath: 3/21/17: mildly calcified L main dse, high grade calcified prox LAD involving ostium of high diagonal branch confirmed by IVUS, small cflex with mild dse, large RCA with high grade ostial dse and 2 high grade lesions in the mid vessel with good targets posterolateral and PDA. > balloon pump > CABG.     3/21/17 CABG: 3 V CAD LIMA > LAD, SVG > diagonal, SVG to posterior descending. Review of Systems: No nausea, indigestion, vomiting, pain, cough, sputum. No bleeding. Taking po. Up to chair. Objective:     Visit Vitals    /83 (BP 1 Location: Left arm, BP Patient Position: Post activity)    Pulse (!) 110    Temp 97.7 °F (36.5 °C)    Resp 22    Ht 5' 6\" (1.676 m)    Wt 128 lb 1.4 oz (58.1 kg)    SpO2 94%    Breastfeeding No    BMI 20.67 kg/m2    O2 Flow Rate (L/min): 2 l/min O2 Device: Nasal cannula    Temp (24hrs), Av.1 °F (36.7 °C), Min:97.6 °F (36.4 °C), Max:98.6 °F (37 °C)           1901 -  0700  In: 1043.5 [P.O.:720; I.V.:323.5]  Out: 1150 [Urine:1150]     TELE: SR/ST    General: AAOx3 cooperative, no acute distress. HEENT: Atraumatic. Pink and moist.  Anicteric sclerae. Neck : Supple, central line L neck   Lungs: Rales L base. SPO2 94 % 2 liter nasal canula  Heart: tachycardic, Regular rhythm, no murmur. No JVD. No carotid bruits. Abdomen: Soft, non-distended, non-tender. + Bowel sounds. Extremities: No edema. Neurologic: Grossly intact. Alert and oriented X 3. No acute neurological distress. Psych: Good insight. Anxious.        Care Plan discussed with:    Comments   Patient x    Family      RN x    Care Manager                    Consultant:  x intensivist        Data Review:     No lab exists for component: ITNL   Recent Labs      18   2217  18   1628  18   0937   TROIQ  3.00*  3.73*  3.08*     Recent Labs      18   0426  18   0937  18   0854   NA  137  130*   --    K  4.3  4.5   --    CL  106  92*   --    CO2  27  23   --    BUN  26*  17   --    CREA  0.72  0.89   --    GLU  163*  167*   --    PHOS  2.5* --    --    MG  2.0   --    --    ALB   --   3.4*   --    WBC  15.4*   --   11.7*   HGB  11.9   --   16.8*   HCT  37.4   --   52.2*   PLT  232   --   338     No results for input(s): INR, PTP, APTT in the last 72 hours. No lab exists for component: INREXT, INREXT    Medications reviewed  Current Facility-Administered Medications   Medication Dose Route Frequency    acetaminophen (TYLENOL) tablet 650 mg  650 mg Oral Q6H PRN    albuterol-ipratropium (DUO-NEB) 2.5 MG-0.5 MG/3 ML  3 mL Nebulization Q4H RT    clopidogrel (PLAVIX) tablet 75 mg  75 mg Oral DAILY    sodium chloride (NS) flush 5-10 mL  5-10 mL IntraVENous Q8H    sodium chloride (NS) flush 5-10 mL  5-10 mL IntraVENous PRN    budesonide (PULMICORT) 500 mcg/2 ml nebulizer suspension  500 mcg Nebulization BID RT    methylPREDNISolone (PF) (SOLU-MEDROL) injection 60 mg  60 mg IntraVENous Q6H    docusate sodium (COLACE) capsule 100 mg  100 mg Oral BID    levoFLOXacin (LEVAQUIN) 750 mg in D5W IVPB  750 mg IntraVENous Q24H    arformoterol (BROVANA) neb solution 15 mcg  15 mcg Nebulization BID RT    rosuvastatin (CRESTOR) tablet 10 mg  10 mg Oral QHS    aspirin chewable tablet 81 mg  81 mg Oral EVERY OTHER DAY    enoxaparin (LOVENOX) injection 60 mg  1 mg/kg SubCUTAneous Q12H    pantoprazole (PROTONIX) tablet 40 mg  40 mg Oral DAILY    PHENYLephrine (ELVIE-SYNEPHRINE) 30 mg in 0.9% sodium chloride 250 mL infusion   mcg/min IntraVENous TITRATE    cefepime (MAXIPIME) 2 g in 0.9% sodium chloride (MBP/ADV) 100 mL  2 g IntraVENous Q12H    lisinopril/hydroCHLOROthiazide (PRINZIDE/ZESTORETIC) 10/12.5   Oral DAILY         Ann Adamson NP     I agree with the all above.

## 2018-04-05 NOTE — PROGRESS NOTES
1900: Bedside and Verbal shift change report given to Phuc Munoz RN (oncoming nurse) by Leonardo Bueno RN (offgoing nurse). Report included the following information SBAR, Kardex, ED Summary, Procedure Summary, Intake/Output, MAR, Recent Results and Cardiac Rhythm Sinus Tachycardia. 1930: Initial assessments performed, patient resting in recliner, eating dinner. Alert and oriented. 2L NC. Sinus Tachycardia. Assisted back to bed, dyspneic upon exertion. 2230: Patient with a lot of questions regarding cardiology rounds and how to get her primary cardiologist informed of her current situation. Informed patient that currently awaiting records from [address] [place] [address] to decide further plan of care and acute respiratory issues are being treated here until then. Encouraged patient to write down questions she has for cardiologist and review them tomorrow during rounds. Patient cooperative and agreeable to this. 0000: Re-assessments performed, patient was assisted to bedside commode. Voided. Dyspneic upon exertion. Purewick recommended, patient agreeable. Purewick in place. No other changes at this time, will continue to monitor. 0230: Patient expresses that she is tired, would like to get as much rest as possible tonight without disruption. Expresses confusion with plan of care and frustration with her current situation as she is very active at baseline and does not want to go home with oxygen. Patient concerned about how she will manage with going to the gym with portable oxygen if she requires it upon discharge. RN explained to patient regarding current focus being on stabilizing acute respiratory distress symptoms and home oxygen needs will be re-assessed on a daily basis leading up to discharge. Encouraged follow-up questions with physician rounds in AM. Patient verbalizes understanding. 0400: Re-assessments performed. No changes at this time. AM labs drawn and sent.    0700: Bedside and Verbal shift change report given to [employer], RN (oncoming nurse) by Dee MonJAMES soliz (offgoing nurse). Report included the following information SBAR, Kardex, ED Summary, Procedure Summary, Intake/Output, MAR, Recent Results and Cardiac Rhythm NSR.

## 2018-04-05 NOTE — PROGRESS NOTES
[facility]     Name: Patricio Cortez MRN: [id number]   : 1940 Hospital: [address]   Date: 2018        Impression Plan   1. Acute hypercapnic and hypoxic respiratory failure  2. COPD with exacerbation; metapneumovirus positive  3. Hypotension  4. Abnormal chest imaging, CXR concerning for pulmonary edema  5. GERD  6. Diarrhea, improving         · Wean O2 as tolerated, goal sats >90%  · Can use BiPAP prn, has not needed in >36 hours   · Duonebs  · Brovana/Pulmicort  · Levofloxacin, will plan on a 5 day course  · IV steroids, will wean slightly today  · Pressors for goal MAP >65  · Follow-up cultures  · Cardiology following, will need additional workup as an outpatient  · Lovenox  · Protonix  · Regular diet    Stable for transfer out of the ICU         Radiology  ( personally reviewed) CXR with increased bilateral interstitial markings concerning for pulmonary edema  TTE report reviewed:EF 14-90%, grade 1 diastolic dysfunction, PASP 25   ABG No results for input(s): PHI, PO2I, PCO2I in the last 72 hours. Subjective     Remains off of BiPAP. Off of pressors for >24 hours. Feeling more short of breath today. Appears dyspneic with conversation. Frustrated that she is not likely to be discharge tomorrow. Review of Systems:  A comprehensive review of systems was negative except for that written in the HPI. Past Medical History:   Diagnosis Date    Asthma 10/11/2012    CAD (coronary artery disease) 4/3/2018    GERD (gastroesophageal reflux disease) 2009    HTN 2009    Menopausal state 2009    Serum lipid, elevated 2009      Past Surgical History:   Procedure Laterality Date    COLONOSCOPY N/A 2016    COLONOSCOPY performed by Aguilar Miranda MD at [address] [place] [address]        Prior to Admission medications    Medication Sig Start Date End Date Taking?  Authorizing Provider   doxycycline (MONODOX) 100 mg capsule Take 100 mg by mouth two (2) times a day. Yes Leigh Bocanegra MD   predniSONE (DELTASONE) 20 mg tablet Take 20 mg by mouth. 3 every day for 4 days, 2 every day for 5 days, 1 every day for 5 days, 1/2 every day for 5 days   Yes Historical Provider   rosuvastatin (CRESTOR) 20 mg tablet Take 20 mg by mouth nightly. Yes Historical Provider   omeprazole (PRILOSEC) 20 mg capsule Take 1 Cap by mouth daily for 90 days. This to replace script for tablets 3/10/18 6/8/18 Yes Arpan Lyman MD   lisinopril-hydroCHLOROthiazide (PRINZIDE, ZESTORETIC) 10-12.5 mg per tablet Take 1 Tab by mouth daily for 90 days. 1/11/18 4/11/18 Yes Arpan Lyman MD   KRILL OIL PO Take 1 Cap by mouth daily. Yes Historical Provider   budesonide-formoterol (SYMBICORT) 160-4.5 mcg/actuation HFA inhaler Take 2 Puffs by inhalation two (2) times a day. 11/9/15  Yes Arpan Lyman MD   magnesium 250 mg tab Take 500 mg by mouth daily. Yes Historical Provider   aclidinium bromide (TUDORZA PRESSAIR) 400 mcg/actuation inhaler Take 1 Puff by inhalation daily. Yes Historical Provider   cholecalciferol (VITAMIN D3) 1,000 unit tablet Take 1,000 Units by mouth daily. Yes Historical Provider   albuterol (PROVENTIL HFA, VENTOLIN HFA) 90 mcg/actuation inhaler Take 2 Puffs by inhalation every four (4) hours as needed for Wheezing. 11/7/12  Yes Arpan Lyman MD   CALCIUM CARBONATE/VITAMIN D3 (CALCIUM + D PO) Take 1 Cap by mouth daily. Yes Historical Provider   ASPIRIN 81 mg chewable tablet Take 81 mg by mouth daily.    Yes Historical Provider     Current Facility-Administered Medications   Medication Dose Route Frequency    rosuvastatin (CRESTOR) tablet 20 mg  20 mg Oral QHS    albuterol-ipratropium (DUO-NEB) 2.5 MG-0.5 MG/3 ML  3 mL Nebulization Q4H RT    clopidogrel (PLAVIX) tablet 75 mg  75 mg Oral DAILY    sodium chloride (NS) flush 5-10 mL  5-10 mL IntraVENous Q8H    budesonide (PULMICORT) 500 mcg/2 ml nebulizer suspension  500 mcg Nebulization BID RT    methylPREDNISolone (PF) (SOLU-MEDROL) injection 60 mg  60 mg IntraVENous Q6H    docusate sodium (COLACE) capsule 100 mg  100 mg Oral BID    levoFLOXacin (LEVAQUIN) 750 mg in D5W IVPB  750 mg IntraVENous Q24H    arformoterol (BROVANA) neb solution 15 mcg  15 mcg Nebulization BID RT    aspirin chewable tablet 81 mg  81 mg Oral EVERY OTHER DAY    enoxaparin (LOVENOX) injection 60 mg  1 mg/kg SubCUTAneous Q12H    pantoprazole (PROTONIX) tablet 40 mg  40 mg Oral DAILY    cefepime (MAXIPIME) 2 g in 0.9% sodium chloride (MBP/ADV) 100 mL  2 g IntraVENous Q12H    lisinopril/hydroCHLOROthiazide (PRINZIDE/ZESTORETIC) 10/12.5   Oral DAILY     Allergies   Allergen Reactions    Augmentin [Amoxicillin-Pot Clavulanate] Nausea Only      Social History   Substance Use Topics    Smoking status: Former Smoker     Packs/day: 1.00     Years: 20.00     Types: Cigarettes     Quit date: 1/1/1980    Smokeless tobacco: Never Used    Alcohol use 0.6 oz/week     1 Glasses of wine per week      Comment: daily glass of wine      Family History   Problem Relation Age of Onset    Heart Attack Mother     Hypertension Mother     High Cholesterol Mother     Hypertension Father     Hypertension Brother           Laboratory: I have personally reviewed the critical care flowsheet and labs.      Recent Labs      04/05/18 0426 04/03/18   0854   WBC  15.4*  11.7*   HGB  11.9  16.8*   HCT  37.4  52.2*   PLT  232  338     Recent Labs      04/05/18 0426  04/03/18   0937   NA  137  130*   K  4.3  4.5   CL  106  92*   CO2  27  23   GLU  163*  167*   BUN  26*  17   CREA  0.72  0.89   CA  8.4*  9.3   MG  2.0   --    PHOS  2.5*   --    ALB   --   3.4*   SGOT   --   90*   ALT   --   53       Objective:     Mode Rate Tidal Volume Pressure FiO2 PEEP            100 %       Vital Signs:     TMAX(24)      Intake/Output:   Last shift:      Ventilator Volumes  Vt Spont (ml): 693 ml  Ve Observed (l/min): 15.7 l/min  Last 3 shifts: 04/05 0701 - 04/05 1900  In: 120 [P.O.:120]  Out: - RRIOLAST3    Intake/Output Summary (Last 24 hours) at 04/05/18 1031  Last data filed at 04/05/18 0932   Gross per 24 hour   Intake              720 ml   Output              150 ml   Net              570 ml     EXAM:   GENERAL: well developed and in no distress, HEENT:  PERRL, EOMI, no alar flaring or epistaxis, oral mucosa moist without cyanosis, NECK:  no jugular vein distention, no retractions, no thyromegaly or masses, LUNGS: decreased breath sounds bilaterally but CTAB today , HEART:  Regular rate and rhythm with no MGR; no edema is present, ABDOMEN:  soft with no tenderness, bowel sounds present, EXTREMITIES:  warm with no cyanosis, SKIN:  no jaundice or ecchymosis and NEUROLOGIC:  alert and oriented, grossly non-focal    Jackman MD Polly  [facility]

## 2018-04-05 NOTE — PROGRESS NOTES
Prosper Azevedo gabriela Bowdle 81  [address]  [phone]      Medical Progress Note      NAME: Corine Thornton   :  1940  MRM:  [id number]    Date/Time: 2018            Subjective:     Chief Complaint: no acute events. Pt reports feeling overall improved. Still short of breath. Has not really walked. Asking when she can go home            Objective:       Vitals:          Last 24hrs VS reviewed since prior progress note.  Most recent are:    Visit Vitals    /67 (BP 1 Location: Left arm, BP Patient Position: At rest;Sitting)    Pulse (!) 112    Temp 98 °F (36.7 °C)    Resp 14    Ht 5' 6\" (1.676 m)    Wt 58.1 kg (128 lb 1.4 oz)    SpO2 95%    Breastfeeding No    BMI 20.67 kg/m2     SpO2 Readings from Last 6 Encounters:   18 95%   02/15/18 95%   18 96%   18 93%   01/15/18 97%   11/10/17 99%    O2 Flow Rate (L/min): 2 l/min       Intake/Output Summary (Last 24 hours) at 18 1222  Last data filed at 18 0932   Gross per 24 hour   Intake              120 ml   Output                0 ml   Net              120 ml          Exam:     Physical Exam:    Gen:  thin, in no acute distress  HEENT:  Pink conjunctivae, PERRL, hearing intact to voice, moist mucous membranes  Neck:  Supple, without masses, thyroid non-tender  Resp:  No accessory muscle use, clear breath sounds without wheezes rales or rhonchi  Card:  No murmurs, normal S1, S2 without thrills, bruits or peripheral edema  Abd:  Soft, non-tender, non-distended, normoactive bowel sounds are present  Musc:  No cyanosis or clubbing  Skin:  No rashes or ulcers, skin turgor is good  Neuro:  No focal deficits, follows commands appropriately  Psych:  Good insight, oriented to person, place and time, alert        Medications Reviewed: (see below)    Lab Data Reviewed: (see below)    ______________________________________________________________________    Medications:     Current Facility-Administered Medications   Medication Dose Route Frequency    rosuvastatin (CRESTOR) tablet 20 mg  20 mg Oral QHS    methylPREDNISolone (PF) (SOLU-MEDROL) injection 60 mg  60 mg IntraVENous Q8H    [START ON 4/6/2018] enoxaparin (LOVENOX) injection 40 mg  40 mg SubCUTAneous Q24H    acetaminophen (TYLENOL) tablet 650 mg  650 mg Oral Q6H PRN    albuterol-ipratropium (DUO-NEB) 2.5 MG-0.5 MG/3 ML  3 mL Nebulization Q4H RT    clopidogrel (PLAVIX) tablet 75 mg  75 mg Oral DAILY    sodium chloride (NS) flush 5-10 mL  5-10 mL IntraVENous Q8H    sodium chloride (NS) flush 5-10 mL  5-10 mL IntraVENous PRN    budesonide (PULMICORT) 500 mcg/2 ml nebulizer suspension  500 mcg Nebulization BID RT    docusate sodium (COLACE) capsule 100 mg  100 mg Oral BID    levoFLOXacin (LEVAQUIN) 750 mg in D5W IVPB  750 mg IntraVENous Q24H    arformoterol (BROVANA) neb solution 15 mcg  15 mcg Nebulization BID RT    aspirin chewable tablet 81 mg  81 mg Oral EVERY OTHER DAY    pantoprazole (PROTONIX) tablet 40 mg  40 mg Oral DAILY    cefepime (MAXIPIME) 2 g in 0.9% sodium chloride (MBP/ADV) 100 mL  2 g IntraVENous Q12H    lisinopril/hydroCHLOROthiazide (PRINZIDE/ZESTORETIC) 10/12.5   Oral DAILY            Lab Review:     Recent Labs      04/05/18   0426  04/03/18   0854   WBC  15.4*  11.7*   HGB  11.9  16.8*   HCT  37.4  52.2*   PLT  232  338     Recent Labs      04/05/18   0426  04/03/18   0937   NA  137  130*   K  4.3  4.5   CL  106  92*   CO2  27  23   GLU  163*  167*   BUN  26*  17   CREA  0.72  0.89   CA  8.4*  9.3   MG  2.0   --    PHOS  2.5*   --    ALB   --   3.4*   SGOT   --   90*   ALT   --   53     No components found for: GLPOC         Assessment / Plan:     COPD exacerbation: improved and off BiPAP. Continue scheduled nebs, IV steroids. Levofloxacin. ICS/LABA. viral panel +for metapneumovirus. Check sputum culture.     NSTEMI / CAD: reviewed by cardiology.  On full dose lovenox, will discontinue today - no plans for cath as per cardiology. Continue asa, plavix. Pt not interested in starting BB this hospital stay    Pulmonary edema: suspect only mild if present. Respiratory status improving without diuresis     HTN (hypertension): holding home lisinopril/HCTZ     GERD (gastroesophageal reflux disease): Resume PPI     Acute respiratory failure with hypoxia: 2/2 COPD Continue supplemental oxygen and NIPPV. Not on home oxygen at baseline     Hypotension: improving with supportive care and now off vasopressors.      Total time spent with patient: [phone] [facility] discussed with: Patient, Family and Nursing Staff    Discussed:  Care Plan    Prophylaxis:  Lovenox    Disposition:  Home w/Family           ___________________________________________________    Attending Physician: Roxanna Mcburney, MD

## 2018-04-06 LAB
ANION GAP SERPL CALC-SCNC: 7 MMOL/L (ref 5–15)
BUN SERPL-MCNC: 22 MG/DL (ref 6–20)
BUN/CREAT SERPL: 35 (ref 12–20)
CALCIUM SERPL-MCNC: 8.9 MG/DL (ref 8.5–10.1)
CHLORIDE SERPL-SCNC: 104 MMOL/L (ref 97–108)
CO2 SERPL-SCNC: 27 MMOL/L (ref 21–32)
CREAT SERPL-MCNC: 0.62 MG/DL (ref 0.55–1.02)
ERYTHROCYTE [DISTWIDTH] IN BLOOD BY AUTOMATED COUNT: 15.3 % (ref 11.5–14.5)
GLUCOSE SERPL-MCNC: 173 MG/DL (ref 65–100)
HCT VFR BLD AUTO: 37.1 % (ref 35–47)
HGB BLD-MCNC: 12 G/DL (ref 11.5–16)
MAGNESIUM SERPL-MCNC: 2 MG/DL (ref 1.6–2.4)
MCH RBC QN AUTO: 28.9 PG (ref 26–34)
MCHC RBC AUTO-ENTMCNC: 32.3 G/DL (ref 30–36.5)
MCV RBC AUTO: 89.4 FL (ref 80–99)
NRBC # BLD: 0 K/UL (ref 0–0.01)
NRBC BLD-RTO: 0 PER 100 WBC
PHOSPHATE SERPL-MCNC: 2.8 MG/DL (ref 2.6–4.7)
PLATELET # BLD AUTO: 232 K/UL (ref 150–400)
PMV BLD AUTO: 10.7 FL (ref 8.9–12.9)
POTASSIUM SERPL-SCNC: 3.9 MMOL/L (ref 3.5–5.1)
RBC # BLD AUTO: 4.15 M/UL (ref 3.8–5.2)
SODIUM SERPL-SCNC: 138 MMOL/L (ref 136–145)
WBC # BLD AUTO: 14.9 K/UL (ref 3.6–11)

## 2018-04-06 PROCEDURE — 97161 PT EVAL LOW COMPLEX 20 MIN: CPT

## 2018-04-06 PROCEDURE — 97535 SELF CARE MNGMENT TRAINING: CPT

## 2018-04-06 PROCEDURE — 97116 GAIT TRAINING THERAPY: CPT

## 2018-04-06 PROCEDURE — 36415 COLL VENOUS BLD VENIPUNCTURE: CPT | Performed by: INTERNAL MEDICINE

## 2018-04-06 PROCEDURE — 74011250637 HC RX REV CODE- 250/637: Performed by: NURSE PRACTITIONER

## 2018-04-06 PROCEDURE — 84100 ASSAY OF PHOSPHORUS: CPT | Performed by: INTERNAL MEDICINE

## 2018-04-06 PROCEDURE — 74011000250 HC RX REV CODE- 250: Performed by: INTERNAL MEDICINE

## 2018-04-06 PROCEDURE — 74011250636 HC RX REV CODE- 250/636: Performed by: NURSE PRACTITIONER

## 2018-04-06 PROCEDURE — 94640 AIRWAY INHALATION TREATMENT: CPT

## 2018-04-06 PROCEDURE — 74011250636 HC RX REV CODE- 250/636: Performed by: INTERNAL MEDICINE

## 2018-04-06 PROCEDURE — 83735 ASSAY OF MAGNESIUM: CPT | Performed by: INTERNAL MEDICINE

## 2018-04-06 PROCEDURE — 74011250637 HC RX REV CODE- 250/637: Performed by: INTERNAL MEDICINE

## 2018-04-06 PROCEDURE — 80048 BASIC METABOLIC PNL TOTAL CA: CPT | Performed by: INTERNAL MEDICINE

## 2018-04-06 PROCEDURE — 77030038269 HC DRN EXT URIN PURWCK BARD -A

## 2018-04-06 PROCEDURE — 65660000000 HC RM CCU STEPDOWN

## 2018-04-06 PROCEDURE — 97530 THERAPEUTIC ACTIVITIES: CPT

## 2018-04-06 PROCEDURE — 85027 COMPLETE CBC AUTOMATED: CPT | Performed by: INTERNAL MEDICINE

## 2018-04-06 PROCEDURE — 97165 OT EVAL LOW COMPLEX 30 MIN: CPT

## 2018-04-06 PROCEDURE — 77010033678 HC OXYGEN DAILY

## 2018-04-06 RX ORDER — POLYETHYLENE GLYCOL 3350 17 G/17G
17 POWDER, FOR SOLUTION ORAL DAILY PRN
Status: DISCONTINUED | OUTPATIENT
Start: 2018-04-06 | End: 2018-04-09 | Stop reason: HOSPADM

## 2018-04-06 RX ORDER — IPRATROPIUM BROMIDE AND ALBUTEROL SULFATE 2.5; .5 MG/3ML; MG/3ML
3 SOLUTION RESPIRATORY (INHALATION)
Status: DISCONTINUED | OUTPATIENT
Start: 2018-04-06 | End: 2018-04-09 | Stop reason: HOSPADM

## 2018-04-06 RX ORDER — GUAIFENESIN 600 MG/1
600 TABLET, EXTENDED RELEASE ORAL EVERY 12 HOURS
Status: DISCONTINUED | OUTPATIENT
Start: 2018-04-06 | End: 2018-04-09 | Stop reason: HOSPADM

## 2018-04-06 RX ORDER — GUAIFENESIN/DEXTROMETHORPHAN 100-10MG/5
10 SYRUP ORAL 2 TIMES DAILY
Status: DISCONTINUED | OUTPATIENT
Start: 2018-04-06 | End: 2018-04-06

## 2018-04-06 RX ADMIN — PANTOPRAZOLE SODIUM 40 MG: 40 TABLET, DELAYED RELEASE ORAL at 09:12

## 2018-04-06 RX ADMIN — METHYLPREDNISOLONE SODIUM SUCCINATE 40 MG: 40 INJECTION, POWDER, FOR SOLUTION INTRAMUSCULAR; INTRAVENOUS at 13:38

## 2018-04-06 RX ADMIN — DOCUSATE SODIUM 100 MG: 100 CAPSULE, LIQUID FILLED ORAL at 17:33

## 2018-04-06 RX ADMIN — LISINOPRIL: 5 TABLET ORAL at 09:12

## 2018-04-06 RX ADMIN — Medication 10 ML: at 05:18

## 2018-04-06 RX ADMIN — Medication 10 ML: at 13:43

## 2018-04-06 RX ADMIN — METHYLPREDNISOLONE SODIUM SUCCINATE 60 MG: 40 INJECTION, POWDER, FOR SOLUTION INTRAMUSCULAR; INTRAVENOUS at 05:12

## 2018-04-06 RX ADMIN — POLYETHYLENE GLYCOL (3350) 17 G: 17 POWDER, FOR SOLUTION ORAL at 21:53

## 2018-04-06 RX ADMIN — LEVOFLOXACIN 750 MG: 5 INJECTION, SOLUTION INTRAVENOUS at 12:05

## 2018-04-06 RX ADMIN — ARFORMOTEROL TARTRATE 15 MCG: 15 SOLUTION RESPIRATORY (INHALATION) at 07:16

## 2018-04-06 RX ADMIN — Medication 10 ML: at 21:53

## 2018-04-06 RX ADMIN — IPRATROPIUM BROMIDE AND ALBUTEROL SULFATE 3 ML: .5; 3 SOLUTION RESPIRATORY (INHALATION) at 07:16

## 2018-04-06 RX ADMIN — BUDESONIDE 500 MCG: 0.5 INHALANT RESPIRATORY (INHALATION) at 19:39

## 2018-04-06 RX ADMIN — CLOPIDOGREL BISULFATE 75 MG: 75 TABLET ORAL at 09:12

## 2018-04-06 RX ADMIN — DOCUSATE SODIUM 100 MG: 100 CAPSULE, LIQUID FILLED ORAL at 09:13

## 2018-04-06 RX ADMIN — GUAIFENESIN 600 MG: 600 TABLET, EXTENDED RELEASE ORAL at 20:58

## 2018-04-06 RX ADMIN — METHYLPREDNISOLONE SODIUM SUCCINATE 40 MG: 40 INJECTION, POWDER, FOR SOLUTION INTRAMUSCULAR; INTRAVENOUS at 21:53

## 2018-04-06 RX ADMIN — ROSUVASTATIN CALCIUM 20 MG: 10 TABLET, FILM COATED ORAL at 20:58

## 2018-04-06 RX ADMIN — BUDESONIDE 500 MCG: 0.5 INHALANT RESPIRATORY (INHALATION) at 07:16

## 2018-04-06 RX ADMIN — ARFORMOTEROL TARTRATE 15 MCG: 15 SOLUTION RESPIRATORY (INHALATION) at 19:39

## 2018-04-06 RX ADMIN — ENOXAPARIN SODIUM 40 MG: 40 INJECTION SUBCUTANEOUS at 09:13

## 2018-04-06 NOTE — PROGRESS NOTES
Occupational Therapy EVALUATION/discharge  Patient: Ned Ferreira (71 y.o. female)  Date: 4/6/2018  Primary Diagnosis: COPD exacerbation ([facility] [address]        Precautions: contact       ASSESSMENT:   Based on the objective data described below, the patient presents at her baseline in regards to functional abilities following admission on 4/3 for COPD exacerbation with c/o SOB. Patient lives with her , managed all care independently, drives, and stays active. Today, patient was received on 2L O2 at start of tx and weaned to room air. SpO2 dropped briefly to 86% with transfer into the bathroom however able to recover to >90% on room air within 30 seconds. Patient provided extensive education regarding energy conservation, pacing, and pursed lip breathing techniques. Patient was independent with functional mobility/transfers and independent/mod I level for completion of all ADLs. Patient has no further skilled OT needs and is cleared from OT services at this time. Discharge Recommendations: none for OT  Further Equipment Recommendations for Discharge: none       SUBJECTIVE:   Patient stated I really don't want to go home with oxygen.   Patient receptive to all energy conservation education and states a goal for no O2 needs at home. OBJECTIVE DATA SUMMARY:   HISTORY:   Past Medical History:   Diagnosis Date    Asthma 10/11/2012    CAD (coronary artery disease) 4/3/2018    GERD (gastroesophageal reflux disease) 4/1/2009    HTN 4/1/2009    Menopausal state 4/1/2009    Serum lipid, elevated 4/1/2009     Past Surgical History:   Procedure Laterality Date    COLONOSCOPY N/A 8/31/2016    COLONOSCOPY performed by Enedina Miranda MD at [address] [place] [address] HX CHOLECYSTECTOMY  2012       Prior Level of Function/Environment/Context: Patient lives with her . She reports independence with ADLs and functional mobility PTA.       Home Situation  Home Environment: Private residence  # Steps to Enter: 3  Rails to Enter: Yes  Hand Rails : Bilateral  One/Two Story Residence: Two story  # of Interior Steps: 13  Living Alone: No  Support Systems: Spouse/Significant Other/Partner  Patient Expects to be Discharged to[de-identified] Private residence  Current DME Used/Available at Home:  (walking stick)  Tub or Shower Type: Shower (built in seat)  [x]  Right hand dominant   []  Left hand dominant    EXAMINATION OF PERFORMANCE DEFICITS:  Cognitive/Behavioral Status:  Neurologic State: Alert  Orientation Level: Oriented X4  Cognition: Appropriate decision making; Appropriate for age attention/concentration; Appropriate safety awareness; Follows commands  Perception: Appears intact  Perseveration: No perseveration noted  Safety/Judgement: Awareness of environment    Skin: Intact In the uppers    Edema: None noted in the uppers    Hearing: Auditory  Auditory Impairment: None    Vision/Perceptual:    Tracking: Able to track stimulus in all quadrants w/o difficulty    Diplopia: No    Acuity: Within Defined Limits    Corrective Lenses: Glasses    Range of Motion:  AROM: Within functional limits in the uppers  PROM: Within functional limits in the uppers    Strength:  Strength: Within functional limits in the uppers    Coordination:  Coordination: Within functional limits    Tone & Sensation:  Tone: Normal  Sensation: intact     Balance:  Sitting: Intact  Standing: Intact; Without support    Functional Mobility and Transfers for ADLs:  Bed Mobility:  Rolling: Independent  Supine to Sit: Independent  Sit to Supine: Independent  Scooting: Independent    Transfers:  Sit to Stand: Independent  Stand to Sit: Independent  Bed to Chair: Independent  Toilet Transfer : Independent    ADL Assessment:  Feeding: Independent    Oral Facial Hygiene/Grooming: Independent    Bathing: Modified independent    Upper Body Dressing: Independent    Lower Body Dressing: Modified independent    Toileting: Modified independent    Cognitive Retraining  Safety/Judgement: Awareness of environment    Functional Measure:  Barthel Index:    Bathin  Bladder: 10  Bowels: 10  Groomin  Dressing: 10  Feeding: 10  Mobility: 15  Stairs: 10  Toilet Use: 10  Transfer (Bed to Chair and Back): 15  Total: 100       Barthel and G-code impairment scale:  Percentage of impairment CH  0% CI  1-19% CJ  20-39% CK  40-59% CL  60-79% CM  80-99% CN  100%   Barthel Score 0-100 100 99-80 79-60 59-40 20-39 1-19   0   Barthel Score 0-20 20 17-19 13-16 9-12 5-8 1-4 0      The Barthel ADL Index: Guidelines  1. The index should be used as a record of what a patient does, not as a record of what a patient could do. 2. The main aim is to establish degree of independence from any help, physical or verbal, however minor and for whatever reason. 3. The need for supervision renders the patient not independent. 4. A patient's performance should be established using the best available evidence. Asking the patient, friends/relatives and nurses are the usual sources, but direct observation and common sense are also important. However direct testing is not needed. 5. Usually the patient's performance over the preceding 24-48 hours is important, but occasionally longer periods will be relevant. 6. Middle categories imply that the patient supplies over 50 per cent of the effort. 7. Use of aids to be independent is allowed. Winston Cline., Barthel, DJAIME. (5130). Functional evaluation: the Barthel Index. [address] [facility] (14)2. DONA ManciniJJoyMRONALD, Leatha aRmsey., Adalgisa Witt., [place], [address] [facility] (). Measuring the change indisability after inpatient rehabilitation; comparison of the responsiveness of the Barthel Index and Functional [place] Measure. Journal of Neurology, Neurosurgery, and Psychiatry, 66(4), 923-660. SHANNAN Jerez.BRAYAN, HAMZAH Perez, & Jamar Cameron MJoyA. (2004.) Assessment of post-stroke quality of life in cost-effectiveness studies:  The usefulness of the Barthel Index and the EuroQoL-5D. Quality of Life Research, 13, 525-55       G codes: In compliance with CMSs Claims Based Outcome Reporting, the following G-code set was chosen for this patient based on their primary functional limitation being treated: The outcome measure chosen to determine the severity of the functional limitation was the Barthel Index with a score of 100/100 which was correlated with the impairment scale. ? Self Care:     - CURRENT STATUS: CH - 0% impaired, limited or restricted    - GOAL STATUS: CH - 0% impaired, limited or restricted    - D/C STATUS:  CH - 0% impaired, limited or restricted     Occupational Therapy Evaluation Charge Determination   History Examination Decision-Making   LOW Complexity : Brief history review  LOW Complexity : 1-3 performance deficits relating to physical, cognitive , or psychosocial skils that result in activity limitations and / or participation restrictions  LOW Complexity : No comorbidities that affect functional and no verbal or physical assistance needed to complete eval tasks       Based on the above components, the patient evaluation is determined to be of the following complexity level: LOW   Pain:  Pain Scale 1: Numeric (0 - 10)  Pain Intensity 1: 0              Activity Tolerance:   Patient tolerated eval well. Please refer to the flowsheet for vital signs taken during this treatment. After treatment:   [x]  Patient left in no apparent distress sitting up in chair  []  Patient left in no apparent distress in bed  [x]  Call bell left within reach  [x]  Nursing notified  [x]  Caregiver present-  []  Bed alarm activated    COMMUNICATION/EDUCATION:   Communication/Collaboration:  [x]      Home safety education was provided and the patient/caregiver indicated understanding. [x]      Patient/family have participated as able and agree with findings and recommendations.   []      Patient is unable to participate in plan of care at this time. Findings and recommendations were discussed with: Physical Therapist, Registered Nurse and patient.     Bianca Bynum, OTR/L  Time Calculation: 42 mins

## 2018-04-06 NOTE — PROGRESS NOTES
4/6/2018  3:56 PM  EMR reviewed, walking oximetry performed today, Pt not requiring home O2 at this time. CM will follow for d/c needs, would benefit from H2H/COPD visit if no services indicated.   Jose Hardin

## 2018-04-06 NOTE — PROGRESS NOTES
Primary Nurse Goldie Long RN and Shelley Pascual RN performed a dual skin assessment on this patient No impairment noted  Cedric score is 20

## 2018-04-06 NOTE — PROGRESS NOTES
Problem: Falls - Risk of  Goal: *Absence of Falls  Document Marc Fall Risk and appropriate interventions in the flowsheet.    Outcome: Progressing Towards Goal  Fall Risk Interventions:  Mobility Interventions: Assess mobility with egress test, Bed/chair exit alarm, Communicate number of staff needed for ambulation/transfer, OT consult for ADLs, Patient to call before getting OOB, PT Consult for mobility concerns, PT Consult for assist device competence, Strengthening exercises (ROM-active/passive), Utilize walker, cane, or other assitive device         Medication Interventions: Assess postural VS orthostatic hypotension, Bed/chair exit alarm, Evaluate medications/consider consulting pharmacy, Patient to call before getting OOB, Teach patient to arise slowly    Elimination Interventions: Bed/chair exit alarm, Call light in reach, Elevated toilet seat, Patient to call for help with toileting needs, Toilet paper/wipes in reach, Toileting schedule/hourly rounds

## 2018-04-06 NOTE — PROGRESS NOTES
Shift Summary    1900  Bedside and Verbal shift change report given to Jessica Eric RN (oncoming nurse) by Karla Leung RN (offgoing nurse). Report included the following information SBAR, Kardex, MAR, Recent Results and Cardiac Rhythm  . 2245  TRANSFER - OUT REPORT:    Verbal report given to Teodoro Brady RN(name) on Kenn Hwang  being transferred to [facility](unit) for routine progression of care       Report consisted of patients Situation, Background, Assessment and   Recommendations(SBAR). Information from the following report(s) SBAR, Kardex, MAR, Recent Results and Cardiac Rhythm ST was reviewed with the receiving nurse. Lines:   Peripheral IV 04/03/18 Right Antecubital (Active)   Site Assessment Clean, dry, & intact 4/5/2018  3:23 PM   Phlebitis Assessment 0 4/5/2018  3:23 PM   Infiltration Assessment 0 4/5/2018  3:23 PM   Dressing Status Clean, dry, & intact 4/5/2018  3:23 PM   Dressing Type Transparent 4/5/2018  3:23 PM   Hub Color/Line Status Pink 4/5/2018  3:23 PM   Action Taken Open ports on tubing capped 4/5/2018  3:23 PM   Alcohol Cap Used Yes 4/5/2018  3:23 PM       Peripheral IV 04/03/18 Left Forearm (Active)   Site Assessment Clean, dry, & intact 4/5/2018  3:23 PM   Phlebitis Assessment 0 4/5/2018  3:23 PM   Infiltration Assessment 0 4/5/2018  3:23 PM   Dressing Status Clean, dry, & intact 4/5/2018  3:23 PM   Dressing Type Transparent 4/5/2018  3:23 PM   Hub Color/Line Status Pink 4/5/2018  3:23 PM   Action Taken Open ports on tubing capped 4/5/2018  3:23 PM   Alcohol Cap Used Yes 4/5/2018  3:23 PM        Opportunity for questions and clarification was provided.       Patient transported with:   Monitor  Registered Nurse

## 2018-04-06 NOTE — PROGRESS NOTES
[facility]     Name: Jay Edwards MRN: [id number]   : 1940 Hospital: [address]   Date: 2018        Impression Plan   1. Acute hypercapnic and hypoxic respiratory failure  2. COPD with exacerbation; metapneumovirus positive  3. Hypotension  4. Abnormal chest imaging, CXR concerning for pulmonary edema  5. GERD  6. Diarrhea, improving         · Wean O2 as tolerated, goal sats >90%  · Can use BiPAP prn   · Duonebs PRN  · Scheduled Brovana/Pulmicort  · Levofloxacin, will plan on a 5 day course  · IV steroids, will wean again today to 40 mg Q8; will need slow prednisone taper at discharge, 60x3, 50x3, etc.  · Follow-up cultures  · Cardiology following, will need additional workup as an outpatient  · Regular diet  · Needs 6MWT prior to discharge to assess oxygen needs   · DVT prophylaxis: Lovenox  · GI prophylaxis: Protonix    Patient stable from a pulmonary standpoint. Will sign off and see again if needed. Will arrange for outpatient pulmonary follow up with Dr. Georgia Marsh. Radiology  ( personally reviewed) CXR with increased bilateral interstitial markings concerning for pulmonary edema  TTE report reviewed:EF 78-26%, grade 1 diastolic dysfunction, PASP 25   ABG No results for input(s): PHI, PO2I, PCO2I in the last 72 hours. Subjective     Feeling better today. Coughing, but not much sputum production. Still having SOB on exertion. No wheezing. No chest pain or fevers. Anxious about having to go home on oxygen. She stated that she has 6MWT to be completed tomorrow and is nervous. Discussed in detail that this may not be permanent and that she should not be stressed out if she needs O2 with exertion for some time following hospitalization.      Overnight Events:   Afebrile  Tachycardic  BP stable  Oxygen saturations 93% 1L (on RA when going into room)  WBC 14.9  HgB 12.0  Na 138  K 3.9    Creatinine 0.62  Phos 2.8  Mg 2.0    Review of Systems:  A comprehensive review of systems was negative except for that written in the HPI. Past Medical History:   Diagnosis Date    Asthma 10/11/2012    CAD (coronary artery disease) 4/3/2018    GERD (gastroesophageal reflux disease) 4/1/2009    HTN 4/1/2009    Menopausal state 4/1/2009    Serum lipid, elevated 4/1/2009      Past Surgical History:   Procedure Laterality Date    COLONOSCOPY N/A 8/31/2016    COLONOSCOPY performed by Rose Anthony MD at [address] [facility]  2012      Prior to Admission medications    Medication Sig Start Date End Date Taking? Authorizing Provider   doxycycline (MONODOX) 100 mg capsule Take 100 mg by mouth two (2) times a day. Yes Leigh Bocanegra MD   predniSONE (DELTASONE) 20 mg tablet Take 20 mg by mouth. 3 every day for 4 days, 2 every day for 5 days, 1 every day for 5 days, 1/2 every day for 5 days   Yes Historical Provider   rosuvastatin (CRESTOR) 20 mg tablet Take 20 mg by mouth nightly. Yes Historical Provider   omeprazole (PRILOSEC) 20 mg capsule Take 1 Cap by mouth daily for 90 days. This to replace script for tablets 3/10/18 6/8/18 Yes Nancy Singh MD   lisinopril-hydroCHLOROthiazide (PRINZIDE, ZESTORETIC) 10-12.5 mg per tablet Take 1 Tab by mouth daily for 90 days. 1/11/18 4/11/18 Yes Nancy Singh MD   KRILL OIL PO Take 1 Cap by mouth daily. Yes Historical Provider   budesonide-formoterol (SYMBICORT) 160-4.5 mcg/actuation HFA inhaler Take 2 Puffs by inhalation two (2) times a day. 11/9/15  Yes Nancy Singh MD   magnesium 250 mg tab Take 500 mg by mouth daily. Yes Historical Provider   aclidinium bromide (TUDORZA PRESSAIR) 400 mcg/actuation inhaler Take 1 Puff by inhalation daily. Yes Historical Provider   cholecalciferol (VITAMIN D3) 1,000 unit tablet Take 1,000 Units by mouth daily.    Yes Historical Provider   albuterol (PROVENTIL HFA, VENTOLIN HFA) 90 mcg/actuation inhaler Take 2 Puffs by inhalation every four (4) hours as needed for Wheezing. 11/7/12  Yes Ana Laura Ritter MD   CALCIUM CARBONATE/VITAMIN D3 (CALCIUM + D PO) Take 1 Cap by mouth daily. Yes Historical Provider   ASPIRIN 81 mg chewable tablet Take 81 mg by mouth daily. Yes Historical Provider     Current Facility-Administered Medications   Medication Dose Route Frequency    methylPREDNISolone (PF) (SOLU-MEDROL) injection 40 mg  40 mg IntraVENous Q8H    rosuvastatin (CRESTOR) tablet 20 mg  20 mg Oral QHS    enoxaparin (LOVENOX) injection 40 mg  40 mg SubCUTAneous Q24H    clopidogrel (PLAVIX) tablet 75 mg  75 mg Oral DAILY    sodium chloride (NS) flush 5-10 mL  5-10 mL IntraVENous Q8H    budesonide (PULMICORT) 500 mcg/2 ml nebulizer suspension  500 mcg Nebulization BID RT    docusate sodium (COLACE) capsule 100 mg  100 mg Oral BID    levoFLOXacin (LEVAQUIN) 750 mg in D5W IVPB  750 mg IntraVENous Q24H    arformoterol (BROVANA) neb solution 15 mcg  15 mcg Nebulization BID RT    aspirin chewable tablet 81 mg  81 mg Oral EVERY OTHER DAY    pantoprazole (PROTONIX) tablet 40 mg  40 mg Oral DAILY    lisinopril/hydroCHLOROthiazide (PRINZIDE/ZESTORETIC) 10/12.5   Oral DAILY     Allergies   Allergen Reactions    Augmentin [Amoxicillin-Pot Clavulanate] Nausea Only      Social History   Substance Use Topics    Smoking status: Former Smoker     Packs/day: 1.00     Years: 20.00     Types: Cigarettes     Quit date: 1/1/1980    Smokeless tobacco: Never Used    Alcohol use 0.6 oz/week     1 Glasses of wine per week      Comment: daily glass of wine      Family History   Problem Relation Age of Onset    Heart Attack Mother     Hypertension Mother     High Cholesterol Mother     Hypertension Father     Hypertension Brother           Laboratory: I have personally reviewed the critical care flowsheet and labs.      Recent Labs      04/06/18   0120  04/05/18   0426   WBC  14.9*  15.4*   HGB  12.0  11.9   HCT  37.1  37.4   PLT  232  232     Recent Labs 04/06/18   0120  04/05/18   0426   NA  138  137   K  3.9  4.3   CL  104  106   CO2  27  27   GLU  173*  163*   BUN  22*  26*   CREA  0.62  0.72   CA  8.9  8.4*   MG  2.0  2.0   PHOS  2.8  2.5*       Objective:     Mode Rate Tidal Volume Pressure FiO2 PEEP            100 %       Vital Signs:     TMAX(24)      Intake/Output:   Last shift:      Ventilator Volumes  Vt Spont (ml): 693 ml  Ve Observed (l/min): 15.7 l/min  Last 3 shifts: 04/06 0701 - 04/06 1900  In: 360 [P.O.:360]  Out: 300 [Urine:300][id number]    Intake/Output Summary (Last 24 hours) at 04/06/18 1205  Last data filed at 04/06/18 0925   Gross per 24 hour   Intake              560 ml   Output              300 ml   Net              260 ml     EXAM:   GENERAL: well developed and in no distress, HEENT:  PERRL, EOMI, no alar flaring or epistaxis, oral mucosa moist without cyanosis, NECK:  no jugular vein distention, no retractions, no thyromegaly or masses, LUNGS: decreased breath sounds bilaterally but CTAB today , HEART:  Regular rate and rhythm with no MGR; no edema is present, ABDOMEN:  soft with no tenderness, bowel sounds present, EXTREMITIES:  warm with no cyanosis, SKIN:  no jaundice or ecchymosis and NEUROLOGIC:  alert and oriented, grossly non-focal    Kenzie Melton, NP  [facility]

## 2018-04-06 NOTE — PROGRESS NOTES
[facility] [address], [place], [address] [facility]  [phone]      Medical Progress Note      NAME: Ned Ferreira   :  1940  MRM:  [id number]    Date/Time: 2018            Subjective:     Chief Complaint: no acute events. Pt reports feeling overall improved. Shortness of breath continues especially with walking and talking          Objective:       Vitals:          Last 24hrs VS reviewed since prior progress note.  Most recent are:    Visit Vitals    /80 (BP 1 Location: Left arm, BP Patient Position: Sitting)    Pulse (!) 105    Temp 97.5 °F (36.4 °C)    Resp 27    Ht 5' 6\" (1.676 m)    Wt 60.4 kg (133 lb 3.2 oz)    SpO2 93%    Breastfeeding No    BMI 21.5 kg/m2     SpO2 Readings from Last 6 Encounters:   18 93%   02/15/18 95%   18 96%   18 93%   01/15/18 97%   11/10/17 99%    O2 Flow Rate (L/min): 1 l/min       Intake/Output Summary (Last 24 hours) at 18 1437  Last data filed at 18 0925   Gross per 24 hour   Intake              560 ml   Output              300 ml   Net              260 ml          Exam:     Physical Exam:    Gen:  thin, in no acute distress  HEENT:  Pink conjunctivae, PERRL, hearing intact to voice, moist mucous membranes  Neck:  Supple, without masses, thyroid non-tender  Resp:  No accessory muscle use, clear breath sounds without wheezes rales or rhonchi  Card:  No murmurs, normal S1, S2 without thrills, bruits or peripheral edema  Abd:  Soft, non-tender, non-distended, normoactive bowel sounds are present  Musc:  No cyanosis or clubbing  Skin:  No rashes or ulcers, skin turgor is good  Neuro:  No focal deficits, follows commands appropriately  Psych:  Good insight, oriented to person, place and time, alert        Medications Reviewed: (see below)    Lab Data Reviewed: (see below)    ______________________________________________________________________    Medications:     Current Facility-Administered Medications   Medication Dose Route Frequency    albuterol-ipratropium (DUO-NEB) 2.5 MG-0.5 MG/3 ML  3 mL Nebulization Q4H PRN    methylPREDNISolone (PF) (SOLU-MEDROL) injection 40 mg  40 mg IntraVENous Q8H    rosuvastatin (CRESTOR) tablet 20 mg  20 mg Oral QHS    enoxaparin (LOVENOX) injection 40 mg  40 mg SubCUTAneous Q24H    acetaminophen (TYLENOL) tablet 650 mg  650 mg Oral Q6H PRN    clopidogrel (PLAVIX) tablet 75 mg  75 mg Oral DAILY    sodium chloride (NS) flush 5-10 mL  5-10 mL IntraVENous Q8H    sodium chloride (NS) flush 5-10 mL  5-10 mL IntraVENous PRN    budesonide (PULMICORT) 500 mcg/2 ml nebulizer suspension  500 mcg Nebulization BID RT    docusate sodium (COLACE) capsule 100 mg  100 mg Oral BID    levoFLOXacin (LEVAQUIN) 750 mg in D5W IVPB  750 mg IntraVENous Q24H    arformoterol (BROVANA) neb solution 15 mcg  15 mcg Nebulization BID RT    aspirin chewable tablet 81 mg  81 mg Oral EVERY OTHER DAY    pantoprazole (PROTONIX) tablet 40 mg  40 mg Oral DAILY    lisinopril/hydroCHLOROthiazide (PRINZIDE/ZESTORETIC) 10/12.5   Oral DAILY            Lab Review:     Recent Labs      04/06/18   0120  04/05/18   0426   WBC  14.9*  15.4*   HGB  12.0  11.9   HCT  37.1  37.4   PLT  232  232     Recent Labs      04/06/18   0120  04/05/18   0426   NA  138  137   K  3.9  4.3   CL  104  106   CO2  27  27   GLU  173*  163*   BUN  22*  26*   CREA  0.62  0.72   CA  8.9  8.4*   MG  2.0  2.0   PHOS  2.8  2.5*     No components found for: [place]         Assessment / Plan:     COPD exacerbation: improved and off BiPAP. Continue scheduled nebs, IV steroids. Levofloxacin. ICS/LABA. viral panel +for metapneumovirus. Check sputum culture. Tentative plan to go home Sunday if continues to improve     Type 2 NSTEMI / CAD: POA reviewed by cardiology. Continue asa, plavix. Pt not interested in starting BB this hospital stay    Pulmonary edema: suspect only mild if present.  Respiratory status improving without diuresis     HTN (hypertension): holding home lisinopril/HCTZ     GERD (gastroesophageal reflux disease): Resume PPI     Acute respiratory failure with hypoxia: 2/2 COPD Continue supplemental oxygen and NIPPV. Not on home oxygen at baseline     Hypotension: improving with supportive care and now off vasopressors.      Total time spent with patient: 30 300 Brendan Hanley discussed with: Patient, Family and Nursing Staff    Discussed:  Care Plan    Prophylaxis:  Lovenox    Disposition:  Home w/Family           ___________________________________________________    Attending Physician: Hector Serrato MD

## 2018-04-06 NOTE — PROGRESS NOTES
physical Therapy EVALUATION/DISCHARGE  Patient: Arnaud Bradford (71 y.o. female)  Date: 4/6/2018  Primary Diagnosis: COPD exacerbation ([address]        Precautions: Contact precaution. ASSESSMENT :  Based on the objective data described above, the patient presents with independent with ambulation without assistive device and independent with all functional mobility. Patient at her base line level of function goes in and out of the bathroom by herself. Need some reminder to slow down at times. Educate patient about energy conservation and purse lip breathing verbalized understanding. Reviewed all safety precaution and home exercise program with the patient, verbalized understanding, clear to go home per Physical Therapy perspective. Skilled physical therapy is not indicated at this time. Documentation for home O2:     ROOM AIR    AT REST   O2 SATS  97% HR  110   ROOM AIR WITH ACTIVITY 02 SATS  91% HR  122   ROOM AIR PATIENT LEFT COMFORTABLY  SITTING/SUPINE O2 SATS  95% HR  115   Respiratory Therapist can do Home Oxygen Assessment if needed prior to discharge        PLAN :  Discharge Recommendations: None  Further Equipment Recommendations for Discharge: already has DME's     SUBJECTIVE:   Patient stated I feel better now.     OBJECTIVE DATA SUMMARY:   HISTORY:    Past Medical History:   Diagnosis Date    Asthma 10/11/2012    CAD (coronary artery disease) 4/3/2018    GERD (gastroesophageal reflux disease) 4/1/2009    HTN 4/1/2009    Menopausal state 4/1/2009    Serum lipid, elevated 4/1/2009     Past Surgical History:   Procedure Laterality Date    COLONOSCOPY N/A 8/31/2016    COLONOSCOPY performed [facility] Mery Contreras MD [facility] HX CHOLECYSTECTOMY  2012     Prior Level of Function/Home Situation: Independent community ambulator without assistive device and occasionally uses a single point cane or walking stick when ambulating outside.   Personal factors and/or comorbidities impacting plan of care:     Home Situation  Home Environment: Private residence  # Steps to Enter: 3  Rails to Enter: Yes  Hand Rails : Bilateral  One/Two Story Residence: Two story  # of Interior Steps: 13  Living Alone: No  Support Systems: Spouse/Significant Other/Partner  Patient Expects to be Discharged to[de-identified] Private residence  Current DME Used/Available at Home:  (walking stick)  Tub or Shower Type: Shower (built in seat)    EXAMINATION/PRESENTATION/DECISION MAKING:   Critical Behavior:  Neurologic State: Alert  Orientation Level: Oriented X4  Cognition: Appropriate decision making, Appropriate for age attention/concentration, Appropriate safety awareness, Follows commands  Safety/Judgement: Awareness of environment  Hearing: Auditory  Auditory Impairment: None    Range Of Motion:  AROM: Within functional limits           PROM: Within functional limits           Strength:    Strength: Within functional limits                    Tone & Sensation:                                  Coordination:  Coordination: Within functional limits  Vision:   Tracking: Able to track stimulus in all quadrants w/o difficulty  Diplopia: No  Acuity: Within Defined Limits  Corrective Lenses: Glasses  Functional Mobility:  Bed Mobility:  Rolling: Independent  Supine to Sit: Independent  Sit to Supine: Independent  Scooting: Independent  Transfers:  Sit to Stand: Independent  Stand to Sit: Independent  Stand Pivot Transfers: Independent     Bed to Chair: Independent              Balance:   Sitting: Intact  Standing: Intact; Without support  Ambulation/Gait Training:  Distance (ft): 100 Feet (ft) (ad jovany inside the contact precaution room.)     Ambulation - Level of Assistance: Independent     Gait Description (WDL): Exceptions to [facility]                                 Therapeutic Exercises:    Instructed patient to continue active range of motion exercise on both legs while up on chair or on bed.        Functional Measure:  Tinetti test:    Sitting Balance: 1  Arises: 2  Attempts to Rise: 2  Immediate Standing Balance: 2  Standing Balance: 2  Nudged: 2  Eyes Closed: 1  Turn 360 Degrees - Continuous/Discontinuous: 1  Turn 360 Degrees - Steady/Unsteady: 1  Sitting Down: 2  Balance Score: 16  Indication of Gait: 1  R Step Length/Height: 1  L Step Length/Height: 1  R Foot Clearance: 1  L Foot Clearance: 1  Step Symmetry: 1  Step Continuity: 1  Path: 2  Trunk: 2  Walking Time: 0  Gait Score: 11  Total Score: 27       Tinetti Test and G-code impairment scale:  Percentage of Impairment CH    0%   CI    1-19% CJ    20-39% CK    40-59% CL    60-79% CM    80-99% CN     100%   Tinetti  Score 0-28 28 23-27 17-22 12-16 6-11 1-5 0       Tinetti Tool Score Risk of Falls  <19 = High Fall Risk  19-24 = Moderate Fall Risk  25-28 = Low Fall Risk  Tinetti ME. Performance-Oriented Assessment of Mobility Problems in Elderly Patients. [address] 68; [id number]. (Scoring Description: PT Bulletin Feb. 10, 1993)    Older adults: Anastacia Martini et al, 2009; n = 1000 [facility] elderly evaluated with ABC, KEV, ADL, and IADL)  · Mean KEV score for males aged 69-68 years = 26.21(3.40)  · Mean KEV score for females age 69-68 years = 25.16(4.30)  · Mean KEV score for males over 80 years = 23.29(6.02)  · Mean KEV score for females over 80 years = 17.20(8.32)       G codes: In compliance with CMSs Claims Based Outcome Reporting, the following G-code set was chosen for this patient based on their primary functional limitation being treated: The outcome measure chosen to determine the severity of the functional limitation was the tinetti with a score of 27/28 which was correlated with the impairment scale.     ? Mobility - Walking and Moving Around:     - CURRENT STATUS: CI - 1%-19% impaired, limited or restricted    - GOAL STATUS: CI - 1%-19% impaired, limited or restricted    - D/C STATUS:  CI - 1%-19% impaired, limited or restricted        Physical Therapy Evaluation Charge Determination History Examination Presentation Decision-Making   LOW Complexity : Zero comorbidities / personal factors that will impact the outcome / POC LOW Complexity : 1-2 Standardized tests and measures addressing body structure, function, activity limitation and / or participation in recreation  LOW Complexity : Stable, uncomplicated  Other outcome measures tinetti  LOW       Based on the above components, the patient evaluation is determined to be of the following complexity level: LOW     Pain:  Pain Scale 1: Numeric (0 - 10)  Pain Intensity 1: 0     Activity Tolerance:   Good. Please refer to the flowsheet for vital signs taken during this treatment. After treatment:   [x]   Patient left in no apparent distress sitting up in chair  []   Patient left in no apparent distress in bed  [x]   Call bell left within reach  [x]   Nursing notified  [x]   Caregiver present  []   Bed alarm activated    COMMUNICATION/EDUCATION:   Communication/Collaboration:  [x]   Fall prevention education was provided and the patient/caregiver indicated understanding. [x]   Patient/family have participated as able and agree with findings and recommendations. []   Patient is unable to participate in plan of care at this time. Findings and recommendations were discussed with: Occupational Therapist, Registered Nurse and patient    Thank you for this referral.  Nola Amador, PT,WCC.    Time Calculation: 24 mins

## 2018-04-06 NOTE — ROUTINE PROCESS
Bedside and Verbal shift change report given to Burak Gordon RN (oncoming nurse) by Dashawn James RN (offgoing nurse). Report included the following information SBAR, Kardex, Intake/Output, MAR, Accordion, Recent Results, Cardiac Rhythm SR/ST and Alarm Parameters .

## 2018-04-06 NOTE — PROGRESS NOTES
SHIFT CHANGE:  Report received from Marina Colbert RN. SBAR, Kardex, Procedure Summary, Intake/Output, MAR, Recent Results, Med Rec Status and Cardiac Rhythm NST/ST were discussed. SHIFT SUMMARY:        END OF SHIFT REPORT:  2:00 PM  Bedside and Verbal shift change report given to Jared Andrews RN (oncoming nurse) by Dlima Mireles RN (offgoing nurse).  Report included the following information SBAR, Kardex, Procedure Summary, Intake/Output, MAR, Recent Results, Med Rec Status and Cardiac Rhythm ST.

## 2018-04-06 NOTE — CDMP QUERY
CMS considers documentation to be conflicting in nature when one condition is diagnosed as two different conditions by the same or different physicians. In this instance, the patient presented with \" Acute COPD exacerbation\"    On the initial cardiology consult  Dr. Dale Nelson documents that the patient has:     \"elevated Troponin are probably secondary to hypoxia, Type 2 MI\". ..... but the same condition is documented as NSTEMI  on hospitalist progress notes. Please clarify the patient's condition if possible:    => Type 2 NSTEMI POA  => NSTEMI POA  => ACS POA  => Other explanation of clinical findings   => Clinically Undetermined (no explanation for clinical findings)    The medical record reflects the following clinical findings, treatment, and risk factors. Risk Factors:  Severe CODP exac    Clinical Indicators:  4/3 Cardiology consult: \" elevated Troponin are probably secondary to hypoxia, Type 2 MI\"; 4/4 Cardiology Progress note: \"Concern for ACS\" 4/5 Progress note (Hospitalist): NSTEMI    Treatment: Cardiology consult; lovenox, plavix, monitor labs, VS, O2 as indicated    Please clarify and document your clinical opinion in the progress notes and discharge summary including the definitive and/or presumptive diagnosis, (suspected or probable), related to the above clinical findings. Please include clinical findings supporting your diagnosis.     Thank you,  Amna Bustamante, MSN, [place] [id number]

## 2018-04-07 PROBLEM — I25.10 CAD (CORONARY ARTERY DISEASE): Chronic | Status: ACTIVE | Noted: 2018-04-03

## 2018-04-07 PROBLEM — Z95.1 S/P CABG (CORONARY ARTERY BYPASS GRAFT): Chronic | Status: ACTIVE | Noted: 2018-04-03

## 2018-04-07 LAB
ANION GAP SERPL CALC-SCNC: 6 MMOL/L (ref 5–15)
BUN SERPL-MCNC: 21 MG/DL (ref 6–20)
BUN/CREAT SERPL: 33 (ref 12–20)
CALCIUM SERPL-MCNC: 8.6 MG/DL (ref 8.5–10.1)
CHLORIDE SERPL-SCNC: 104 MMOL/L (ref 97–108)
CO2 SERPL-SCNC: 29 MMOL/L (ref 21–32)
CREAT SERPL-MCNC: 0.63 MG/DL (ref 0.55–1.02)
D DIMER PPP FEU-MCNC: 1.74 MG/L FEU (ref 0–0.65)
ERYTHROCYTE [DISTWIDTH] IN BLOOD BY AUTOMATED COUNT: 15.4 % (ref 11.5–14.5)
GLUCOSE SERPL-MCNC: 119 MG/DL (ref 65–100)
HCT VFR BLD AUTO: 39.9 % (ref 35–47)
HGB BLD-MCNC: 13.1 G/DL (ref 11.5–16)
MAGNESIUM SERPL-MCNC: 1.9 MG/DL (ref 1.6–2.4)
MCH RBC QN AUTO: 29.4 PG (ref 26–34)
MCHC RBC AUTO-ENTMCNC: 32.8 G/DL (ref 30–36.5)
MCV RBC AUTO: 89.5 FL (ref 80–99)
NRBC # BLD: 0 K/UL (ref 0–0.01)
NRBC BLD-RTO: 0 PER 100 WBC
PHOSPHATE SERPL-MCNC: 3.4 MG/DL (ref 2.6–4.7)
PLATELET # BLD AUTO: 253 K/UL (ref 150–400)
PMV BLD AUTO: 10.6 FL (ref 8.9–12.9)
POTASSIUM SERPL-SCNC: 4.2 MMOL/L (ref 3.5–5.1)
RBC # BLD AUTO: 4.46 M/UL (ref 3.8–5.2)
SODIUM SERPL-SCNC: 139 MMOL/L (ref 136–145)
WBC # BLD AUTO: 12.1 K/UL (ref 3.6–11)

## 2018-04-07 PROCEDURE — 74011250636 HC RX REV CODE- 250/636: Performed by: INTERNAL MEDICINE

## 2018-04-07 PROCEDURE — 84100 ASSAY OF PHOSPHORUS: CPT | Performed by: INTERNAL MEDICINE

## 2018-04-07 PROCEDURE — 74011000250 HC RX REV CODE- 250: Performed by: INTERNAL MEDICINE

## 2018-04-07 PROCEDURE — 83735 ASSAY OF MAGNESIUM: CPT | Performed by: INTERNAL MEDICINE

## 2018-04-07 PROCEDURE — 36415 COLL VENOUS BLD VENIPUNCTURE: CPT | Performed by: INTERNAL MEDICINE

## 2018-04-07 PROCEDURE — 74011250637 HC RX REV CODE- 250/637: Performed by: INTERNAL MEDICINE

## 2018-04-07 PROCEDURE — 80048 BASIC METABOLIC PNL TOTAL CA: CPT | Performed by: INTERNAL MEDICINE

## 2018-04-07 PROCEDURE — 77010033678 HC OXYGEN DAILY

## 2018-04-07 PROCEDURE — 74011250636 HC RX REV CODE- 250/636: Performed by: NURSE PRACTITIONER

## 2018-04-07 PROCEDURE — 74011250637 HC RX REV CODE- 250/637: Performed by: NURSE PRACTITIONER

## 2018-04-07 PROCEDURE — 65660000000 HC RM CCU STEPDOWN

## 2018-04-07 PROCEDURE — 94640 AIRWAY INHALATION TREATMENT: CPT

## 2018-04-07 PROCEDURE — 85027 COMPLETE CBC AUTOMATED: CPT | Performed by: INTERNAL MEDICINE

## 2018-04-07 PROCEDURE — 85379 FIBRIN DEGRADATION QUANT: CPT | Performed by: SPECIALIST

## 2018-04-07 RX ORDER — METOPROLOL TARTRATE 25 MG/1
25 TABLET, FILM COATED ORAL 2 TIMES DAILY
Status: DISCONTINUED | OUTPATIENT
Start: 2018-04-07 | End: 2018-04-09 | Stop reason: HOSPADM

## 2018-04-07 RX ADMIN — Medication 5 ML: at 06:00

## 2018-04-07 RX ADMIN — METHYLPREDNISOLONE SODIUM SUCCINATE 40 MG: 40 INJECTION, POWDER, FOR SOLUTION INTRAMUSCULAR; INTRAVENOUS at 13:51

## 2018-04-07 RX ADMIN — METHYLPREDNISOLONE SODIUM SUCCINATE 40 MG: 40 INJECTION, POWDER, FOR SOLUTION INTRAMUSCULAR; INTRAVENOUS at 06:00

## 2018-04-07 RX ADMIN — BUDESONIDE 500 MCG: 0.5 INHALANT RESPIRATORY (INHALATION) at 09:47

## 2018-04-07 RX ADMIN — GUAIFENESIN 600 MG: 600 TABLET, EXTENDED RELEASE ORAL at 20:26

## 2018-04-07 RX ADMIN — DOCUSATE SODIUM 100 MG: 100 CAPSULE, LIQUID FILLED ORAL at 09:17

## 2018-04-07 RX ADMIN — METOPROLOL TARTRATE 25 MG: 25 TABLET ORAL at 20:26

## 2018-04-07 RX ADMIN — Medication 10 ML: at 22:23

## 2018-04-07 RX ADMIN — CLOPIDOGREL BISULFATE 75 MG: 75 TABLET ORAL at 09:17

## 2018-04-07 RX ADMIN — Medication 10 ML: at 13:52

## 2018-04-07 RX ADMIN — GUAIFENESIN 600 MG: 600 TABLET, EXTENDED RELEASE ORAL at 09:17

## 2018-04-07 RX ADMIN — METHYLPREDNISOLONE SODIUM SUCCINATE 40 MG: 40 INJECTION, POWDER, FOR SOLUTION INTRAMUSCULAR; INTRAVENOUS at 22:23

## 2018-04-07 RX ADMIN — BUDESONIDE 500 MCG: 0.5 INHALANT RESPIRATORY (INHALATION) at 20:34

## 2018-04-07 RX ADMIN — ASPIRIN 81 MG 81 MG: 81 TABLET ORAL at 10:52

## 2018-04-07 RX ADMIN — DOCUSATE SODIUM 100 MG: 100 CAPSULE, LIQUID FILLED ORAL at 18:48

## 2018-04-07 RX ADMIN — LISINOPRIL: 5 TABLET ORAL at 09:17

## 2018-04-07 RX ADMIN — ENOXAPARIN SODIUM 40 MG: 40 INJECTION SUBCUTANEOUS at 09:17

## 2018-04-07 RX ADMIN — ARFORMOTEROL TARTRATE 15 MCG: 15 SOLUTION RESPIRATORY (INHALATION) at 20:34

## 2018-04-07 RX ADMIN — LEVOFLOXACIN 750 MG: 5 INJECTION, SOLUTION INTRAVENOUS at 10:52

## 2018-04-07 RX ADMIN — PANTOPRAZOLE SODIUM 40 MG: 40 TABLET, DELAYED RELEASE ORAL at 09:17

## 2018-04-07 RX ADMIN — ROSUVASTATIN CALCIUM 20 MG: 10 TABLET, FILM COATED ORAL at 20:26

## 2018-04-07 RX ADMIN — ARFORMOTEROL TARTRATE 15 MCG: 15 SOLUTION RESPIRATORY (INHALATION) at 09:47

## 2018-04-07 NOTE — PROGRESS NOTES
[facility] [address]  [address] [facility], [address] [place] [address]  [phone]      Medical Progress Note      NAME: Dana Berg   :  1940  MRM:  [id number]    Date/Time: 2018  8:58 AM         Subjective:     Chief Complaint:  SOB: moderate, constant, worse with exertion, takes a while to recover; still insistent on not going home with oxygen    ROS:  (bold if positive, if negative)       Cough         SOB/MOSLEY        Tolerating PT  Tolerating Diet          Objective:       Vitals:          Last 24hrs VS reviewed since prior progress note.  Most recent are:    Visit Vitals    /88 (BP 1 Location: Left arm, BP Patient Position: At rest)    Pulse (!) 103    Temp 97.7 °F (36.5 °C)    Resp 22    Ht 5' 6\" (1.676 m)    Wt 60.4 kg (133 lb 2.5 oz)    SpO2 90%    Breastfeeding No    BMI 21.49 kg/m2     SpO2 Readings from Last 6 Encounters:   18 90%   02/15/18 95%   18 96%   18 93%   01/15/18 97%   11/10/17 99%    O2 Flow Rate (L/min): 1 l/min     Intake/Output Summary (Last 24 hours) at 18 0858  Last data filed at 18 0725   Gross per 24 hour   Intake              840 ml   Output              600 ml   Net              240 ml          Exam:     Physical Exam:    Gen:  Well-developed, well-nourished, in no acute distress  HEENT:  Pink conjunctivae, PERRL, hearing intact to voice, moist mucous membranes  Neck:  Supple, without masses, thyroid non-tender  Resp:  No accessory muscle use, few wheezes  Card:  No murmurs, normal S1, S2 without thrills, bruits or peripheral edemd  Abd:  Soft, non-tender, non-distended, normoactive bowel sounds are present, no palpable organomegaly and no detectable hernias  Lymph:  No cervical or inguinal adenopathy  Musc:  No cyanosis or clubbing  Skin:  No rashes or ulcers, skin turgor is good  Neuro:  Cranial nerves are grossly intact, no focal motor weakness, follows commands appropriately  Psych:  Good insight, oriented to person, place and time, alert       Telemetry reviewed:   sinus tach in the 110s    Medications Reviewed: (see below)    Lab Data Reviewed: (see below)    ______________________________________________________________________    Medications:     Current Facility-Administered Medications   Medication Dose Route Frequency    albuterol-ipratropium (DUO-NEB) 2.5 MG-0.5 MG/3 ML  3 mL Nebulization Q4H PRN    methylPREDNISolone (PF) (SOLU-MEDROL) injection 40 mg  40 mg IntraVENous Q8H    guaiFENesin ER (MUCINEX) tablet 600 mg  600 mg Oral Q12H    polyethylene glycol (MIRALAX) packet 17 g  17 g Oral DAILY PRN    rosuvastatin (CRESTOR) tablet 20 mg  20 mg Oral QHS    enoxaparin (LOVENOX) injection 40 mg  40 mg SubCUTAneous Q24H    acetaminophen (TYLENOL) tablet 650 mg  650 mg Oral Q6H PRN    clopidogrel (PLAVIX) tablet 75 mg  75 mg Oral DAILY    sodium chloride (NS) flush 5-10 mL  5-10 mL IntraVENous Q8H    sodium chloride (NS) flush 5-10 mL  5-10 mL IntraVENous PRN    budesonide (PULMICORT) 500 mcg/2 ml nebulizer suspension  500 mcg Nebulization BID RT    docusate sodium (COLACE) capsule 100 mg  100 mg Oral BID    levoFLOXacin (LEVAQUIN) 750 mg in D5W IVPB  750 mg IntraVENous Q24H    arformoterol (BROVANA) neb solution 15 mcg  15 mcg Nebulization BID RT    aspirin chewable tablet 81 mg  81 mg Oral EVERY OTHER DAY    pantoprazole (PROTONIX) tablet 40 mg  40 mg Oral DAILY    lisinopril/hydroCHLOROthiazide (PRINZIDE/ZESTORETIC) 10/12.5   Oral DAILY            Lab Review:     Recent Labs      04/07/18 0619 04/06/18   0120  04/05/18   0426   WBC  12.1*  14.9*  15.4*   HGB  13.1  12.0  11.9   HCT  39.9  37.1  37.4   PLT  253  232  232     Recent Labs      04/07/18 0619 04/06/18   0120  04/05/18   0426   NA  139  138  137   K  4.2  3.9  4.3   CL  104  104  106   CO2  29  27  27   GLU  119*  173*  163*   BUN  21*  22*  26*   CREA  0.63  0.62  0.72   CA  8.6  8.9  8.4*   MG  1.9  2.0  2.0   PHOS 3.4  2.8  2.5*     No results found for: GLUCPOC  No results for input(s): PH, PCO2, PO2, HCO3, FIO2 in the last 72 hours. No results for input(s): INR in the last 72 hours.     No lab exists for component: INREXT  No results found for: SDES  Lab Results   Component Value Date/Time    Culture result: NO GROWTH 4 DAYS 04/03/2018 01:12 PM            Assessment:     Principal Problem:    COPD exacerbation ([address] (4/3/2018)    Active Problems:    HTN (hypertension) (4/1/2009)      GERD (gastroesophageal reflux disease) (4/1/2009)      Acute respiratory failure with hypoxia (HCC) (4/3/2018)      Sinus tachycardia (4/3/2018)      CAD (coronary artery disease) (4/3/2018)           Plan:     Principal Problem:    COPD exacerbation ([address] (4/3/2018)   - continue nebs, steroids, levofloxacin   - positive for metapneumovirus   - hopes to go home tomorrow, refuses home oxygen    Active Problems:    HTN (hypertension) (4/1/2009)   - BP okay on home meds, follow      GERD (gastroesophageal reflux disease) (4/1/2009)   - continue PPI      Acute respiratory failure with hypoxia ([address] (4/3/2018)   - resolving, still hypoxic with any exertion, refuses O2      Sinus tachycardia (4/3/2018)   - worsened by respiratory process, has baseline tachycardia per her      CAD (coronary artery disease) (4/3/2018)   - stable, continue cardiac meds      Total time spent with patient: 25 minutes                  Care Plan discussed with: Patient and Nursing Staff    Discussed:  Code Status, Care Plan and D/C Planning    Prophylaxis:  Lovenox    Disposition:   PT, OT, RN           ___________________________________________________    Attending Physician: Desiderio Spatz, MD

## 2018-04-07 NOTE — ROUTINE PROCESS
1359  Responded to code yellow. Patient stated she walked back from the bathroom and had braced herself on the bedside table. Her water fell and she slipped in the water. Patient stated she fell on her right side. Denies injury and no visible injury noted. Patient got herself back in chair before staff got to room. 8695  Notified Dr. Nargis Rodriguez. No new orders at this time.

## 2018-04-07 NOTE — PROGRESS NOTES
Patient sitting in her chair and is noted with -125 and tachypnea,24-28 at rest. Call to Dr. Jonna Cline hospitalist on call to follow up on HR and respiratory status. Patient declined taking a beta blocker in the past and at this time Dr. Jonna Cline stated to call if she becomes symptomatic and he will talk with her about necessary options for medication regimen changes. 1920 Dr. Anastacia Lazaro returned call and ordered D-Dimer. New order entered. 1945 Patient in agreement for BB and new orders received from Dr. Jonna Cline for Metoprolol 25 mg 1 po Bid. Dr Anastacia Lazaro notified via [place] text of above orders.

## 2018-04-08 ENCOUNTER — APPOINTMENT (OUTPATIENT)
Dept: CT IMAGING | Age: 78
DRG: 190 | End: 2018-04-08
Attending: INTERNAL MEDICINE
Payer: MEDICARE

## 2018-04-08 LAB
BACTERIA SPEC CULT: NORMAL
ERYTHROCYTE [DISTWIDTH] IN BLOOD BY AUTOMATED COUNT: 15.2 % (ref 11.5–14.5)
HCT VFR BLD AUTO: 42.6 % (ref 35–47)
HGB BLD-MCNC: 13.7 G/DL (ref 11.5–16)
MAGNESIUM SERPL-MCNC: 1.9 MG/DL (ref 1.6–2.4)
MCH RBC QN AUTO: 28.8 PG (ref 26–34)
MCHC RBC AUTO-ENTMCNC: 32.2 G/DL (ref 30–36.5)
MCV RBC AUTO: 89.7 FL (ref 80–99)
NRBC # BLD: 0 K/UL (ref 0–0.01)
NRBC BLD-RTO: 0 PER 100 WBC
PHOSPHATE SERPL-MCNC: 3.1 MG/DL (ref 2.6–4.7)
PLATELET # BLD AUTO: 257 K/UL (ref 150–400)
PMV BLD AUTO: 10.8 FL (ref 8.9–12.9)
RBC # BLD AUTO: 4.75 M/UL (ref 3.8–5.2)
SERVICE CMNT-IMP: NORMAL
WBC # BLD AUTO: 11.1 K/UL (ref 3.6–11)

## 2018-04-08 PROCEDURE — 74011250637 HC RX REV CODE- 250/637: Performed by: INTERNAL MEDICINE

## 2018-04-08 PROCEDURE — 74011250637 HC RX REV CODE- 250/637: Performed by: NURSE PRACTITIONER

## 2018-04-08 PROCEDURE — 83735 ASSAY OF MAGNESIUM: CPT | Performed by: INTERNAL MEDICINE

## 2018-04-08 PROCEDURE — 94640 AIRWAY INHALATION TREATMENT: CPT

## 2018-04-08 PROCEDURE — 65660000000 HC RM CCU STEPDOWN

## 2018-04-08 PROCEDURE — 84100 ASSAY OF PHOSPHORUS: CPT | Performed by: INTERNAL MEDICINE

## 2018-04-08 PROCEDURE — 74011000250 HC RX REV CODE- 250: Performed by: INTERNAL MEDICINE

## 2018-04-08 PROCEDURE — 71275 CT ANGIOGRAPHY CHEST: CPT

## 2018-04-08 PROCEDURE — 77010033678 HC OXYGEN DAILY

## 2018-04-08 PROCEDURE — 74011250636 HC RX REV CODE- 250/636: Performed by: INTERNAL MEDICINE

## 2018-04-08 PROCEDURE — 85027 COMPLETE CBC AUTOMATED: CPT | Performed by: INTERNAL MEDICINE

## 2018-04-08 PROCEDURE — 74011250636 HC RX REV CODE- 250/636: Performed by: NURSE PRACTITIONER

## 2018-04-08 PROCEDURE — 74011636320 HC RX REV CODE- 636/320: Performed by: RADIOLOGY

## 2018-04-08 PROCEDURE — 36415 COLL VENOUS BLD VENIPUNCTURE: CPT | Performed by: INTERNAL MEDICINE

## 2018-04-08 RX ADMIN — ARFORMOTEROL TARTRATE 15 MCG: 15 SOLUTION RESPIRATORY (INHALATION) at 12:48

## 2018-04-08 RX ADMIN — PANTOPRAZOLE SODIUM 40 MG: 40 TABLET, DELAYED RELEASE ORAL at 08:24

## 2018-04-08 RX ADMIN — CLOPIDOGREL BISULFATE 75 MG: 75 TABLET ORAL at 08:25

## 2018-04-08 RX ADMIN — ENOXAPARIN SODIUM 40 MG: 40 INJECTION SUBCUTANEOUS at 08:25

## 2018-04-08 RX ADMIN — ROSUVASTATIN CALCIUM 20 MG: 10 TABLET, FILM COATED ORAL at 21:10

## 2018-04-08 RX ADMIN — GUAIFENESIN 600 MG: 600 TABLET, EXTENDED RELEASE ORAL at 08:25

## 2018-04-08 RX ADMIN — ARFORMOTEROL TARTRATE 15 MCG: 15 SOLUTION RESPIRATORY (INHALATION) at 20:17

## 2018-04-08 RX ADMIN — LISINOPRIL: 5 TABLET ORAL at 08:24

## 2018-04-08 RX ADMIN — METOPROLOL TARTRATE 25 MG: 25 TABLET ORAL at 08:25

## 2018-04-08 RX ADMIN — METOPROLOL TARTRATE 25 MG: 25 TABLET ORAL at 17:43

## 2018-04-08 RX ADMIN — Medication 10 ML: at 21:11

## 2018-04-08 RX ADMIN — METHYLPREDNISOLONE SODIUM SUCCINATE 40 MG: 40 INJECTION, POWDER, FOR SOLUTION INTRAMUSCULAR; INTRAVENOUS at 14:27

## 2018-04-08 RX ADMIN — Medication 10 ML: at 17:43

## 2018-04-08 RX ADMIN — BUDESONIDE 500 MCG: 0.5 INHALANT RESPIRATORY (INHALATION) at 12:49

## 2018-04-08 RX ADMIN — BUDESONIDE 500 MCG: 0.5 INHALANT RESPIRATORY (INHALATION) at 20:17

## 2018-04-08 RX ADMIN — METHYLPREDNISOLONE SODIUM SUCCINATE 40 MG: 40 INJECTION, POWDER, FOR SOLUTION INTRAMUSCULAR; INTRAVENOUS at 21:10

## 2018-04-08 RX ADMIN — IOPAMIDOL 100 ML: 755 INJECTION, SOLUTION INTRAVENOUS at 09:00

## 2018-04-08 RX ADMIN — POLYETHYLENE GLYCOL (3350) 17 G: 17 POWDER, FOR SOLUTION ORAL at 12:25

## 2018-04-08 RX ADMIN — DOCUSATE SODIUM 100 MG: 100 CAPSULE, LIQUID FILLED ORAL at 17:43

## 2018-04-08 RX ADMIN — METHYLPREDNISOLONE SODIUM SUCCINATE 40 MG: 40 INJECTION, POWDER, FOR SOLUTION INTRAMUSCULAR; INTRAVENOUS at 06:56

## 2018-04-08 RX ADMIN — DOCUSATE SODIUM 100 MG: 100 CAPSULE, LIQUID FILLED ORAL at 08:24

## 2018-04-08 RX ADMIN — GUAIFENESIN 600 MG: 600 TABLET, EXTENDED RELEASE ORAL at 21:10

## 2018-04-08 RX ADMIN — Medication 10 ML: at 06:56

## 2018-04-08 NOTE — PROGRESS NOTES
Prosper Azevedo [facility] [address], [place], [address] [facility]  [phone]      Medical Progress Note      NAME: Kenn Hwang   :  1940  MRM:  [id number]    Date/Time: 2018  8:35 AM          Subjective:     Chief Complaint:  SOB: moderate, constant, worse with exertion, takes a while to recover, was worse last night, better this AM; I think I may have persuaded her to go home with oxygen, she is at least considering it    ROS:  (bold if positive, if negative)       Cough         SOB/MOSLEY        Tolerating PT  Tolerating Diet          Objective:       Vitals:          Last 24hrs VS reviewed since prior progress note.  Most recent are:    Visit Vitals    /78 (BP 1 Location: Left arm, BP Patient Position: At rest)    Pulse 100    Temp 98.1 °F (36.7 °C)    Resp 25    Ht 5' 6\" (1.676 m)    Wt 60.4 kg (133 lb 2.5 oz)    SpO2 90%    Breastfeeding No    BMI 21.49 kg/m2     SpO2 Readings from Last 6 Encounters:   18 90%   02/15/18 95%   18 96%   18 93%   01/15/18 97%   11/10/17 99%    O2 Flow Rate (L/min): 1 l/min       Intake/Output Summary (Last 24 hours) at 18 0835  Last data filed at 18 0522   Gross per 24 hour   Intake             1700 ml   Output             1000 ml   Net              700 ml          Exam:     Physical Exam:    Gen:  Well-developed, well-nourished, in no acute distress  HEENT:  Pink conjunctivae, PERRL, hearing intact to voice, moist mucous membranes  Neck:  Supple, without masses, thyroid non-tender  Resp:  No accessory muscle use, few wheezes  Card:  No murmurs, normal S1, S2 without thrills, bruits or peripheral edemd  Abd:  Soft, non-tender, non-distended, normoactive bowel sounds are present, no palpable organomegaly and no detectable hernias  Lymph:  No cervical or inguinal adenopathy  Musc:  No cyanosis or clubbing  Skin:  No rashes or ulcers, skin turgor is good  Neuro:  Cranial nerves are grossly intact, no focal motor weakness, follows commands appropriately  Psych:  Good insight, oriented to person, place and time, alert       Telemetry reviewed:   sinus tach in the 110s    Medications Reviewed: (see below)    Lab Data Reviewed: (see below)    ______________________________________________________________________    Medications:     Current Facility-Administered Medications   Medication Dose Route Frequency    iopamidol (ISOVUE-370) 76 % injection 100 mL  100 mL IntraVENous RAD ONCE    metoprolol tartrate (LOPRESSOR) tablet 25 mg  25 mg Oral BID    albuterol-ipratropium (DUO-NEB) 2.5 MG-0.5 MG/3 ML  3 mL Nebulization Q4H PRN    methylPREDNISolone (PF) (SOLU-MEDROL) injection 40 mg  40 mg IntraVENous Q8H    guaiFENesin ER (MUCINEX) tablet 600 mg  600 mg Oral Q12H    polyethylene glycol (MIRALAX) packet 17 g  17 g Oral DAILY PRN    rosuvastatin (CRESTOR) tablet 20 mg  20 mg Oral QHS    enoxaparin (LOVENOX) injection 40 mg  40 mg SubCUTAneous Q24H    acetaminophen (TYLENOL) tablet 650 mg  650 mg Oral Q6H PRN    clopidogrel (PLAVIX) tablet 75 mg  75 mg Oral DAILY    sodium chloride (NS) flush 5-10 mL  5-10 mL IntraVENous Q8H    sodium chloride (NS) flush 5-10 mL  5-10 mL IntraVENous PRN    budesonide (PULMICORT) 500 mcg/2 ml nebulizer suspension  500 mcg Nebulization BID RT    docusate sodium (COLACE) capsule 100 mg  100 mg Oral BID    arformoterol (BROVANA) neb solution 15 mcg  15 mcg Nebulization BID RT    aspirin chewable tablet 81 mg  81 mg Oral EVERY OTHER DAY    pantoprazole (PROTONIX) tablet 40 mg  40 mg Oral DAILY    lisinopril/hydroCHLOROthiazide (PRINZIDE/ZESTORETIC) 10/12.5   Oral DAILY            Lab Review:     Recent Labs      04/08/18   0219  04/07/18   0619  04/06/18   0120   WBC  11.1*  12.1*  14.9*   HGB  13.7  13.1  12.0   HCT  42.6  39.9  37.1   PLT  257  253  232     Recent Labs      04/08/18   0219  04/07/18   0619  04/06/18   0120   NA   --   139  138   K   --   4.2  3.9   CL   -- 104  104   CO2   --   29  27   GLU   --   119*  173*   BUN   --   21*  22*   CREA   --   0.63  0.62   CA   --   8.6  8.9   MG  1.9  1.9  2.0   PHOS  3.1  3.4  2.8     No results found for: GLUCPOC  No results for input(s): PH, PCO2, PO2, HCO3, FIO2 in the last 72 hours. No results for input(s): INR in the last 72 hours.     No lab exists for component: INREXT, INREXT  No results found for: SDES  Lab Results   Component Value Date/Time    Culture result: NO GROWTH 5 DAYS 04/03/2018 01:12 PM            Assessment:     Principal Problem:    COPD exacerbation ([address] (4/3/2018)    Active Problems:    HTN (hypertension) (4/1/2009)      GERD (gastroesophageal reflux disease) (4/1/2009)      Acute respiratory failure with hypoxia (HCC) (4/3/2018)      Sinus tachycardia (4/3/2018)      CAD (coronary artery disease) (4/3/2018)           Plan:     Principal Problem:    COPD exacerbation ([address] (4/3/2018)   - continue nebs, steroids, levofloxacin   - positive for metapneumovirus   - hopes to go home tomorrow, refuses home oxygen    Active Problems:    HTN (hypertension) (4/1/2009)   - BP okay on home meds, follow      GERD (gastroesophageal reflux disease) (4/1/2009)   - continue PPI      Acute respiratory failure with hypoxia ([address] (4/3/2018)   - resolving, still hypoxic at rest 88% when I examined her off oxygen   - she is reconsidering home oxygen      Sinus tachycardia (4/3/2018)   - worsened by respiratory process, has baseline tachycardia per her   - D-dimer ordered by Dr. Mary Davis yesterday and is positive   - will get CTA of chest today to rule out PE      CAD (coronary artery disease) (4/3/2018)   - stable, continue cardiac meds      Total time spent with patient: 25 minutes                  Care Plan discussed with: Patient and Nursing Staff    Discussed:  Code Status, Care Plan and D/C Planning    Prophylaxis:  Lovenox    Disposition:   PT, OT, RN           ___________________________________________________    Attending Physician: Negro Troncoso MD

## 2018-04-08 NOTE — PROGRESS NOTES
Problem: Falls - Risk of  Goal: *Absence of Falls  Document Marc Fall Risk and appropriate interventions in the flowsheet.    Outcome: Progressing Towards Goal  Fall Risk Interventions:  Mobility Interventions: Assess mobility with egress test, Bed/chair exit alarm, Patient to call before getting OOB         Medication Interventions: Assess postural VS orthostatic hypotension, Bed/chair exit alarm, Patient to call before getting OOB    Elimination Interventions: Bed/chair exit alarm, Call light in reach, Toilet paper/wipes in reach    History of Falls Interventions: Bed/chair exit alarm, Room close to nurse's station

## 2018-04-08 NOTE — PROGRESS NOTES
2000  Bedside and Verbal shift change report given to Stephani Alvarado RN (oncoming nurse) by Ramandeep RAMIREZ (offgoing nurse). Report included the following information SBAR, Kardex, Procedure Summary, Intake/Output, MAR, Accordion and Recent Results. Received patient in bed without any complaints. 2040  Patient need to be on 2L NC oxygen after the neb treatment as O2 saturation went down to 85. Visit Vitals    /78 (BP 1 Location: Left arm, BP Patient Position: At rest)    Pulse 100    Temp 98.1 °F (36.7 °C)    Resp 25    Ht 5' 6\" (1.676 m)    Wt 60.4 kg (133 lb 2.5 oz)    SpO2 90%    Breastfeeding No    BMI 21.49 kg/m2     Bedside and Verbal shift change report given to Han Gamboa (oncoming nurse) by Stephani Alvarado RN (offgoing nurse). Report included the following information SBAR, Kardex, Procedure Summary, Intake/Output, MAR, Accordion and Recent Results.

## 2018-04-09 VITALS
OXYGEN SATURATION: 90 % | SYSTOLIC BLOOD PRESSURE: 140 MMHG | HEART RATE: 103 BPM | HEIGHT: 66 IN | RESPIRATION RATE: 24 BRPM | DIASTOLIC BLOOD PRESSURE: 75 MMHG | BODY MASS INDEX: 19.84 KG/M2 | TEMPERATURE: 97.1 F | WEIGHT: 123.46 LBS

## 2018-04-09 LAB
ERYTHROCYTE [DISTWIDTH] IN BLOOD BY AUTOMATED COUNT: 15.1 % (ref 11.5–14.5)
HCT VFR BLD AUTO: 44.1 % (ref 35–47)
HGB BLD-MCNC: 13.9 G/DL (ref 11.5–16)
MAGNESIUM SERPL-MCNC: 2.1 MG/DL (ref 1.6–2.4)
MCH RBC QN AUTO: 28.4 PG (ref 26–34)
MCHC RBC AUTO-ENTMCNC: 31.5 G/DL (ref 30–36.5)
MCV RBC AUTO: 90 FL (ref 80–99)
NRBC # BLD: 0 K/UL (ref 0–0.01)
NRBC BLD-RTO: 0 PER 100 WBC
O+P SPEC MICRO: NORMAL
O+P STL CONC: NORMAL
PHOSPHATE SERPL-MCNC: 4.4 MG/DL (ref 2.6–4.7)
PLATELET # BLD AUTO: 269 K/UL (ref 150–400)
PMV BLD AUTO: 10.3 FL (ref 8.9–12.9)
RBC # BLD AUTO: 4.9 M/UL (ref 3.8–5.2)
SPECIMEN SOURCE: NORMAL
WBC # BLD AUTO: 12.6 K/UL (ref 3.6–11)

## 2018-04-09 PROCEDURE — 74011250636 HC RX REV CODE- 250/636: Performed by: INTERNAL MEDICINE

## 2018-04-09 PROCEDURE — 97164 PT RE-EVAL EST PLAN CARE: CPT

## 2018-04-09 PROCEDURE — 83735 ASSAY OF MAGNESIUM: CPT | Performed by: INTERNAL MEDICINE

## 2018-04-09 PROCEDURE — 74011000250 HC RX REV CODE- 250: Performed by: INTERNAL MEDICINE

## 2018-04-09 PROCEDURE — 97116 GAIT TRAINING THERAPY: CPT

## 2018-04-09 PROCEDURE — 74011250637 HC RX REV CODE- 250/637: Performed by: NURSE PRACTITIONER

## 2018-04-09 PROCEDURE — 77010033678 HC OXYGEN DAILY

## 2018-04-09 PROCEDURE — 77030027138 HC INCENT SPIROMETER -A

## 2018-04-09 PROCEDURE — 84100 ASSAY OF PHOSPHORUS: CPT | Performed by: INTERNAL MEDICINE

## 2018-04-09 PROCEDURE — 74011250636 HC RX REV CODE- 250/636: Performed by: NURSE PRACTITIONER

## 2018-04-09 PROCEDURE — 36415 COLL VENOUS BLD VENIPUNCTURE: CPT | Performed by: INTERNAL MEDICINE

## 2018-04-09 PROCEDURE — 94640 AIRWAY INHALATION TREATMENT: CPT

## 2018-04-09 PROCEDURE — 74011250637 HC RX REV CODE- 250/637: Performed by: INTERNAL MEDICINE

## 2018-04-09 PROCEDURE — 77030038269 HC DRN EXT URIN PURWCK BARD -A

## 2018-04-09 PROCEDURE — 85027 COMPLETE CBC AUTOMATED: CPT | Performed by: INTERNAL MEDICINE

## 2018-04-09 RX ORDER — PREDNISONE 20 MG/1
TABLET ORAL
Qty: 30 TAB | Refills: 0 | Status: SHIPPED | OUTPATIENT
Start: 2018-04-09 | End: 2018-07-25 | Stop reason: ALTCHOICE

## 2018-04-09 RX ORDER — CLOPIDOGREL BISULFATE 75 MG/1
75 TABLET ORAL DAILY
Qty: 30 TAB | Refills: 0 | Status: SHIPPED | OUTPATIENT
Start: 2018-04-10 | End: 2018-07-25 | Stop reason: ALTCHOICE

## 2018-04-09 RX ORDER — METOPROLOL TARTRATE 25 MG/1
25 TABLET, FILM COATED ORAL 2 TIMES DAILY
Qty: 60 TAB | Refills: 0 | Status: SHIPPED | OUTPATIENT
Start: 2018-04-09 | End: 2018-04-12

## 2018-04-09 RX ORDER — GUAIFENESIN 600 MG/1
600 TABLET, EXTENDED RELEASE ORAL EVERY 12 HOURS
Qty: 60 TAB | Refills: 0 | Status: SHIPPED | OUTPATIENT
Start: 2018-04-09 | End: 2018-07-25 | Stop reason: ALTCHOICE

## 2018-04-09 RX ADMIN — BUDESONIDE 500 MCG: 0.5 INHALANT RESPIRATORY (INHALATION) at 07:43

## 2018-04-09 RX ADMIN — PANTOPRAZOLE SODIUM 40 MG: 40 TABLET, DELAYED RELEASE ORAL at 09:39

## 2018-04-09 RX ADMIN — GUAIFENESIN 600 MG: 600 TABLET, EXTENDED RELEASE ORAL at 09:39

## 2018-04-09 RX ADMIN — Medication 10 ML: at 05:55

## 2018-04-09 RX ADMIN — CLOPIDOGREL BISULFATE 75 MG: 75 TABLET ORAL at 09:39

## 2018-04-09 RX ADMIN — DOCUSATE SODIUM 100 MG: 100 CAPSULE, LIQUID FILLED ORAL at 09:39

## 2018-04-09 RX ADMIN — ENOXAPARIN SODIUM 40 MG: 40 INJECTION SUBCUTANEOUS at 09:38

## 2018-04-09 RX ADMIN — METHYLPREDNISOLONE SODIUM SUCCINATE 40 MG: 40 INJECTION, POWDER, FOR SOLUTION INTRAMUSCULAR; INTRAVENOUS at 05:55

## 2018-04-09 RX ADMIN — LISINOPRIL: 5 TABLET ORAL at 09:39

## 2018-04-09 RX ADMIN — METOPROLOL TARTRATE 25 MG: 25 TABLET ORAL at 09:39

## 2018-04-09 RX ADMIN — ARFORMOTEROL TARTRATE 15 MCG: 15 SOLUTION RESPIRATORY (INHALATION) at 07:43

## 2018-04-09 RX ADMIN — ASPIRIN 81 MG 81 MG: 81 TABLET ORAL at 09:39

## 2018-04-09 NOTE — PROGRESS NOTES
1930  Bedside and Verbal shift change report given to Jose Roman (oncoming nurse) by Sabina Sofia (offgoing nurse). Report included the following information SBAR, Kardex, Procedure Summary, Intake/Output, MAR, Accordion and Recent Results. Received patient in bed without any complaints. Visit Vitals    /71    Pulse 85    Temp 97.9 °F (36.6 °C)    Resp 22    Ht 5' 6\" (1.676 m)    Wt 56 kg (123 lb 7.3 oz)    SpO2 96%    Breastfeeding No    BMI 19.93 kg/m2       Bedside and Verbal shift change report given to Domonique RAMIREZ (oncoming nurse) by Wyatt Carrion RN (offgoing nurse). Report included the following information SBAR, Kardex, Procedure Summary, Intake/Output, MAR, Accordion and Recent Results.

## 2018-04-09 NOTE — PROGRESS NOTES
Nutrition:    Pt seen for LOS. Noted discharge orders. Pt denies weight change. Chart indicates 7 lbs weight loss over last 2 months. Pt appetite is good now, eating well. Intakes >75%. No GI complaints now, states she had some issues that were \"taken care of. \" No diet education needed, no nutritional concerns at this time. Essie Suh, [address]      Patient Vitals for the past 168 hrs:   % Diet Eaten   04/09/18 0941 75 %   04/07/18 1718 75 %   04/07/18 1355 75 %   04/07/18 0948 75 %   04/06/18 0920 50 %   04/05/18 0932 50 %   04/04/18 1045 100 %         Wt Readings from Last 30 Encounters:   04/09/18 56 kg (123 lb 7.3 oz)   02/09/18 59 kg (130 lb)   01/15/18 57.5 kg (126 lb 12.8 oz)   11/10/17 57.6 kg (127 lb)   06/06/17 58 kg (127 lb 12.8 oz)   02/28/17 62.1 kg (137 lb)   02/02/17 62.1 kg (137 lb)   11/03/16 60.8 kg (134 lb)   08/31/16 61.7 kg (136 lb)   07/27/16 60.4 kg (133 lb 3.2 oz)   06/17/16 62 kg (136 lb 9.6 oz)   05/09/16 62.6 kg (138 lb)   04/26/16 62.6 kg (138 lb)   03/21/16 64 kg (141 lb)   11/09/15 64.4 kg (142 lb)   04/21/15 64.4 kg (142 lb)   09/30/14 64.7 kg (142 lb 9.6 oz)   04/15/14 67.4 kg (148 lb 9.6 oz)   04/11/14 67 kg (147 lb 9.6 oz)   12/03/13 67.3 kg (148 lb 6.4 oz)   05/13/13 65 kg (143 lb 6.4 oz)   05/11/13 63.5 kg (140 lb)   04/09/13 64 kg (141 lb 3.2 oz)   04/01/13 62.9 kg (138 lb 9.6 oz)   11/07/12 65 kg (143 lb 3.2 oz)   10/11/12 64.4 kg (142 lb)   03/19/12 65 kg (143 lb 6.4 oz)   09/19/11 65.3 kg (144 lb)   08/26/11 64 kg (141 lb)   01/28/11 64.8 kg (142 lb 12.8 oz)

## 2018-04-09 NOTE — PROGRESS NOTES
physical Therapy reEVALUATION/DISCHARGE  Patient: Cesar Wynne (71 y.o. female)  Date: 4/9/2018  Primary Diagnosis: COPD exacerbation ([address]             ASSESSMENT :  Based on the objective data described above, the patient presents with independent with ambulation without assistive device and independent with all functional mobility. Received re evaluation order patient still at her baseline level of function. Reviewed purse lip breathing and energy conservation verbalized understanding. Reviewed all safety precaution and home exercise program with the patient, verbalized understanding, clear to go home per Physical Therapy perspective. Skilled physical therapy is not indicated at this time. Documentation for home O2:     ROOM AIR    AT REST   O2 SATS  99% HR  101   ROOM AIR WITH ACTIVITY 02 SATS  93% HR  105   ROOM AIR AFTER ACTIVITY O2 SATS  89% HR  110   ROOM AIR PATIENT LEFT COMFORTABLY  SITTING/SUPINE 02 SATS  96% HR  110            PLAN :  Discharge Recommendations: None  Further Equipment Recommendations for Discharge: already has DME's     SUBJECTIVE:   Patient stated Ready to go home.     OBJECTIVE DATA SUMMARY:   HISTORY:    Past Medical History:   Diagnosis Date    Asthma 10/11/2012    CAD (coronary artery disease) 4/3/2018    GERD (gastroesophageal reflux disease) 4/1/2009    HTN 4/1/2009    Menopausal state 4/1/2009    Serum lipid, elevated 4/1/2009     Past Surgical History:   Procedure Laterality Date    COLONOSCOPY N/A 8/31/2016    COLONOSCOPY performed by Tato Victoria MD at [address] [place] [address] CHOLECYSTECTOMY  2012     Prior Level of Function/Home Situation: Independent community ambulator without assistive device.   Personal factors and/or comorbidities impacting plan of care:     Home Situation  Home Environment: Private residence  # Steps to Enter: 3  Rails to Enter: Yes  Hand Rails : Bilateral  One/Two Story Residence: Two story  # of Interior Steps: 13  Living Alone: No  Support Systems: Spouse/Significant Other/Partner  Patient Expects to be Discharged to[de-identified] Private residence  Current DME Used/Available at Home:  (walking stick)  Tub or Shower Type: Shower (built in seat)    EXAMINATION/PRESENTATION/DECISION MAKING:   Critical Behavior:  Neurologic State: Alert, Appropriate for age  Orientation Level: Oriented X4  Cognition: Appropriate for age attention/concentration  Safety/Judgement: Awareness of environment  Hearing: Auditory  Auditory Impairment: None    Range Of Motion:  AROM: Within functional limits           PROM: Within functional limits           Strength:    Strength: Within functional limits                    Tone & Sensation:                                  Coordination:  Coordination: Within functional limits  Vision:      Functional Mobility:  Bed Mobility:  Rolling:  (already up on the chair)           Transfers:  Sit to Stand: Independent  Stand to Sit: Independent  Stand Pivot Transfers: Independent     Bed to Chair: Independent              Balance:   Sitting: Intact  Standing: Intact; Without support  Ambulation/Gait Training:  Distance (ft): 100 Feet (ft) (ad jovany inside the isolation room)     Ambulation - Level of Assistance: Independent     Gait Description (WDL): Within defined limits                                Therapeutic Exercises:    Instructed patient to continue active range of motion exercise on both legs while up on chair or on bed.        Functional Measure:  Tinetti test:    Sitting Balance: 1  Arises: 2  Attempts to Rise: 2  Immediate Standing Balance: 2  Standing Balance: 2  Nudged: 2  Eyes Closed: 1  Turn 360 Degrees - Continuous/Discontinuous: 1  Turn 360 Degrees - Steady/Unsteady: 1  Sitting Down: 2  Balance Score: 16  Indication of Gait: 1  R Step Length/Height: 1  L Step Length/Height: 1  R Foot Clearance: 1  L Foot Clearance: 1  Step Symmetry: 1  Step Continuity: 1  Path: 2  Trunk: 2  Walking Time: 1  Gait Score: 12  Total Score: 28       Tinetti Test and G-code impairment scale:  Percentage of Impairment CH    0%   CI    1-19% CJ    20-39% CK    40-59% CL    60-79% CM    80-99% CN     100%   Tinetti  Score 0-28 28 23-27 17-22 12-16 6-11 1-5 0       Tinetti Tool Score Risk of Falls  <19 = High Fall Risk  19-24 = Moderate Fall Risk  25-28 = Low Fall Risk  Tinetti ME. Performance-Oriented Assessment of Mobility Problems in Elderly Patients. [facility]; [id number]. (Scoring Description: PT Bulletin Feb. 10, 1993)    Older adults: Dashawn Palacio et al, 2009; n = 1000 [facility] elderly evaluated with ABC, KEV, ADL, and IADL)  · Mean KEV score for males aged 69-68 years = 26.21(3.40)  · Mean KEV score for females age 69-68 years = 25.16(4.30)  · Mean KEV score for males over 80 years = 23.29(6.02)  · Mean KEV score for females over 80 years = 17.20(8.32)       G codes: In compliance with CMSs Claims Based Outcome Reporting, the following G-code set was chosen for this patient based on their primary functional limitation being treated: The outcome measure chosen to determine the severity of the functional limitation was the tinetti with a score of 28/28 which was correlated with the impairment scale.     ? Mobility - Walking and Moving Around:     - CURRENT STATUS: CH - 0% impaired, limited or restricted    - GOAL STATUS: CH - 0% impaired, limited or restricted    - D/C STATUS:  CH - 0% impaired, limited or restricted        Physical Therapy Evaluation Charge Determination   History Examination Presentation Decision-Making   LOW Complexity : Zero comorbidities / personal factors that will impact the outcome / POC LOW Complexity : 1-2 Standardized tests and measures addressing body structure, function, activity limitation and / or participation in recreation  LOW Complexity : Stable, uncomplicated  Other outcome measures tinetti  LOW       Based on the above components, the patient evaluation is determined to be of the following complexity level: LOW     Pain:  Pain Scale 1: Numeric (0 - 10)  Pain Intensity 1: 0     Activity Tolerance:   Good. Please refer to the flowsheet for vital signs taken during this treatment. After treatment:   [x]   Patient left in no apparent distress sitting up in chair  []   Patient left in no apparent distress in bed  [x]   Call bell left within reach  [x]   Nursing notified  []   Caregiver present  []   Bed alarm activated    COMMUNICATION/EDUCATION:   Communication/Collaboration:  [x]   Fall prevention education was provided and the patient/caregiver indicated understanding. [x]   Patient/family have participated as able and agree with findings and recommendations. []   Patient is unable to participate in plan of care at this time.   Findings and recommendations were discussed with: Registered Nurse,  and patient    Thank you for this referral.  Ki Mayer PT,WCC   Time Calculation: 25 mins

## 2018-04-09 NOTE — DISCHARGE INSTRUCTIONS
HOSPITALIST DISCHARGE INSTRUCTIONS  NAME: Esau Daly   :  1940   MRN:  [id number]     Date/Time:  2018 2:26 PM    ADMIT DATE: 4/3/2018     DISCHARGE DATE: 2018     DISCHARGE DIAGNOSIS:  Acute COPD    MEDICATIONS:  · It is important that you take the medication exactly as they are prescribed. · Keep your medication in the bottles provided by the pharmacist and keep a list of the medication names, dosages, and times to be taken in your wallet. · Do not take other medications without consulting your doctor. Pain Management: per above medications    What to do at Home    Recommended diet:  Cardiac Diet    Recommended activity: Activity as tolerated    If you experience any of the following symptoms then please call your primary care physician or return to the emergency room if you cannot get hold of your doctor:  Fever, chills, nausea, vomiting, diarrhea, change in mentation, falling, bleeding, shortness of breath    Follow Up: Follow-up Information     Follow up With Details Comments Contact Info    Jw Arce MD On 2018 11 am [address] [place], [place] [address]  [address] [facility], [place]   [address] [facility]  [address]  Internal [facility] Sandre [place] [address] [facility]  [phone]              Information obtained by :  I understand that if any problems occur once I am at home I am to contact my physician. I understand and acknowledge receipt of the instructions indicated above.                                                                                                                                            Physician's or R.N.'s Signature                                                                  Date/Time                                                                                                                                              Patient or Representative Signature Date/Time

## 2018-04-09 NOTE — PROGRESS NOTES
Problem: Falls - Risk of  Goal: *Absence of Falls  Document Marc Fall Risk and appropriate interventions in the flowsheet.    Outcome: Progressing Towards Goal  Fall Risk Interventions:  Mobility Interventions: Assess mobility with egress test, Bed/chair exit alarm, Patient to call before getting OOB         Medication Interventions: Assess postural VS orthostatic hypotension, Bed/chair exit alarm, Patient to call before getting OOB    Elimination Interventions: Bed/chair exit alarm, Call light in reach    History of Falls Interventions: Bed/chair exit alarm, Consult care management for discharge planning, Door open when patient unattended

## 2018-04-09 NOTE — PROGRESS NOTES
PT does not recommend any home health and patient does not qualify for 02. Pt refused H2H - COPD. I made PCP appointment for April 12 @ 2:15, information is on the pt's AVS. AUDREY Montiel notified of hospital discharge. Pt is ready to be discharged from cm standpoint.  Thanks Nirav Norton, MSW

## 2018-04-09 NOTE — PROGRESS NOTES
Charge nurse chart review to verify contact order for positive metapneumovirus. ,DirectAddress_Unknown ,DirectAddress_Unknown,[email or web address]

## 2018-04-09 NOTE — PROGRESS NOTES
Discharge instructions, including information on new medications, were reviewed with patient. All questions were answered. IV and heart monitor were removed. Patient received a copy of her discharge papers and 1 prescription and 3 other prescriptions were sent electronically to her pharmacy by the physician. Patient refused [facility] home health visit for her COPD. Patient will be discharged home with her .

## 2018-04-09 NOTE — PROGRESS NOTES
Prosper Azevedo gabriela Kanaranzi 81  [address] [facility], [address] [place] [address]  [phone]      Medical Progress Note      NAME: Alphonso Mccauley   :  1940  MRM:  [id number]    Date/Time: 2018  8:11 AM          Subjective:     Chief Complaint:  SOB: remains moderate, constant, worse with exertion, takes a while to recover, better overall; agreeable to home oxygen if she needs it    ROS:  (bold if positive, if negative)       Cough         SOB/MOSLEY        Tolerating PT  Tolerating Diet          Objective:       Vitals:          Last 24hrs VS reviewed since prior progress note.  Most recent are:    Visit Vitals    /71    Pulse 85    Temp 97.9 °F (36.6 °C)    Resp 22    Ht 5' 6\" (1.676 m)    Wt 56 kg (123 lb 7.3 oz)    SpO2 96%    Breastfeeding No    BMI 19.93 kg/m2     SpO2 Readings from Last 6 Encounters:   18 96%   02/15/18 95%   18 96%   18 93%   01/15/18 97%   11/10/17 99%    O2 Flow Rate (L/min): 2 l/min       Intake/Output Summary (Last 24 hours) at 18 0810  Last data filed at 18 0143   Gross per 24 hour   Intake              260 ml   Output             1200 ml   Net             -940 ml          Exam:     Physical Exam:    Gen:  Well-developed, well-nourished, in no acute distress  HEENT:  Pink conjunctivae, PERRL, hearing intact to voice, moist mucous membranes  Neck:  Supple, without masses, thyroid non-tender  Resp:  No accessory muscle use, few wheezes  Card:  No murmurs, normal S1, S2 without thrills, bruits or peripheral edemd  Abd:  Soft, non-tender, non-distended, normoactive bowel sounds are present, no palpable organomegaly and no detectable hernias  Lymph:  No cervical or inguinal adenopathy  Musc:  No cyanosis or clubbing  Skin:  No rashes or ulcers, skin turgor is good  Neuro:  Cranial nerves are grossly intact, no focal motor weakness, follows commands appropriately  Psych:  Good insight, oriented to person, place and time, alert       Telemetry reviewed:   sinus tach in the 110s    Medications Reviewed: (see below)    Lab Data Reviewed: (see below)    ______________________________________________________________________    Medications:     Current Facility-Administered Medications   Medication Dose Route Frequency    metoprolol tartrate (LOPRESSOR) tablet 25 mg  25 mg Oral BID    albuterol-ipratropium (DUO-NEB) 2.5 MG-0.5 MG/3 ML  3 mL Nebulization Q4H PRN    methylPREDNISolone (PF) (SOLU-MEDROL) injection 40 mg  40 mg IntraVENous Q8H    guaiFENesin ER (MUCINEX) tablet 600 mg  600 mg Oral Q12H    polyethylene glycol (MIRALAX) packet 17 g  17 g Oral DAILY PRN    rosuvastatin (CRESTOR) tablet 20 mg  20 mg Oral QHS    enoxaparin (LOVENOX) injection 40 mg  40 mg SubCUTAneous Q24H    acetaminophen (TYLENOL) tablet 650 mg  650 mg Oral Q6H PRN    clopidogrel (PLAVIX) tablet 75 mg  75 mg Oral DAILY    sodium chloride (NS) flush 5-10 mL  5-10 mL IntraVENous Q8H    sodium chloride (NS) flush 5-10 mL  5-10 mL IntraVENous PRN    budesonide (PULMICORT) 500 mcg/2 ml nebulizer suspension  500 mcg Nebulization BID RT    docusate sodium (COLACE) capsule 100 mg  100 mg Oral BID    arformoterol (BROVANA) neb solution 15 mcg  15 mcg Nebulization BID RT    aspirin chewable tablet 81 mg  81 mg Oral EVERY OTHER DAY    pantoprazole (PROTONIX) tablet 40 mg  40 mg Oral DAILY    lisinopril/hydroCHLOROthiazide (PRINZIDE/ZESTORETIC) 10/12.5   Oral DAILY            Lab Review:     Recent Labs      04/09/18   0153  04/08/18   0219  04/07/18   0619   WBC  12.6*  11.1*  12.1*   HGB  13.9  13.7  13.1   HCT  44.1  42.6  39.9   PLT  269  257  253     Recent Labs      04/09/18   0153  04/08/18   0219  04/07/18   0619   NA   --    --   139   K   --    --   4.2   CL   --    --   104   CO2   --    --   29   GLU   --    --   119*   BUN   --    --   21*   CREA   --    --   0.63   CA   --    --   8.6   MG  2.1  1.9  1.9   PHOS  4.4  3.1  3.4     No results found for: GLUCPOC  No results for input(s): PH, PCO2, PO2, HCO3, FIO2 in the last 72 hours. No results for input(s): INR in the last 72 hours.     No lab exists for component: INREXT, INREXT  No results found for: SDES  Lab Results   Component Value Date/Time    Culture result: NO GROWTH 5 DAYS 04/03/2018 01:12 PM            Assessment:     Principal Problem:    COPD exacerbation ([address] (4/3/2018)    Active Problems:    HTN (hypertension) (4/1/2009)      GERD (gastroesophageal reflux disease) (4/1/2009)      Acute respiratory failure with hypoxia (HCC) (4/3/2018)      Sinus tachycardia (4/3/2018)      CAD (coronary artery disease) (4/3/2018)           Plan:     Principal Problem:    COPD exacerbation ([address] (4/3/2018)   - continue nebs, steroids, levofloxacin   - positive for metapneumovirus   - home today, likely needs home oxygen, RT to do 6-min walk to reassess    Active Problems:    HTN (hypertension) (4/1/2009)   - BP okay on home meds, follow      GERD (gastroesophageal reflux disease) (4/1/2009)   - continue PPI      Acute respiratory failure with hypoxia ([address] (4/3/2018)   - resolving, still hypoxic at rest, again 88% when I examined her off oxygen   - she is reconsidering home oxygen      Sinus tachycardia (4/3/2018)   - CTA negative for PE      CAD (coronary artery disease) (4/3/2018)   - stable, continue cardiac meds      Total time spent with patient: 25 minutes                  Care Plan discussed with: Patient and Nursing Staff    Discussed:  Code Status, Care Plan and D/C Planning    Prophylaxis:  Lovenox    Disposition:   PT, OT, RN           ___________________________________________________    Attending Physician: Jose Helm MD

## 2018-04-09 NOTE — DISCHARGE SUMMARY
Physician Discharge Summary     Patient ID:  Andrei Rangel  [id number]  78 y.o.  1940    Admit date: 4/3/2018    Discharge date: 4/9/2018    Admission Diagnoses: COPD exacerbation [facility])    Discharge Diagnoses:  Principal Diagnosis COPD exacerbation ([facility]                                            Principal Problem:    COPD exacerbation ([facility] (4/3/2018)    Active Problems:    HTN (hypertension) (4/1/2009)      GERD (gastroesophageal reflux disease) (4/1/2009)      Acute respiratory failure with hypoxia (HCC) (4/3/2018)      Sinus tachycardia (4/3/2018)      CAD (coronary artery disease) (4/3/2018)         Resolved Problems:  Problem List as of 4/9/2018  Date Reviewed: 2/15/2018          Codes Class Noted - Resolved    * (Principal)COPD exacerbation ([facility] ICD-10-CM: J44.1  ICD-9-CM: 491.21  4/3/2018 - Present        Acute respiratory failure with hypoxia ([facility] ICD-10-CM: J96.01  ICD-9-CM: 518.81  4/3/2018 - Present        Sinus tachycardia ICD-10-CM: R00.0  ICD-9-CM: 427.89  4/3/2018 - Present        CAD (coronary artery disease) (Chronic) ICD-10-CM: I25.10  ICD-9-CM: 414.00  4/3/2018 - Present        S/P CABG (coronary artery bypass graft) (Chronic) ICD-10-CM: Z95.1  ICD-9-CM: V45.81  4/3/2018 - Present        Chronic obstructive pulmonary disease ([facility] ICD-10-CM: J44.9  ICD-9-CM: 496  5/9/2016 - Present        ACP (advance care planning) ICD-10-CM: Z71.89  ICD-9-CM: V65.49  4/26/2016 - Present    Overview Addendum 6/6/2017  5:01 PM by Meg Carney RN     Her  would make medical decisions  6/6/2017 A copy of patient's completed Advanced Medical Directive is on file in the patient's medical record. NN reviewed Advanced Medical Directive document with patient & patient denies the need for changes/ updates.                   Asthma ICD-10-CM: E24.103  ICD-9-CM: 493.90  10/11/2012 - Present        HTN (hypertension) (Chronic) ICD-10-CM: I10  ICD-9-CM: 401.9  4/1/2009 - Present        Serum lipids high ICD-10-CM: E78.5  ICD-9-CM: 272.4  4/1/2009 - Present        GERD (gastroesophageal reflux disease) (Chronic) ICD-10-CM: K21.9  ICD-9-CM: 530.81  4/1/2009 - Present        Menopausal state ICD-10-CM: N95.1  ICD-9-CM: 627.2  4/1/2009 - Present                Hospital Course:   Ms. Silviano Woodward was admitted to the Hospitalist Service on the 3rd floor for treatment of acute COPD. She was treated for acute COPD with slow improvement. She ruled in for non-ST elevation MI for action by enzymes. She was evaluated by cardiology and treated medically. She remained fairly hypoxic throughout her hospital course but at the time of discharge did not qualify for home oxygen. She was offered home health but declined. She was discharged home on 4/9/2018 in improved condition. PCP: Susan Navarrete MD    Consults: Cardiology and Pulmonary/Intensive care    Discharge Exam:  See my Progress Note from today. Disposition: home    Patient Instructions:   Current Discharge Medication List      START taking these medications    Details   clopidogrel (PLAVIX) 75 mg tab Take 1 Tab by mouth daily. Qty: 30 Tab, Refills: 0      guaiFENesin ER (MUCINEX) 600 mg ER tablet Take 1 Tab by mouth every twelve (12) hours. Qty: 60 Tab, Refills: 0      metoprolol tartrate (LOPRESSOR) 25 mg tablet Take 1 Tab by mouth two (2) times a day. Indications: Acute Coronary Syndrome, inappropriate sinus tachycardia  Qty: 60 Tab, Refills: 0         CONTINUE these medications which have CHANGED    Details   predniSONE (DELTASONE) 20 mg tablet 2 tabs twice a day for 4 days, then 1 tab twice a day for 4 days, then 1 tab daily for 4 days, then 1/2 tab daily for 4 days. (30 tabs, 16 days total)  Qty: 30 Tab, Refills: 0         CONTINUE these medications which have NOT CHANGED    Details   rosuvastatin (CRESTOR) 20 mg tablet Take 20 mg by mouth nightly. omeprazole (PRILOSEC) 20 mg capsule Take 1 Cap by mouth daily for 90 days.  This to replace script for tablets  Qty: 90 Cap, Refills: 1      lisinopril-hydroCHLOROthiazide (PRINZIDE, ZESTORETIC) 10-12.5 mg per tablet Take 1 Tab by mouth daily for 90 days. Qty: 90 Tab, Refills: 0      KRILL OIL PO Take 1 Cap by mouth daily. budesonide-formoterol (SYMBICORT) 160-4.5 mcg/actuation HFA inhaler Take 2 Puffs by inhalation two (2) times a day. Qty: 3 Inhaler, Refills: 1      magnesium 250 mg tab Take 500 mg by mouth daily. aclidinium bromide (TUDORZA PRESSAIR) 400 mcg/actuation inhaler Take 1 Puff by inhalation daily. cholecalciferol (VITAMIN D3) 1,000 unit tablet Take 1,000 Units by mouth daily. albuterol (PROVENTIL HFA, VENTOLIN HFA) 90 mcg/actuation inhaler Take 2 Puffs by inhalation every four (4) hours as needed for Wheezing. Qty: 1 Inhaler, Refills: 2      CALCIUM CARBONATE/VITAMIN D3 (CALCIUM + D PO) Take 1 Cap by mouth daily. ASPIRIN 81 mg chewable tablet Take 81 mg by mouth daily. STOP taking these medications       doxycycline (MONODOX) 100 mg capsule Comments:   Reason for Stopping:              Activity: Activity as tolerated  Diet: Cardiac Diet  Wound Care: None needed    Follow-up Information     Follow up With Details Comments Contact Info    Robson Raines MD On 4/11/2018 11 am [address] [place] [address],[address]  [place] [address].      [facility], [address] [facility]  [address]  Internal [facility] Renato Oreilly [facility]. 56.  [phone]            35 minutes were spent on this discharge.     Signed:  Evlyn Halsted, MD  4/9/2018  2:31 PM

## 2018-04-09 NOTE — PROGRESS NOTES
1051: Pt had 6 beat run of VT. Pt asymptomatic, resting comfortably in recliner. Notified MD Marin Jeffrey. No new orders at this time. 1230: Notified PT pt is in need of home O2 assessment. Order in place.

## 2018-04-10 ENCOUNTER — PATIENT OUTREACH (OUTPATIENT)
Dept: INTERNAL MEDICINE CLINIC | Age: 78
End: 2018-04-10

## 2018-04-10 NOTE — PROGRESS NOTES
4/10/2018 NN spoke briefly with patient. She was SOB because she had just ambulated to the bathroom and did not want to talk. She is scheduled to see the Cardiologist in the am and Dr. Vanessa Forbes on 2018. Inpatient Case Management noted: PT does not recommend any home health and patient does not qualify for 02. Pt refused H2H - COPD. Hospital Discharge Follow-Up      Date/Time:  4/10/2018 4:00 PM    Patient was admitted to [facility] on 4/3/18 and discharged on 18 for COPD exacerbation. The physician discharge summary was available at the time of outreach. Patient was contacted within 1 business days of discharge. Inpatient RRAT score: 17    Top Challenges reviewed with the provider   Management of COPD         Was this a readmission? No   Patient stated reason for the readmission: NA    Method of communication with provider : Face to face. Nurse Navigator (NN) contacted the patient by telephone to perform post hospital discharge assessment. Verified name and  with patient as identifiers. Provided introduction to self, and explanation of the Nurse Navigator role. Reviewed discharge instructions and red flags with  patient who verbalized understanding. Patient given an opportunity to ask questions and does not have any further questions or concerns at this time. The patient agrees to contact the PCP office for questions related to their healthcare. NN provided contact information for future reference. Summary of patients top problems:  1. COPD management    Home Health orders at discharge: Patient refused [facility]. Durable Medical Equipment ordered/company: NA  Durable Medical Equipment received: NA    Barriers to care? None at this time. Advance Care Planning: Reviewed and current. Medication reconciliation was declined at this call. Current Outpatient Prescriptions   Medication Sig    clopidogrel (PLAVIX) 75 mg tab Take 1 Tab by mouth daily.     guaiFENesin ER (MUCINEX) 600 mg ER tablet Take 1 Tab by mouth every twelve (12) hours.  metoprolol tartrate (LOPRESSOR) 25 mg tablet Take 1 Tab by mouth two (2) times a day. Indications: Acute Coronary Syndrome, inappropriate sinus tachycardia    predniSONE (DELTASONE) 20 mg tablet 2 tabs twice a day for 4 days, then 1 tab twice a day for 4 days, then 1 tab daily for 4 days, then 1/2 tab daily for 4 days. (30 tabs, 16 days total)    rosuvastatin (CRESTOR) 20 mg tablet Take 20 mg by mouth nightly.  omeprazole (PRILOSEC) 20 mg capsule Take 1 Cap by mouth daily for 90 days. This to replace script for tablets    lisinopril-hydroCHLOROthiazide (PRINZIDE, ZESTORETIC) 10-12.5 mg per tablet Take 1 Tab by mouth daily for 90 days.  KRILL OIL PO Take 1 Cap by mouth daily.  budesonide-formoterol (SYMBICORT) 160-4.5 mcg/actuation HFA inhaler Take 2 Puffs by inhalation two (2) times a day.  magnesium 250 mg tab Take 500 mg by mouth daily.  aclidinium bromide (TUDORZA PRESSAIR) 400 mcg/actuation inhaler Take 1 Puff by inhalation daily.  cholecalciferol (VITAMIN D3) 1,000 unit tablet Take 1,000 Units by mouth daily.  albuterol (PROVENTIL HFA, VENTOLIN HFA) 90 mcg/actuation inhaler Take 2 Puffs by inhalation every four (4) hours as needed for Wheezing.  CALCIUM CARBONATE/VITAMIN D3 (CALCIUM + D PO) Take 1 Cap by mouth daily.  ASPIRIN 81 mg chewable tablet Take 81 mg by mouth daily. No current facility-administered medications for this visit. PCP/Specialist follow up:   Future Appointments  Date Time Provider Jillian Gusman   4/12/2018 2:15 PM Narayan Argueta MD [address]          Goals      Reduce risk of COPD Exacerbations (ie. managing triggers, health maintenance with COPD).

## 2018-04-12 ENCOUNTER — OFFICE VISIT (OUTPATIENT)
Dept: INTERNAL MEDICINE CLINIC | Age: 78
End: 2018-04-12

## 2018-04-12 VITALS
BODY MASS INDEX: 19.99 KG/M2 | OXYGEN SATURATION: 95 % | SYSTOLIC BLOOD PRESSURE: 119 MMHG | WEIGHT: 124.4 LBS | DIASTOLIC BLOOD PRESSURE: 62 MMHG | HEIGHT: 66 IN | TEMPERATURE: 97.6 F | RESPIRATION RATE: 14 BRPM | HEART RATE: 108 BPM

## 2018-04-12 DIAGNOSIS — I25.10 CORONARY ARTERY DISEASE INVOLVING NATIVE HEART WITHOUT ANGINA PECTORIS, UNSPECIFIED VESSEL OR LESION TYPE: Chronic | ICD-10-CM

## 2018-04-12 DIAGNOSIS — I10 ESSENTIAL HYPERTENSION: Chronic | ICD-10-CM

## 2018-04-12 DIAGNOSIS — J44.1 COPD EXACERBATION (HCC): Primary | ICD-10-CM

## 2018-04-12 RX ORDER — PREDNISONE 20 MG/1
TABLET ORAL
Qty: 40 TAB | Refills: 0 | Status: SHIPPED | OUTPATIENT
Start: 2018-04-12 | End: 2018-07-25 | Stop reason: ALTCHOICE

## 2018-04-12 NOTE — PROGRESS NOTES
HISTORY OF PRESENT ILLNESS  Nneka Joshi is a 78 y.o. female. Presents with . HPI   Ms. Nneka Joshi is a 78y.o. year old female, she is seen today for Transition of Care services following a hospital discharge for COPD exacerbation on 4/09/2018. Our office Nurse Navigator performed an outreach to Ms. Saurabh Griffin on 04/10/2018 (within 2 business days of discharge) to complete medication reconciliation and a telephonic assessment of her condition. Patient states 3/31 she had slight cough and 4/1 she had cough and looked fatigued. On 4/2 she followed up with PA and was given doxycyline. She woke up on 4/3 with SOB and was taken to hospital. Patient admitted to ER on 4/3/18 for COPD exacerbation and released on 4/9/18. She was treated with slow improvement. She was treated with IV levofloxacin. Patient was evaluated by cardiology. Cardiac enzymes showed small heart attack. Patient did not qualify for home oxygen and declined home health. She was discharged on Plavix, Mucinex, metoprolol. She continues Prednisone and was taken off of doxycyline. She has been monitoring oxygenation at home and it has been 92-93. She states nebulizer treatments do not help with breathing. Admits to sputum production. CAD: Patient will follow up with cardiology. She continues Plavix and metoprolol. Hypertension ROS: taking medications as instructed, no medication side effects noted, no TIA's, no chest pain on exertion, no dyspnea on exertion, no swelling of ankles. New concerns:  Patient's BP in office today is 119/62. Review of Systems   All other systems reviewed and are negative. Physical Exam   Constitutional: She is oriented to person, place, and time. Looks sob, pursed breathing   HENT:   Head: Normocephalic and atraumatic.    Right Ear: External ear normal.   Left Ear: External ear normal.   Nose: Nose normal.   Mouth/Throat: Oropharynx is clear and moist.   Eyes: Conjunctivae and EOM are normal.   Neck: Normal range of motion. Neck supple. Carotid bruit is not present. No thyroid mass and no thyromegaly present. Cardiovascular: Normal rate, regular rhythm, S1 normal, S2 normal, normal heart sounds and intact distal pulses. tachy   Pulmonary/Chest: Effort normal. She has wheezes. [place] inspiratory and expiratory wheezes, mostly in the bases. Abdominal: Soft. Normal appearance and bowel sounds are normal. There is no hepatosplenomegaly. There is no tenderness. Musculoskeletal: Normal range of motion. Neurological: She is alert and oriented to person, place, and time. She has normal strength. No cranial nerve deficit or sensory deficit. Coordination normal.   Skin: Skin is warm, dry and intact. No abrasion and no rash noted. Psychiatric: She has a normal mood and affect. Her behavior is normal. Judgment and thought content normal.   Nursing note and vitals reviewed. ASSESSMENT and PLAN  Diagnoses and all orders for this visit:    1. COPD exacerbation ([address]  Concerned patient is tapering down Prednisone too quickly. She will continue Prednisone, but will taper down more slowly and continue to monitor. Resume doxycycline for 7 days to help with some extra mucus in lungs  -     predniSONE (DELTASONE) 20 mg tablet; 2 tabs bid for 5 days, 1 tab bid for 7 days, 1/2 tab bid  for 5 days    2. Coronary artery disease involving native heart without angina pectoris, unspecified vessel or lesion type  She will follow up with cardiology and continue Plavix and metoprolol. 3. Essential hypertension  BP is at goal. I do not recommend any change in medications. Patient agrees to home PT.    lab results and schedule of future lab studies reviewed with patient  reviewed diet, exercise and weight control    Written by Stephen Pedroza, as dictated by Cathy Rivera MD.     Current diagnosis and concerns discussed with pt at length.  Understands risks and benefits or current treatment plan and medications and accepts the treatment and medication with any possible risks. Pt asks appropriate questions which were answered. Pt instructed to call with any concerns or problems.

## 2018-04-19 ENCOUNTER — TELEPHONE (OUTPATIENT)
Dept: INTERNAL MEDICINE CLINIC | Age: 78
End: 2018-04-19

## 2018-04-19 NOTE — TELEPHONE ENCOUNTER
Please see if you can get her in with a physician there - or hers- just got out of ICU- I gave her another round of steroids and antibiotics but she was still pretty short of breath using accessory muscle- can they see her tomorrow?

## 2018-04-19 NOTE — TELEPHONE ENCOUNTER
Obtained appt with Pulmonary for Wed April 25 at 11:15. They will provide message to physician nurse for work in tomorrow.  Pt aware

## 2018-04-19 NOTE — TELEPHONE ENCOUNTER
----- Message from [facility] sent at 4/19/2018  7:29 AM EDT -----  Regarding: RE: Non-Urgent Medical Question  Contact: [phone]  Yes. ..but its no use seeing the PA. Didnt help the last time. Need to see Dr Hannah Mcdaniel. Thanks fir your help. Jaskaran  ----- Message -----  From: Eze Thornton MD  Sent: 4/19/2018  7:27 AM EDT  To: [facility]  Subject: RE: Non-Urgent Medical Question    Let us call and see if we can get you in sooner- who is it? ?    ----- Message -----     From: [facility]     Sent: 4/19/2018  7:05 AM EDT       To: Eze Thornton MD  Subject: RE: Non-Urgent Medical Question    I do. My next appt is May 14. What do you think? Do I stop the antibiotics after today?  ----- Message -----  From: Eze Thornton MD  Sent: 4/19/2018  7:01 AM EDT  To: [facility]  Subject: RE: Non-Urgent Medical Question    I think we need you to see pulmonary again- do you have an appointment with them coming up at all?    ----- Message -----     From: [facility]     Sent: 4/19/2018  6:04 AM EDT       To: Eze Thornton MD  Subject: Non-Urgent Medical Question    Dr Vanessa Forbes, Im concerned I still have the congestion that I had in your office. Todsyis the last day if the antibiotics. I dont know if it helped. I still cough and still have a rattle. Is it affecting my lungs long term. A duty still affects my breathing. Im seeing my cardiologist today for an echocardiogram.  Thanks fir your help. Hope you had a good run.  &#[id number];.   [place]iana

## 2018-04-25 ENCOUNTER — HOME HEALTH ADMISSION (OUTPATIENT)
Dept: HOME HEALTH SERVICES | Facility: HOME HEALTH | Age: 78
End: 2018-04-25

## 2018-04-25 ENCOUNTER — PATIENT OUTREACH (OUTPATIENT)
Dept: INTERNAL MEDICINE CLINIC | Age: 78
End: 2018-04-25

## 2018-04-25 DIAGNOSIS — J44.1 COPD EXACERBATION (HCC): Primary | ICD-10-CM

## 2018-05-01 ENCOUNTER — PATIENT OUTREACH (OUTPATIENT)
Dept: INTERNAL MEDICINE CLINIC | Age: 78
End: 2018-05-01

## 2018-05-01 RX ORDER — ALBUTEROL SULFATE 0.83 MG/ML
SOLUTION RESPIRATORY (INHALATION) ONCE
COMMUNITY

## 2018-05-01 RX ORDER — ERYTHROMYCIN 250 MG/1
500 TABLET, COATED ORAL
COMMUNITY
End: 2019-04-30 | Stop reason: ALTCHOICE

## 2018-05-01 NOTE — PROGRESS NOTES
Goals      Improve activity tolerance and quality of life (ie. identify issue such as depression)            5/1/2018 Patient will begin Pulmonary Rehab. She is awaiting their call back to schedule. PAC       Reduce risk of COPD Exacerbations (ie. managing triggers, health maintenance with COPD). 5/1/2018 Went to Pulmonologist last week. She is trying O2 at home all day. Started on Erythromycin 250 mg 3 x week. Discontinued Doxycyline. She has finished Prednisone. Using albuterol neb in the morning upon awakening. She understands to call Pulmonologist if breathing worsens or cough does not improve.  PAC

## 2018-05-02 ENCOUNTER — PATIENT OUTREACH (OUTPATIENT)
Dept: INTERNAL MEDICINE CLINIC | Age: 78
End: 2018-05-02

## 2018-05-02 NOTE — PROGRESS NOTES
5/2/2018 Patient has called NN to request help to schedule pulmonary rehab. NN reached out to Dr. Cheri Azevedo office. Office reported order was faxed to [facility] Pulmonary rehab. Dr. Syeda Marlow nurse will follow up and call patient back today. NN informed patient and she is to report back to NN when she is scheduled. Patient agreed.  PAC

## 2018-05-11 ENCOUNTER — PATIENT OUTREACH (OUTPATIENT)
Dept: INTERNAL MEDICINE CLINIC | Age: 78
End: 2018-05-11

## 2018-05-15 ENCOUNTER — PATIENT OUTREACH (OUTPATIENT)
Dept: INTERNAL MEDICINE CLINIC | Age: 78
End: 2018-05-15

## 2018-05-15 NOTE — PROGRESS NOTES
Patient has graduated from the Transitions of Care Coordination  program on 5/15/2018. Patient's symptoms are stable at this time. Patient/family has the ability to self-manage. Care management goals have been completed at this time. No further nurse navigator follow up scheduled. Goals Addressed      Improve activity tolerance and quality of life (ie. identify issue such as depression)                  5/15/2018 Patient reports she continues to improve. She will begin Pulmonary rehab next month. [facility]  5/1/2018 Patient will begin Pulmonary Rehab. She is awaiting their call back to schedule. PAC       Reduce risk of COPD Exacerbations (ie. managing triggers, health maintenance with COPD). 5/15/2018 Remains on Erythromycin 3 x week. No further exacerbations. [facility]    5/1/2018 Went to Pulmonologist last week. She is trying O2 at home all day. Started on Erythromycin 250 mg 3 x week. Discontinued Doxycyline. She has finished Prednisone. Using albuterol neb in the morning upon awakening. She understands to call Pulmonologist if breathing worsens or cough does not improve. PAC            Pt has nurse navigator's contact information for any further questions, concerns, or needs. Patients upcoming visits:  No future appointments.

## 2018-07-25 ENCOUNTER — OFFICE VISIT (OUTPATIENT)
Dept: INTERNAL MEDICINE CLINIC | Age: 78
End: 2018-07-25

## 2018-07-25 ENCOUNTER — HOSPITAL ENCOUNTER (OUTPATIENT)
Dept: LAB | Age: 78
Discharge: HOME | End: 2018-07-25
Payer: MEDICARE

## 2018-07-25 VITALS
HEART RATE: 82 BPM | RESPIRATION RATE: 14 BRPM | OXYGEN SATURATION: 96 % | DIASTOLIC BLOOD PRESSURE: 60 MMHG | TEMPERATURE: 98.2 F | BODY MASS INDEX: 19.83 KG/M2 | SYSTOLIC BLOOD PRESSURE: 131 MMHG | WEIGHT: 123.4 LBS | HEIGHT: 66 IN

## 2018-07-25 DIAGNOSIS — M54.42 ACUTE LEFT-SIDED LOW BACK PAIN WITH LEFT-SIDED SCIATICA: ICD-10-CM

## 2018-07-25 DIAGNOSIS — H26.9 CATARACT, UNSPECIFIED CATARACT TYPE, UNSPECIFIED LATERALITY: ICD-10-CM

## 2018-07-25 DIAGNOSIS — R60.9 EDEMA, UNSPECIFIED TYPE: ICD-10-CM

## 2018-07-25 DIAGNOSIS — G62.9 NEUROPATHY: ICD-10-CM

## 2018-07-25 DIAGNOSIS — J44.1 CHRONIC OBSTRUCTIVE PULMONARY DISEASE WITH ACUTE EXACERBATION (HCC): Primary | ICD-10-CM

## 2018-07-25 DIAGNOSIS — I10 ESSENTIAL HYPERTENSION: Chronic | ICD-10-CM

## 2018-07-25 DIAGNOSIS — I25.10 CORONARY ARTERY DISEASE INVOLVING NATIVE HEART WITHOUT ANGINA PECTORIS, UNSPECIFIED VESSEL OR LESION TYPE: Chronic | ICD-10-CM

## 2018-07-25 PROCEDURE — 80053 COMPREHEN METABOLIC PANEL: CPT

## 2018-07-25 PROCEDURE — 36415 COLL VENOUS BLD VENIPUNCTURE: CPT

## 2018-07-25 PROCEDURE — 80061 LIPID PANEL: CPT

## 2018-07-25 RX ORDER — LISINOPRIL AND HYDROCHLOROTHIAZIDE 10; 12.5 MG/1; MG/1
1 TABLET ORAL DAILY
Qty: 90 TAB | Refills: 1 | Status: SHIPPED | OUTPATIENT
Start: 2018-07-25 | End: 2019-01-29 | Stop reason: ALTCHOICE

## 2018-07-25 RX ORDER — DULOXETIN HYDROCHLORIDE 30 MG/1
30 CAPSULE, DELAYED RELEASE ORAL DAILY
Qty: 30 CAP | Refills: 3 | Status: SHIPPED | OUTPATIENT
Start: 2018-07-25 | End: 2019-01-29

## 2018-07-25 NOTE — PROGRESS NOTES
HISTORY OF PRESENT ILLNESS  Bladimir Momin is a 78 y.o. female. HPI  COPD: Denies CP or SOB. Pt used oxygen while on vacation trips (from May to July 2018). She used rescue inhaler when walking long distances on vacation. Hypertension ROS: taking medications as instructed, no medication side effects noted, no TIA's, no chest pain on exertion, no dyspnea on exertion, no swelling of ankles. New concerns:  Patient's BP in office today is 131/60. CAD: Stable condition. Pt continues on current meds. Edema: She noticed ankle edema while on vacation, which has now resolved. Pt currently not taking any meds. Neuropathy: Pt reports neuropathy in feet and difficulty walking. She takes Tylenol (4x/day) for pain. Low Back Pain: Pt notes low back pain with aggravated pain on left side. She reports that pain radiates down legs. She also experiences numbness in left leg. She stretches legs every morning. Pt declines PT at this time. Arthralgia: Pt reports increased discomfort from hip arthralgia. She recently followed up with Dr. Marisabel Casas for injection. Cataracts: Pt will undergo cataracts surgery on 8/13/18 and 8/21/18. She will follow up for pre-op examination with NP on Friday. Review of Systems   All other systems reviewed and are negative. Physical Exam   Constitutional: She is oriented to person, place, and time. She appears well-developed and well-nourished. HENT:   Head: Normocephalic and atraumatic. Right Ear: External ear normal.   Left Ear: External ear normal.   Nose: Nose normal.   Mouth/Throat: Oropharynx is clear and moist.   Eyes: Conjunctivae and EOM are normal.   Neck: Normal range of motion. Neck supple. Carotid bruit is not present. No thyroid mass and no thyromegaly present. Cardiovascular: Normal rate, regular rhythm, S1 normal, S2 normal, normal heart sounds and intact distal pulses. Pulmonary/Chest: Effort normal and breath sounds normal.   Abdominal: Soft. Normal appearance and bowel sounds are normal. There is no hepatosplenomegaly. There is no tenderness. Musculoskeletal: Normal range of motion. Neurological: She is alert and oriented to person, place, and time. She has normal strength. No cranial nerve deficit or sensory deficit. Coordination normal.   Skin: Skin is warm, dry and intact. No abrasion and no rash noted. Psychiatric: She has a normal mood and affect. Her behavior is normal. Judgment and thought content normal.   Nursing note and vitals reviewed. ASSESSMENT and PLAN  Diagnoses and all orders for this visit:    1. Chronic obstructive pulmonary disease with acute exacerbation (HCC)  Stable, and doing well. Continue current medications. 2. Essential hypertension  BP is at goal. I do not recommend any change in medications.  -     METABOLIC PANEL, COMPREHENSIVE    3. Coronary artery disease involving native heart without angina pectoris, unspecified vessel or lesion type  Stable, and doing well. Continue current medications.  -     LIPID PANEL    4. Edema, unspecified type  Stable, and doing well. Pt currently not taking any meds. 5. Neuropathy  Prescribed Cymbalta for pain. Discussed that Cymbalta can cause nausea for first couple of days. Discussed that pt can continue to take Tylenol (up to 4x/day). -     DULoxetine (CYMBALTA) 30 mg capsule; Take 1 Cap by mouth daily. 6. Acute left-sided low back pain with left-sided sciatica  Stable condition. Pt declines PT at this time. Will continue to monitor for any changes or improvements. 7. Cataract, unspecified cataract type, unspecified laterality  Pt will follow up for pre-op examination with NP on Friday. Pt would be stable for surgery, based on today's examination. Other orders  -     lisinopril-hydroCHLOROthiazide (PRINZIDE, ZESTORETIC) 10-12.5 mg per tablet; Take 1 Tab by mouth daily. Advised pt to follow up with Dr. Ina Good for hip arthralgia.  Encouraged pt to stay active and take Tylenol for pain. This note will not be viewable in [address] Lab results and schedule of future lab studies reviewed with patient. Reviewed diet, exercise and weight control. Written by Hui Erazo, as dictated by Juaquin Benavides MD.     Current diagnosis and concerns discussed with pt at length. Understands risks and benefits or current treatment plan and medications and accepts the treatment and medication with any possible risks. Pt asks appropriate questions which were answered. Pt instructed to call with any concerns or problems.

## 2018-07-26 LAB
ALBUMIN SERPL-MCNC: 4.3 G/DL (ref 3.5–4.8)
ALBUMIN/GLOB SERPL: 1.8 {RATIO} (ref 1.2–2.2)
ALP SERPL-CCNC: 70 IU/L (ref 39–117)
ALT SERPL-CCNC: 19 IU/L (ref 0–32)
AST SERPL-CCNC: 22 IU/L (ref 0–40)
BILIRUB SERPL-MCNC: 1.2 MG/DL (ref 0–1.2)
BUN SERPL-MCNC: 17 MG/DL (ref 8–27)
BUN/CREAT SERPL: 27 (ref 12–28)
CALCIUM SERPL-MCNC: 9.8 MG/DL (ref 8.7–10.3)
CHLORIDE SERPL-SCNC: 99 MMOL/L (ref 96–106)
CHOLEST SERPL-MCNC: 156 MG/DL (ref 100–199)
CO2 SERPL-SCNC: 24 MMOL/L (ref 20–29)
CREAT SERPL-MCNC: 0.64 MG/DL (ref 0.57–1)
GLOBULIN SER CALC-MCNC: 2.4 G/DL (ref 1.5–4.5)
GLUCOSE SERPL-MCNC: 95 MG/DL (ref 65–99)
HDLC SERPL-MCNC: 83 MG/DL
INTERPRETATION, 910389: NORMAL
LDLC SERPL CALC-MCNC: 59 MG/DL (ref 0–99)
POTASSIUM SERPL-SCNC: 4.6 MMOL/L (ref 3.5–5.2)
PROT SERPL-MCNC: 6.7 G/DL (ref 6–8.5)
SODIUM SERPL-SCNC: 140 MMOL/L (ref 134–144)
TRIGL SERPL-MCNC: 70 MG/DL (ref 0–149)
VLDLC SERPL CALC-MCNC: 14 MG/DL (ref 5–40)

## 2018-08-01 ENCOUNTER — OFFICE VISIT (OUTPATIENT)
Dept: INTERNAL MEDICINE CLINIC | Age: 78
End: 2018-08-01

## 2018-08-01 VITALS
TEMPERATURE: 98.5 F | SYSTOLIC BLOOD PRESSURE: 109 MMHG | WEIGHT: 123.4 LBS | DIASTOLIC BLOOD PRESSURE: 63 MMHG | OXYGEN SATURATION: 95 % | RESPIRATION RATE: 12 BRPM | BODY MASS INDEX: 19.83 KG/M2 | HEIGHT: 66 IN | HEART RATE: 81 BPM

## 2018-08-01 DIAGNOSIS — J44.9 CHRONIC OBSTRUCTIVE PULMONARY DISEASE, UNSPECIFIED COPD TYPE (HCC): ICD-10-CM

## 2018-08-01 DIAGNOSIS — I25.10 CORONARY ARTERY DISEASE INVOLVING NATIVE HEART WITHOUT ANGINA PECTORIS, UNSPECIFIED VESSEL OR LESION TYPE: Chronic | ICD-10-CM

## 2018-08-01 DIAGNOSIS — I10 ESSENTIAL HYPERTENSION: Chronic | ICD-10-CM

## 2018-08-01 DIAGNOSIS — G62.9 NEUROPATHY: ICD-10-CM

## 2018-08-01 DIAGNOSIS — H25.9 AGE-RELATED CATARACT OF BOTH EYES, UNSPECIFIED AGE-RELATED CATARACT TYPE: Primary | ICD-10-CM

## 2018-08-01 DIAGNOSIS — Z01.818 PREOP GENERAL PHYSICAL EXAM: ICD-10-CM

## 2018-08-01 PROBLEM — Z85.51 HISTORY OF BLADDER CANCER: Status: ACTIVE | Noted: 2018-08-01

## 2018-08-01 NOTE — PROGRESS NOTES
HISTORY OF PRESENT ILLNESS Bladimir Momin is a 78 y.o. female. HPI Bladimir Momin was referred for evaluation by: Dr. Jimena Felipe for Pre- Op Evaluation. Please see encounter details and orders for consultative summary. Type of surgery : Cataract extraction with intraoperative lens implantation Surgery site : Right eye 8/13/18; left eye 8/27/18 Anesthesia type: MAC/topical 
Date of procedure:  As above Allergies: Allergies Allergen Reactions  Augmentin [Amoxicillin-Pot Clavulanate] Nausea Only Latex allergy: no 
Prior reactions to anesthesia:  None Functional status:  she is unable to ambulate up flights of steps without shortness of breath, chest pain but breathing status has been stable at her baseline; wears O2 at night on occasion. Prior cardiac evaluation:   Followed by cardiology; has been stable. Was started on Cymbalta 30 mg at her last visit for peripheral neuropathy but stopped after taking 2 doses; developed headache, lethargy and nausea. Current Outpatient Prescriptions Medication Sig  
 lisinopril-hydroCHLOROthiazide (PRINZIDE, ZESTORETIC) 10-12.5 mg per tablet Take 1 Tab by mouth daily.  erythromycin base (E-MYCIN) 250 mg tablet Take 500 mg by mouth three (3) days a week.  rosuvastatin (CRESTOR) 20 mg tablet Take 20 mg by mouth nightly.  KRILL OIL PO Take 1 Cap by mouth daily.  budesonide-formoterol (SYMBICORT) 160-4.5 mcg/actuation HFA inhaler Take 2 Puffs by inhalation two (2) times a day.  magnesium 250 mg tab Take 500 mg by mouth daily.  aclidinium bromide (TUDORZA PRESSAIR) 400 mcg/actuation inhaler Take 1 Puff by inhalation daily.  cholecalciferol (VITAMIN D3) 1,000 unit tablet Take 1,000 Units by mouth daily.  CALCIUM CARBONATE/VITAMIN D3 (CALCIUM + D PO) Take 1 Cap by mouth daily.  ASPIRIN 81 mg chewable tablet Take 81 mg by mouth every other day.  DULoxetine (CYMBALTA) 30 mg capsule Take 1 Cap by mouth daily.   
 albuterol (PROVENTIL VENTOLIN) 2.5 mg /3 mL (0.083 %) nebulizer solution by Nebulization route once.  lisinopril-hydroCHLOROthiazide (PRINZIDE, ZESTORETIC) 10-12.5 mg per tablet Take 1 Tab by mouth daily for 90 days.  albuterol (PROVENTIL HFA, VENTOLIN HFA) 90 mcg/actuation inhaler Take 2 Puffs by inhalation every four (4) hours as needed for Wheezing. No current facility-administered medications for this visit. Past Medical History:  
Diagnosis Date  Asthma 10/11/2012  CAD (coronary artery disease) 4/3/2018  Cancer ([facility] [address] bladder cancer  Chronic obstructive pulmonary disease ([facility] [address]  GERD (gastroesophageal reflux disease) 4/1/2009  
 HTN 4/1/2009  Menopausal state 4/1/2009  Serum lipid, elevated 4/1/2009 Past Surgical History:  
Procedure Laterality Date  CABG, ARTERY-VEIN, THREE  03/22/2016 Cabg x 3  
 COLONOSCOPY N/A 8/31/2016 COLONOSCOPY performed by Cailin Fish MD at [address] [facility]  [facility]  HX HEENT    
 wisdom teeth extraction  HX TONSILLECTOMY  HX UROLOGICAL    
 TURBT for bladder cancer; recurrent cystoscopies Social History Substance Use Topics  Smoking status: Former Smoker Packs/day: 1.00 Years: 20.00 Types: Cigarettes Quit date: 1/1/1980  Smokeless tobacco: Never Used  Alcohol use 0.6 oz/week 1 Glasses of wine per week Comment: daily glass of wine Blood pressure 109/63, pulse 81, temperature 98.5 °F (36.9 °C), temperature source Oral, resp. rate 12, height 5' 6\" (1.676 m), weight 123 lb 6.4 oz (56 kg), SpO2 95 %. Review of Systems Constitutional: Negative for chills, fever and malaise/fatigue. HENT: Negative for congestion. Eyes: Positive for blurred vision. Negative for pain and discharge. Respiratory: Positive for shortness of breath. Negative for cough. Cardiovascular: Negative for chest pain, palpitations and leg swelling.   
Gastrointestinal: Negative for abdominal pain, constipation, diarrhea, nausea and vomiting. Genitourinary: Negative for dysuria and frequency. Musculoskeletal: Negative for joint pain and myalgias. Skin: Negative for rash. Neurological: Negative for dizziness and headaches. Physical Exam  
Constitutional: She is oriented to person, place, and time. She appears well-developed and well-nourished. HENT:  
Head: Normocephalic and atraumatic. Right Ear: External ear normal.  
Left Ear: External ear normal.  
Nose: Nose normal.  
Mouth/Throat: Oropharynx is clear and moist.  
Eyes: Conjunctivae are normal. Pupils are equal, round, and reactive to light. Neck: Normal range of motion. Neck supple. No thyromegaly present. Cardiovascular: Normal rate and regular rhythm. Pulmonary/Chest: Effort normal and breath sounds normal. She has no wheezes. She has no rales. Abdominal: Soft. Bowel sounds are normal. There is no tenderness. Musculoskeletal: Normal range of motion. Lymphadenopathy:  
  She has no cervical adenopathy. Neurological: She is alert and oriented to person, place, and time. Skin: Skin is warm and dry. Psychiatric: She has a normal mood and affect. Her behavior is normal.  
Nursing note and vitals reviewed. ASSESSMENT and PLAN Diagnoses and all orders for this visit: 1. Age-related cataract of both eyes, unspecified age-related cataract type 2. Preop general physical exam -- considered medically stable for upcoming Cataract extraction surgery with implantation of intraoperative lens 3. Essential hypertension -- stable 4. Coronary artery disease involving native heart without angina pectoris, unspecified vessel or lesion type -- stable; followed by cardiology 5. Chronic obstructive pulmonary disease, unspecified COPD type ([facility] [address] -- stable on current regimen 6. Neuropathy -- unable to tolerate Cymbalta due to side effects.  
 
 
lab results and schedule of future lab studies reviewed with patient 
reviewed diet, exercise and weight control 
reviewed medications and side effects in detail This note will not be viewable in [address]

## 2018-08-01 NOTE — PATIENT INSTRUCTIONS
Cataract Surgery: Before Your Surgery What is cataract surgery? Cataracts are cloudy areas in the lens of your eye. Your lens is behind the colored part of your eye (iris). Its job is to focus light onto the back of your eye. In some people, cataracts prevent light from reaching the back of the eye. This can cause vision problems. Cataract surgery helps you see better. It replaces your natural lens, which has become cloudy, with a clear artificial one. There are two types of cataract surgery. Phacoemulsification (say \"nywm-ci-qz-mxo-cva-mex-CATHERINE-shun\") is the most common type. The doctor makes a small cut in your eye. This cut is called an incision. The doctor uses a special ultrasound tool to break your cloudy lens apart. Sometimes a laser is used too. Then he or she removes the small pieces of the lens through the incision. In most cases, the doctor then inserts an artificial lens through the incision. Most people do not need stitches, because the incision is so small. If the doctor is not able to put in an artificial lens, you can wear a contact lens or thick glasses in place of your natural lens. Extracapsular extraction is a less common type of cataract surgery. The doctor makes a larger incision to remove the whole lens at once. After the doctor removes the lens, he or she stitches up the incision. Recovery from this type of surgery takes longer. Before either surgery, the doctor puts numbing drops in your eye. Some doctors use a shot instead. You may also get medicine to make you feel relaxed. You probably will not feel much pain. The surgery takes about 20 to 40 minutes. After surgery, you may have a bandage or shield on your eye. You will probably go home from surgery after 1 hour in the recovery room. Most people see better in 1 to 3 days. You may be able to go back to work or your normal routine in a few days.  It could take 3 to 10 weeks for your eye to completely heal. After your eye heals, you may still need to wear glasses, especially for reading. Follow-up care is a key part of your treatment and safety. Be sure to make and go to all appointments, and call your doctor if you are having problems. It's also a good idea to know your test results and keep a list of the medicines you take. What happens before surgery? 
 Surgery can be stressful. This information will help you understand what you can expect. And it will help you safely prepare for surgery. 
 Preparing for surgery 
  · Understand exactly what surgery is planned, along with the risks, benefits, and other options. · Tell your doctors ALL the medicines, vitamins, supplements, and herbal remedies you take. Some of these can increase the risk of bleeding or interact with anesthesia.  
  · If you take blood thinners, such as warfarin (Coumadin), clopidogrel (Plavix), or aspirin, be sure to talk to your doctor. He or she will tell you if you should stop taking these medicines before your surgery. Make sure that you understand exactly what your doctor wants you to do.  
  · Your doctor will tell you which medicines to take or stop before your surgery. You may need to stop taking certain medicines a week or more before surgery. So talk to your doctor as soon as you can.  
  · If you have an advance directive, let your doctor know. It may include a living will and a durable power of  for health care. Bring a copy to the hospital. If you don't have one, you may want to prepare one. It lets your doctor and loved ones know your health care wishes. Doctors advise that everyone prepare these papers before any type of surgery or procedure. What happens on the day of surgery? · Follow the instructions exactly about when to stop eating and drinking. If you don't, your surgery may be canceled.  If your doctor told you to take your medicines on the day of surgery, take them with only a sip of water.  
  · Take a bath or shower before you come in for your surgery. Do not apply lotions, perfumes, deodorants, or nail polish.  
  · Take off all jewelry and piercings. And take out contact lenses, if you wear them.  
 At the hospital or surgery center · Bring a picture ID.  
  · The area for surgery is often marked to make sure there are no errors.  
  · You will be kept comfortable and safe by your anesthesia provider. The anesthesia may make you sleep. Or it may just numb the area being worked on.  
  · The surgery will take about 20 to 40 minutes. Going home · Be sure you have someone to drive you home. Anesthesia and pain medicine make it unsafe for you to drive.  
  · You will be given more specific instructions about recovering from your surgery. They will cover things like diet, wound care, follow-up care, driving, and getting back to your normal routine.  
  · You may have a bandage or patch over your eye. You may also have a clear shield over your eye. This prevents you from rubbing it. When should you call your doctor? · You have questions or concerns.  
  · You don't understand how to prepare for your surgery.  
  · You become ill before the surgery (such as fever, flu, or a cold).  
  · You need to reschedule or have changed your mind about having the surgery. Where can you learn more? Go to http://maria r-mason.info/. Enter K474 in the search box to learn more about \"Cataract Surgery: Before Your Surgery. \" Current as of: December 3, 2017 Content Version: 11.7 © 2842-9722 [employer]. Care instructions adapted under license by [employer] (which disclaims liability or warranty for this information). If you have questions about a medical condition or this instruction, always ask your healthcare professional. [place] [address] any warranty or liability for your use of this information.

## 2018-08-01 NOTE — MR AVS SNAPSHOT
[address] [facility] 
 
 
 [address] [place] 100[address] [facility] 
[phone] Patient: Kala Esqueda MRN: [id number] PYI:[id number] Visit Information Date & Time Provider Department Dept. Phone Encounter #  
 8/1/2018 10:20 AM Leland Humphries NP [facility] [address] [id number] Upcoming Health Maintenance Date Due  
 GLAUCOMA SCREENING Q2Y 4/21/2017 Influenza Age 5 to Adult 8/1/2018 MEDICARE YEARLY EXAM 1/16/2019 DTaP/Tdap/Td series (2 - Td) 6/17/2026 COLONOSCOPY 8/31/2026 Allergies as of 8/1/2018  Review Complete On: 8/1/2018 By: Leland Humphries NP Severity Noted Reaction Type Reactions Augmentin [Amoxicillin-pot Clavulanate]  08/25/2016    Nausea Only Current Immunizations  Never Reviewed Name Date Influenza High Dose Vaccine PF 9/22/2016 Influenza Vaccine 10/1/2014 Pneumococcal Conjugate (PCV-13) 11/9/2015 Pneumococcal Vaccine (Unspecified Type) 1/1/2009 Not reviewed this visit You Were Diagnosed With   
  
 Codes Comments Age-related cataract of both eyes, unspecified age-related cataract type    -  Primary ICD-10-CM: H25.9 ICD-9-CM: 366.10 Preop general physical exam     ICD-10-CM: H47.279 ICD-9-CM: V72.83 History of bladder cancer     ICD-10-CM: Z85.51 
ICD-9-CM: V10.51 Coronary artery disease involving native heart without angina pectoris, unspecified vessel or lesion type     ICD-10-CM: I25.10 ICD-9-CM: 414.01 Essential hypertension     ICD-10-CM: I10 
ICD-9-CM: 401.9 Chronic obstructive pulmonary disease, unspecified COPD type ([facility]     ICD-10-CM: J44.9 ICD-9-CM: 021 Vitals BP Pulse Temp Resp Height(growth percentile) Weight(growth percentile) 109/63 (BP 1 Location: Left arm, BP Patient Position: Sitting) 81 98.5 °F (36.9 °C) (Oral) 12 5' 6\" (1.676 m) 123 lb 6.4 oz (56 kg) SpO2 BMI OB Status Smoking Status 95% 19.92 kg/m2 Postmenopausal Former Smoker BMI and BSA Data Body Mass Index Body Surface Area  
 19.92 kg/m 2 1.61 m 2 Preferred Pharmacy Pharmacy Name Phone [address], [address] [phone] Your Updated Medication List  
  
   
This list is accurate as of 8/1/18 11:13 AM.  Always use your most recent med list.  
  
  
  
  
 * albuterol 2.5 mg /3 mL (0.083 %) nebulizer solution Commonly known as:  PROVENTIL VENTOLIN  
by Nebulization route once. * albuterol 90 mcg/actuation inhaler Commonly known as:  PROVENTIL HFA, VENTOLIN HFA, PROAIR HFA Take 2 Puffs by inhalation every four (4) hours as needed for Wheezing. aspirin 81 mg chewable tablet Take 81 mg by mouth every other day. budesonide-formoterol 160-4.5 mcg/actuation Hfaa Commonly known as:  SYMBICORT Take 2 Puffs by inhalation two (2) times a day. CALCIUM + D PO Take 1 Cap by mouth daily. DULoxetine 30 mg capsule Commonly known as:  CYMBALTA Take 1 Cap by mouth daily. erythromycin base 250 mg tablet Commonly known as:  E-MYCIN Take 500 mg by mouth three (3) days a week. KRILL OIL PO Take 1 Cap by mouth daily. * lisinopril-hydroCHLOROthiazide 10-12.5 mg per tablet Commonly known as:  Kathalene Rockers Take 1 Tab by mouth daily for 90 days. * lisinopril-hydroCHLOROthiazide 10-12.5 mg per tablet Commonly known as:  Kathalene Rockers Take 1 Tab by mouth daily. magnesium 250 mg Tab Take 500 mg by mouth daily. rosuvastatin 20 mg tablet Commonly known as:  CRESTOR Take 20 mg by mouth nightly. TUDORZA PRESSAIR 400 mcg/actuation inhaler Generic drug:  aclidinium bromide Take 1 Puff by inhalation daily. VITAMIN D3 1,000 unit tablet Generic drug:  cholecalciferol Take 1,000 Units by mouth daily. * Notice: This list has 4 medication(s) that are the same as other medications prescribed for you. Read the directions carefully, and ask your doctor or other care provider to review them with you. Patient Instructions Cataract Surgery: Before Your Surgery What is cataract surgery? Cataracts are cloudy areas in the lens of your eye. Your lens is behind the colored part of your eye (iris). Its job is to focus light onto the back of your eye. In some people, cataracts prevent light from reaching the back of the eye. This can cause vision problems. Cataract surgery helps you see better. It replaces your natural lens, which has become cloudy, with a clear artificial one. There are two types of cataract surgery. Phacoemulsification (say \"jwwx-fd-py-lem-mbo-fso-CATHERINE-shun\") is the most common type. The doctor makes a small cut in your eye. This cut is called an incision. The doctor uses a special ultrasound tool to break your cloudy lens apart. Sometimes a laser is used too. Then he or she removes the small pieces of the lens through the incision. In most cases, the doctor then inserts an artificial lens through the incision. Most people do not need stitches, because the incision is so small. If the doctor is not able to put in an artificial lens, you can wear a contact lens or thick glasses in place of your natural lens. Extracapsular extraction is a less common type of cataract surgery. The doctor makes a larger incision to remove the whole lens at once. After the doctor removes the lens, he or she stitches up the incision. Recovery from this type of surgery takes longer. Before either surgery, the doctor puts numbing drops in your eye. Some doctors use a shot instead. You may also get medicine to make you feel relaxed. You probably will not feel much pain. The surgery takes about 20 to 40 minutes. After surgery, you may have a bandage or shield on your eye. You will probably go home from surgery after 1 hour in the recovery room. Most people see better in 1 to 3 days. You may be able to go back to work or your normal routine in a few days. It could take 3 to 10 weeks for your eye to completely heal. After your eye heals, you may still need to wear glasses, especially for reading. Follow-up care is a key part of your treatment and safety. Be sure to make and go to all appointments, and call your doctor if you are having problems. It's also a good idea to know your test results and keep a list of the medicines you take. What happens before surgery? 
 Surgery can be stressful. This information will help you understand what you can expect. And it will help you safely prepare for surgery. 
 Preparing for surgery 
  · Understand exactly what surgery is planned, along with the risks, benefits, and other options. · Tell your doctors ALL the medicines, vitamins, supplements, and herbal remedies you take. Some of these can increase the risk of bleeding or interact with anesthesia.  
  · If you take blood thinners, such as warfarin (Coumadin), clopidogrel (Plavix), or aspirin, be sure to talk to your doctor. He or she will tell you if you should stop taking these medicines before your surgery. Make sure that you understand exactly what your doctor wants you to do.  
  · Your doctor will tell you which medicines to take or stop before your surgery. You may need to stop taking certain medicines a week or more before surgery. So talk to your doctor as soon as you can.  
  · If you have an advance directive, let your doctor know. It may include a living will and a durable power of  for health care. Bring a copy to the hospital. If you don't have one, you may want to prepare one. It lets your doctor and loved ones know your health care wishes. Doctors advise that everyone prepare these papers before any type of surgery or procedure. What happens on the day of surgery? · Follow the instructions exactly about when to stop eating and drinking. If you don't, your surgery may be canceled. If your doctor told you to take your medicines on the day of surgery, take them with only a sip of water.  
  · Take a bath or shower before you come in for your surgery. Do not apply lotions, perfumes, deodorants, or nail polish.  
  · Take off all jewelry and piercings. And take out contact lenses, if you wear them.  
 At the hospital or surgery center · Bring a picture ID.  
  · The area for surgery is often marked to make sure there are no errors.  
  · You will be kept comfortable and safe by your anesthesia provider. The anesthesia may make you sleep. Or it may just numb the area being worked on.  
  · The surgery will take about 20 to 40 minutes. Going home · Be sure you have someone to drive you home. Anesthesia and pain medicine make it unsafe for you to drive.  
  · You will be given more specific instructions about recovering from your surgery. They will cover things like diet, wound care, follow-up care, driving, and getting back to your normal routine.  
  · You may have a bandage or patch over your eye. You may also have a clear shield over your eye. This prevents you from rubbing it. When should you call your doctor? · You have questions or concerns.  
  · You don't understand how to prepare for your surgery.  
  · You become ill before the surgery (such as fever, flu, or a cold).  
  · You need to reschedule or have changed your mind about having the surgery. Where can you learn more? Go to http://maria r-mason.info/. Enter K474 in the search box to learn more about \"Cataract Surgery: Before Your Surgery. \" Current as of: December 3, 2017 Content Version: 11.7 © 9271-5766 [employer].  Care instructions adapted under license by [employer] (which disclaims liability or warranty for this information). If you have questions about a medical condition or this instruction, always ask your healthcare professional. [address] any warranty or liability for your use of this information. Introducing [facility]! Dear Radha Reardon: Thank you for requesting a saambaa account. Our records indicate that you already have an active saambaa account. You can access your account anytime at [email or web address]. [email or web address] Did you know that you can access your hospital and ER discharge instructions at any time in saambaa? You can also review all of your test results from your hospital stay or ER visit. Additional Information If you have questions, please visit the Frequently Asked Questions section of the saambaa website at [email or web address] Remember, saambaa is NOT to be used for urgent needs. For medical emergencies, dial 911. Now available from your iPhone and Android! Please provide this summary of care documentation to your next provider. Your primary care clinician is listed as Therese Ballesteros. If you have any questions after today's visit, please call [phone].

## 2019-01-29 ENCOUNTER — OFFICE VISIT (OUTPATIENT)
Dept: INTERNAL MEDICINE CLINIC | Age: 79
End: 2019-01-29

## 2019-01-29 ENCOUNTER — HOSPITAL ENCOUNTER (OUTPATIENT)
Dept: LAB | Age: 79
Discharge: HOME | End: 2019-01-29
Payer: MEDICARE

## 2019-01-29 VITALS
SYSTOLIC BLOOD PRESSURE: 112 MMHG | WEIGHT: 127.8 LBS | DIASTOLIC BLOOD PRESSURE: 65 MMHG | RESPIRATION RATE: 18 BRPM | HEIGHT: 66 IN | OXYGEN SATURATION: 95 % | TEMPERATURE: 97.4 F | HEART RATE: 82 BPM | BODY MASS INDEX: 20.54 KG/M2

## 2019-01-29 DIAGNOSIS — I10 ESSENTIAL HYPERTENSION: ICD-10-CM

## 2019-01-29 DIAGNOSIS — Z00.00 MEDICARE ANNUAL WELLNESS VISIT, SUBSEQUENT: Primary | ICD-10-CM

## 2019-01-29 DIAGNOSIS — R26.89 BALANCE DISORDER: ICD-10-CM

## 2019-01-29 DIAGNOSIS — R53.83 FATIGUE, UNSPECIFIED TYPE: ICD-10-CM

## 2019-01-29 DIAGNOSIS — I25.10 CORONARY ARTERY DISEASE INVOLVING NATIVE HEART WITHOUT ANGINA PECTORIS, UNSPECIFIED VESSEL OR LESION TYPE: ICD-10-CM

## 2019-01-29 DIAGNOSIS — G62.9 NEUROPATHY: ICD-10-CM

## 2019-01-29 DIAGNOSIS — J44.1 COPD EXACERBATION (HCC): ICD-10-CM

## 2019-01-29 DIAGNOSIS — E53.8 B12 DEFICIENCY: ICD-10-CM

## 2019-01-29 DIAGNOSIS — R25.1 TREMOR: ICD-10-CM

## 2019-01-29 PROCEDURE — 80061 LIPID PANEL: CPT

## 2019-01-29 PROCEDURE — 36415 COLL VENOUS BLD VENIPUNCTURE: CPT

## 2019-01-29 PROCEDURE — 82607 VITAMIN B-12: CPT

## 2019-01-29 PROCEDURE — 80053 COMPREHEN METABOLIC PANEL: CPT

## 2019-01-29 PROCEDURE — 84443 ASSAY THYROID STIM HORMONE: CPT

## 2019-01-29 RX ORDER — TURMERIC 400 MG
CAPSULE ORAL
COMMUNITY

## 2019-01-29 RX ORDER — LISINOPRIL 10 MG/1
10 TABLET ORAL DAILY
Qty: 90 TAB | Refills: 1 | Status: SHIPPED | OUTPATIENT
Start: 2019-01-29 | End: 2019-08-01 | Stop reason: SDUPTHER

## 2019-01-29 NOTE — PATIENT INSTRUCTIONS
Medicare Wellness Visit, Female The best way to live healthy is to have a lifestyle where you eat a well-balanced diet, exercise regularly, limit alcohol use, and quit all forms of tobacco/nicotine, if applicable. Regular preventive services are another way to keep healthy. Preventive services (vaccines, screening tests, monitoring & exams) can help personalize your care plan, which helps you manage your own care. Screening tests can find health problems at the earliest stages, when they are easiest to treat. Yusuf Maldonado follows the current, evidence-based guidelines published by the [facility] Mario Shari ([facility]) when recommending preventive services for our patients. Because we follow these guidelines, sometimes recommendations change over time as research supports it. (For example, mammograms used to be recommended annually. Even though Medicare will still pay for an annual mammogram, the newer guidelines recommend a mammogram every two years for women of average risk.) Of course, you and your doctor may decide to screen more often for some diseases, based on your risk and your health status. Preventive services for you include: - Medicare offers their members a free annual wellness visit, which is time for you and your primary care provider to discuss and plan for your preventive service needs. Take advantage of this benefit every year! 
-All adults over the age of 72 should receive the recommended pneumonia vaccines. Current USPSTF guidelines recommend a series of two vaccines for the best pneumonia protection.  
-All adults should have a flu vaccine yearly and a tetanus vaccine every 10 years. All adults age 61 and older should receive a shingles vaccine once in their lifetime.   
-A bone mass density test is recommended when a woman turns 65 to screen for osteoporosis. This test is only recommended one time, as a screening. Some providers will use this same test as a disease monitoring tool if you already have osteoporosis. -All adults age 38-68 who are overweight should have a diabetes screening test once every three years.  
-Other screening tests and preventive services for persons with diabetes include: an eye exam to screen for diabetic retinopathy, a kidney function test, a foot exam, and stricter control over your cholesterol.  
-Cardiovascular screening for adults with routine risk involves an electrocardiogram (ECG) at intervals determined by your doctor.  
-Colorectal cancer screenings should be done for adults age 54-65 with no increased risk factors for colorectal cancer. There are a number of acceptable methods of screening for this type of cancer. Each test has its own benefits and drawbacks. Discuss with your doctor what is most appropriate for you during your annual wellness visit. The different tests include: colonoscopy (considered the best screening method), a fecal occult blood test, a fecal DNA test, and sigmoidoscopy. -Breast cancer screenings are recommended every other year for women of normal risk, age 54-69. 
-Cervical cancer screenings for women over age 72 are only recommended with certain risk factors.  
-All adults born between [facility] should be screened once for Hepatitis C. Here is a list of your current Health Maintenance items (your personalized list of preventive services) with a due date: 
Health Maintenance Due Topic Date Due  Shingles Vaccine (1 of 2) 12/16/1992  Glaucoma Screening   04/21/2017 Demian.Seek Annual Well Visit  01/16/2019

## 2019-01-29 NOTE — PROGRESS NOTES
This is the Subsequent Medicare Annual Wellness Exam, performed 12 months or more after the Initial AWV or the last Subsequent AWV I have reviewed the patient's medical history in detail and updated the computerized patient record. History Past Medical History:  
Diagnosis Date  Asthma 10/11/2012  CAD (coronary artery disease) 4/3/2018  Cancer ([facility] bladder cancer  Chronic obstructive pulmonary disease ([facility]  GERD (gastroesophageal reflux disease) 4/1/2009  
 HTN 4/1/2009  Menopausal state 4/1/2009  Serum lipid, elevated 4/1/2009 Past Surgical History:  
Procedure Laterality Date  CABG, ARTERY-VEIN, THREE  03/22/2016 Cabg x 3  
 COLONOSCOPY N/A 8/31/2016 COLONOSCOPY performed by Meng Toscano MD at [address]  2012  HX HEENT    
 wisdom teeth extraction  HX TONSILLECTOMY  HX UROLOGICAL    
 TURBT for bladder cancer; recurrent cystoscopies Current Outpatient Medications Medication Sig Dispense Refill  turmeric 400 mg cap Take  by mouth.  lisinopril (PRINIVIL, ZESTRIL) 10 mg tablet Take 1 Tab by mouth daily. 90 Tab 1  
 erythromycin base (E-MYCIN) 250 mg tablet Take 500 mg by mouth three (3) days a week.  albuterol (PROVENTIL VENTOLIN) 2.5 mg /3 mL (0.083 %) nebulizer solution by Nebulization route once.  rosuvastatin (CRESTOR) 20 mg tablet Take 20 mg by mouth nightly.  KRILL OIL PO Take 1 Cap by mouth daily.  budesonide-formoterol (SYMBICORT) 160-4.5 mcg/actuation HFA inhaler Take 2 Puffs by inhalation two (2) times a day. 3 Inhaler 1  
 magnesium 250 mg tab Take 500 mg by mouth daily.  aclidinium bromide (TUDORZA PRESSAIR) 400 mcg/actuation inhaler Take 1 Puff by inhalation daily.  cholecalciferol (VITAMIN D3) 1,000 unit tablet Take 1,000 Units by mouth daily.     
 albuterol (PROVENTIL HFA, VENTOLIN HFA) 90 mcg/actuation inhaler Take 2 Puffs by inhalation every four (4) hours as needed for Wheezing. 1 Inhaler 2  
 CALCIUM CARBONATE/VITAMIN D3 (CALCIUM + D PO) Take 1 Cap by mouth daily.  ASPIRIN 81 mg chewable tablet Take 81 mg by mouth every other day.  lisinopril-hydroCHLOROthiazide (PRINZIDE, ZESTORETIC) 10-12.5 mg per tablet Take 1 Tab by mouth daily for 90 days. 90 Tab 0 Allergies Allergen Reactions  Augmentin [Amoxicillin-Pot Clavulanate] Nausea Only Family History Problem Relation Age of Onset  Heart Attack Mother  Hypertension Mother  High Cholesterol Mother  Hypertension Father  Hypertension Brother Social History Tobacco Use  Smoking status: Former Smoker Packs/day: 1.00 Years: 20.00 Pack years: 20.00 Types: Cigarettes Last attempt to quit: 1980 Years since quittin.1  Smokeless tobacco: Never Used Substance Use Topics  Alcohol use: Yes Alcohol/week: 0.6 oz Types: 1 Glasses of wine per week Comment: daily glass of wine Patient Active Problem List  
Diagnosis Code  
 HTN (hypertension) I10  
 Serum lipids high E78.5  GERD (gastroesophageal reflux disease) K21.9  Menopausal state N95.1  Asthma J45.909  ACP (advance care planning) Z71.89  Chronic obstructive pulmonary disease ([facility] J44.9  COPD exacerbation ([facility] J44.1  Acute respiratory failure with hypoxia ([facility]) J96.01  
 Sinus tachycardia R00.0  CAD (coronary artery disease) I25.10  
 S/P CABG (coronary artery bypass graft) Z95.1  History of bladder cancer Z85.51 Depression Risk Factor Screening: PHQ over the last two weeks 2018 Little interest or pleasure in doing things Not at all Feeling down, depressed, irritable, or hopeless Not at all Total Score PHQ 2 0 Alcohol Risk Factor Screening: You do not drink alcohol or very rarely. Functional Ability and Level of Safety:  
Hearing Loss Hearing is good. Activities of Daily Living The home contains: handrails and grab bars Patient does total self care Fall Risk Fall Risk Assessment, last 12 mths 1/29/2019 Able to walk? Yes Fall in past 12 months? Yes Fall with injury? No  
Number of falls in past 12 months 2 Fall Risk Score 2 Abuse Screen Patient is not abused Cognitive Screening Evaluation of Cognitive Function: 
Has your family/caregiver stated any concerns about your memory: no 
Normal 
 
Patient Care Team  
Patient Care Team: 
Jose Gurrola MD as PCP - Ki Stock MD (Pulmonary Disease) Gerardo Harris MD as Consulting Provider (Pulmonary Disease) Assessment/Plan Education and counseling provided: 
Are appropriate based on today's review and evaluation End-of-Life planning (with patient's consent) Diagnoses and all orders for this visit: 
 
1. COPD exacerbation ([facility] 2. Coronary artery disease involving native heart without angina pectoris, unspecified vessel or lesion type 3. Essential hypertension 
-     lisinopril (PRINIVIL, ZESTRIL) 10 mg tablet; Take 1 Tab by mouth daily. 4. Balance disorder 5. Neuropathy Health Maintenance Due Topic Date Due  Shingrix Vaccine Age 50> (1 of 2) 12/16/1992  GLAUCOMA SCREENING Q2Y  04/21/2017  MEDICARE YEARLY EXAM  01/16/2019 This note will not be viewable in [address]

## 2019-01-29 NOTE — PROGRESS NOTES
HISTORY OF PRESENT ILLNESS Natalie Gastelum is a 70 y.o. female. HPI Hypertension ROS: taking medications as instructed, no medication side effects noted, no TIA's, no chest pain on exertion, no dyspnea on exertion, no swelling of ankles. New concerns:  Patient's BP in office today is 112/65. She continues to f/u with cardiologist. She inquires about cutting back on meds. Balance Disorder: Pt c/o unstable balance when walking. She notes that, when changing directions, legs are slow to adjust. She has fallen 2x and suspects that imbalance is related to cholesterol meds. She walks with walking stick when traveling. Pt declines PT at this time. Neuropathy: Pt c/o neuropathy in feet. She has been taking B12 supplements. Tremor: Pt c/o resting tremor in right hand. CAD: Stable, and well-managed with current meds. COPD: Stable, with no recent flare-ups. Denies SOB or CP. Review of Systems All other systems reviewed and are negative. Physical Exam  
Constitutional: She is oriented to person, place, and time. She appears well-developed and well-nourished. HENT:  
Head: Normocephalic and atraumatic. Right Ear: External ear normal.  
Left Ear: External ear normal.  
Nose: Nose normal.  
Mouth/Throat: Oropharynx is clear and moist.  
Eyes: Conjunctivae and EOM are normal.  
Neck: Normal range of motion. Neck supple. Carotid bruit is not present. No thyroid mass and no thyromegaly present. Cardiovascular: Normal rate, regular rhythm, S1 normal, S2 normal, normal heart sounds and intact distal pulses. Pulmonary/Chest: Effort normal and breath sounds normal.  
Abdominal: Soft. Normal appearance and bowel sounds are normal. There is no hepatosplenomegaly. There is no tenderness. Musculoskeletal: Normal range of motion. Neurological: She is alert and oriented to person, place, and time. She has normal strength. No cranial nerve deficit or sensory deficit.  Coordination normal.  
 Skin: Skin is warm, dry and intact. No abrasion and no rash noted. Psychiatric: She has a normal mood and affect. Her behavior is normal. Judgment and thought content normal.  
Nursing note and vitals reviewed. ASSESSMENT and PLAN Diagnoses and all orders for this visit: 
 
1. COPD exacerbation ([address] Stable, and well-managed. No change in medications. 2. Coronary artery disease involving native heart without angina pectoris, unspecified vessel or lesion type Stable, and well-managed. No change in medications.  
-     LIPID PANEL 3. Essential hypertension BP is at goal. Pt will hold HCTZ and continue on Lisinopril. She will continue to f/u with cardiologist.  
-     lisinopril (PRINIVIL, ZESTRIL) 10 mg tablet; Take 1 Tab by mouth daily. 
-     METABOLIC PANEL, COMPREHENSIVE 4. Balance disorder Pt will f/u with neurologist for further evaluation. Referral given. She declines PT at this time. Discussed that imbalance is likely related to neuropathy in feet. 5. Neuropathy Pt will f/u with neurologist for further evaluation. Referral given. As well as balance is getting worse- and now resting tremor ? Parkinson's -     REFERRAL TO NEUROLOGY 6. Fatigue, unspecified type Will monitor for lab results.  
-     TSH 3RD GENERATION 7. B12 deficiency Will monitor for lab results. -     VITAMIN B12 
 
8. Tremor Pt will f/u with neurologist for further evaluation. Referral given. -     REFERRAL TO NEUROLOGY Lab results and schedule of future lab studies reviewed with patient. Reviewed diet, exercise and weight control. Written by Bambi Arceo, as dictated by Valery Gann MD.  
 
Current diagnosis and concerns discussed with pt at length. Understands risks and benefits or current treatment plan and medications and accepts the treatment and medication with any possible risks.  Pt asks appropriate questions which were answered. Pt instructed to call with any concerns or problems. This note will not be viewable in [address]

## 2019-01-30 LAB
ALBUMIN SERPL-MCNC: 4.4 G/DL (ref 3.5–4.8)
ALBUMIN/GLOB SERPL: 1.8 {RATIO} (ref 1.2–2.2)
ALP SERPL-CCNC: 87 IU/L (ref 39–117)
ALT SERPL-CCNC: 17 IU/L (ref 0–32)
AST SERPL-CCNC: 21 IU/L (ref 0–40)
BILIRUB SERPL-MCNC: 0.8 MG/DL (ref 0–1.2)
BUN SERPL-MCNC: 17 MG/DL (ref 8–27)
BUN/CREAT SERPL: 18 (ref 12–28)
CALCIUM SERPL-MCNC: 9.5 MG/DL (ref 8.7–10.3)
CHLORIDE SERPL-SCNC: 97 MMOL/L (ref 96–106)
CHOLEST SERPL-MCNC: 132 MG/DL (ref 100–199)
CO2 SERPL-SCNC: 26 MMOL/L (ref 20–29)
CREAT SERPL-MCNC: 0.94 MG/DL (ref 0.57–1)
GLOBULIN SER CALC-MCNC: 2.4 G/DL (ref 1.5–4.5)
GLUCOSE SERPL-MCNC: 84 MG/DL (ref 65–99)
HDLC SERPL-MCNC: 60 MG/DL
INTERPRETATION, 910389: NORMAL
INTERPRETATION: NORMAL
LDLC SERPL CALC-MCNC: 55 MG/DL (ref 0–99)
PDF IMAGE, 910387: NORMAL
POTASSIUM SERPL-SCNC: 4.5 MMOL/L (ref 3.5–5.2)
PROT SERPL-MCNC: 6.8 G/DL (ref 6–8.5)
SODIUM SERPL-SCNC: 141 MMOL/L (ref 134–144)
TRIGL SERPL-MCNC: 86 MG/DL (ref 0–149)
TSH SERPL DL<=0.005 MIU/L-ACNC: 0.73 UIU/ML (ref 0.45–4.5)
VIT B12 SERPL-MCNC: 1173 PG/ML (ref 232–1245)
VLDLC SERPL CALC-MCNC: 17 MG/DL (ref 5–40)

## 2019-03-08 ENCOUNTER — OFFICE VISIT (OUTPATIENT)
Dept: NEUROLOGY | Age: 79
End: 2019-03-08

## 2019-03-08 VITALS
HEART RATE: 89 BPM | HEIGHT: 64 IN | BODY MASS INDEX: 21.72 KG/M2 | DIASTOLIC BLOOD PRESSURE: 110 MMHG | RESPIRATION RATE: 18 BRPM | SYSTOLIC BLOOD PRESSURE: 160 MMHG | OXYGEN SATURATION: 94 % | WEIGHT: 127.2 LBS

## 2019-03-08 DIAGNOSIS — G20 PARKINSON'S DISEASE (HCC): Primary | ICD-10-CM

## 2019-03-08 DIAGNOSIS — R26.9 GAIT ABNORMALITY: ICD-10-CM

## 2019-03-08 RX ORDER — AZITHROMYCIN 250 MG/1
250 TABLET, FILM COATED ORAL
COMMUNITY
End: 2021-01-27 | Stop reason: ALTCHOICE

## 2019-03-08 RX ORDER — CARBIDOPA AND LEVODOPA 25; 100 MG/1; MG/1
TABLET ORAL
Qty: 120 TAB | Refills: 3 | Status: SHIPPED | OUTPATIENT
Start: 2019-03-08 | End: 2019-04-30 | Stop reason: SDUPTHER

## 2019-03-08 NOTE — PROGRESS NOTES
Chief Complaint   Patient presents with    New Patient    Tremors     shaking in hands, balance issues  for the past 6 mo     Works out 3 days per week, has muscle weakness

## 2019-03-08 NOTE — PATIENT INSTRUCTIONS

## 2019-03-09 NOTE — PROGRESS NOTES
[facility] and [address] at [facility] at [address] [facility], [address] [facility] [address]  [phone] Office  [phone] Facsimile           Referring: Maryann Pimentel MD      Chief Complaint   Patient presents with    New Patient    Tremors     shaking in hands, balance issues  for the past 10mo     70-year-old right-handed woman who presents today coming by her  for evaluation of what she calls hand shaking and balance trouble. She tells me that she started to notice her left upper extremity shaking may be 6 months or so ago. She then saw Dr. Flores Sever her routine physical appointment and she also noticed the patient was shaking at rest and was concerned and has referred her for neurological evaluation. The patient tells me she notices shaking of her left upper extremity at rest.  She is more concerned about her balance. She says that she has difficulty with walking and she feels like she is shuffles. She has difficulty when she turns and she is fallen x2 both times when she is tried to make turns. She says that she works out frequently and she is just not sure why this happens. The only time she notices the tremor is at rest.  She has not had any new medications. She is never had any neuroleptics. No Reglan or other medicines of that type. Again no medicine changes. She has not had any injury. No chest pain. No shortness of breath. No fever. No chills. She does endorse some constipation. No family history of anything like this. No toxic exposures or exposures to heavy metals.     Past Medical History:   Diagnosis Date    Asthma 10/11/2012    CAD (coronary artery disease) 4/3/2018    Cancer [facility])     bladder cancer    Chronic obstructive pulmonary disease (HCC)     GERD (gastroesophageal reflux disease) 4/1/2009    HTN 4/1/2009    Menopausal state 4/1/2009    Serum lipid, elevated 4/1/2009       Past Surgical History:   Procedure Laterality Date    CABG, ARTERY-VEIN, THREE  03/22/2016    Cabg x 3    COLONOSCOPY N/A 8/31/2016    COLONOSCOPY performed by Malick Dale MD at [address] [facility] HX CHOLECYSTECTOMY  2012    HX HEENT      wisdom teeth extraction    HX TONSILLECTOMY      HX UROLOGICAL      TURBT for bladder cancer; recurrent cystoscopies       Current Outpatient Medications   Medication Sig Dispense Refill    azithromycin (ZITHROMAX) 250 mg tablet Take 250 mg by mouth three (3) days a week.  carbidopa-levodopa (SINEMET)  mg per tablet 1 po at 7am, 11 am, 3pm and 7pm 120 Tab 3    lisinopril (PRINIVIL, ZESTRIL) 10 mg tablet Take 1 Tab by mouth daily. 90 Tab 1    erythromycin base (E-MYCIN) 250 mg tablet Take 500 mg by mouth three (3) days a week.  KRILL OIL PO Take 1 Cap by mouth daily.  budesonide-formoterol (SYMBICORT) 160-4.5 mcg/actuation HFA inhaler Take 2 Puffs by inhalation two (2) times a day. 3 Inhaler 1    magnesium 250 mg tab Take 500 mg by mouth daily.  aclidinium bromide (TUDORZA PRESSAIR) 400 mcg/actuation inhaler Take 1 Puff by inhalation daily.  cholecalciferol (VITAMIN D3) 1,000 unit tablet Take 1,000 Units by mouth daily.  albuterol (PROVENTIL HFA, VENTOLIN HFA) 90 mcg/actuation inhaler Take 2 Puffs by inhalation every four (4) hours as needed for Wheezing. 1 Inhaler 2    CALCIUM CARBONATE/VITAMIN D3 (CALCIUM + D PO) Take 1 Cap by mouth daily.  ASPIRIN 81 mg chewable tablet Take 81 mg by mouth every other day.  turmeric 400 mg cap Take  by mouth.  albuterol (PROVENTIL VENTOLIN) 2.5 mg /3 mL (0.083 %) nebulizer solution by Nebulization route once.  rosuvastatin (CRESTOR) 20 mg tablet Take 20 mg by mouth nightly.  lisinopril-hydroCHLOROthiazide (PRINZIDE, ZESTORETIC) 10-12.5 mg per tablet Take 1 Tab by mouth daily for 90 days.  90 Tab 0 Allergies   Allergen Reactions    Augmentin [Amoxicillin-Pot Clavulanate] Nausea Only       Social History     Tobacco Use    Smoking status: Former Smoker     Packs/day: 1.00     Years: 20.00     Pack years: 20.00     Types: Cigarettes     Last attempt to quit: 1980     Years since quittin.2    Smokeless tobacco: Never Used   Substance Use Topics    Alcohol use: Yes     Alcohol/week: 0.6 oz     Types: 1 Glasses of wine per week     Comment: daily glass of wine    Drug use: No       Family History   Problem Relation Age of Onset    Heart Attack Mother     Hypertension Mother     High Cholesterol Mother     Hypertension Father     Hypertension Brother        Review of Systems  Pertinent positives and negatives as noted with remainder of comprehensive review negative    Examination  Visit Vitals  BP (!) 160/110 (BP 1 Location: Left arm, BP Patient Position: Sitting) Comment: recently taken off of hctz   Pulse 89   Resp 18   Ht 5' 4\" (1.626 m)   Wt 57.7 kg (127 lb 3.2 oz)   SpO2 94%   BMI 21.83 kg/m²     Pleasant thin lady. Appropriate dress and grooming. No icterus. Her oropharynx is clear moist.  Her neck is supple without bruit. Her heart is regular. I do not hear a murmur and her pulses are symmetrical.  She has no edema of the lower extremities. Neurologically she is awake alert oriented and conversant. Her speech and language are normal.  Her cognitive function is normal.  Her cranial nerves are intact 2-12 and she has no limited gaze aversion. She has no nystagmus. Her pursuit is smooth. She has no pronation and no drift. She has cogwheeling at the wrists bilaterally. She has rest tremor of her left upper extremity and she has bilateral lower extremity rest tremor right greater than left. She has postural reemergence tremor of the outstretched hands. No true postural tremor. No action tremor.   She resists fully in the upper and lower extremities in all muscle groups to direct testing. Reflexes are symmetrical in the upper and lower extremities bilaterally. She ambulates with a shuffle and a slight hunch. Her tremor is accentuated with ambulation in the left. She tries to put her hand in her pocket in order to hide that. She takes a 7 step turn and she is unsteady. She is retropulsive 2/3 tries. No ataxia. Sensory is intact to primary modalities. Impression/Plan  Patient has Parkinson's disease. We discussed with Parkinson's is. I drew pictures of the substantia nigra. We discussed pathophysiology. We discussed the treatment for Parkinson's disease is replacement of dopamine. We also discussed the difference between Parkinson's disease proper and parkinsonian syndromes that can mimic Parkinson's disease and the way we differentiate between those is the clinical response to dopamine and their clinical progression. She had several questions regarding side effects of medication and I point-blank told her not to read all of the side effects that she will be given in the paperwork from the pharmacy and we did discuss the most common side effects with Sinemet which is what I am prescribing today. We discussed the potential for GI upset and how to mitigate that. We also discussed that with higher doses one can experience hallucinations or orthostasis but I would not expect that with this dose. She will start 25/100 tablets of Sinemet 1/2 tablet at 7 AM, 11 AM and 3 PM and she can also take a 7 PM dose if she would like trying to mimic a more neurologic way of dopamine release.   We discussed to the expectation and that the tremor may get better although tremor can be more difficult to control but what I really want to look for is improvement in her balance and her gait and the primary reason we are treating her is difficulty with her balance and her gait and the fact that she is unsteady in her return and the fact that she is fallen twice and the fact that here in the office when she tried to turn taking 7 steps she almost fell. She will return to see me in about 6 weeks and we will make adjustments as needed. Questions and concerns addressed today. Corinne Kelley MD    This note was created using voice recognition software. Despite editing, there may be syntax errors. This note will not be viewable in [address]

## 2019-03-17 RX ORDER — OMEPRAZOLE 20 MG/1
20 CAPSULE, DELAYED RELEASE ORAL DAILY
Qty: 90 CAP | Refills: 1 | Status: SHIPPED | OUTPATIENT
Start: 2019-03-17 | End: 2020-10-07 | Stop reason: SDUPTHER

## 2019-04-02 DIAGNOSIS — G20 PARKINSON'S DISEASE (HCC): Primary | ICD-10-CM

## 2019-04-30 ENCOUNTER — OFFICE VISIT (OUTPATIENT)
Dept: NEUROLOGY | Age: 79
End: 2019-04-30

## 2019-04-30 VITALS
BODY MASS INDEX: 21.83 KG/M2 | OXYGEN SATURATION: 96 % | HEIGHT: 64 IN | HEART RATE: 58 BPM | SYSTOLIC BLOOD PRESSURE: 136 MMHG | RESPIRATION RATE: 20 BRPM | DIASTOLIC BLOOD PRESSURE: 64 MMHG

## 2019-04-30 DIAGNOSIS — G20 PARKINSON'S DISEASE (HCC): Primary | ICD-10-CM

## 2019-04-30 RX ORDER — ASCORBIC ACID 500 MG
TABLET ORAL
COMMUNITY

## 2019-04-30 RX ORDER — BISMUTH SUBSALICYLATE 262 MG
1 TABLET,CHEWABLE ORAL DAILY
COMMUNITY
End: 2020-01-30

## 2019-04-30 RX ORDER — CARBIDOPA AND LEVODOPA 25; 100 MG/1; MG/1
TABLET ORAL
Qty: 120 TAB | Refills: 3 | Status: SHIPPED | OUTPATIENT
Start: 2019-04-30 | End: 2019-07-30 | Stop reason: SDUPTHER

## 2019-04-30 NOTE — PROGRESS NOTES
Tremors- the medication has helped tremendously     She wanted to know if balance physical therapy would help her balance, at the time she was worried about it, stated  never got back with her     Balance is better now so she is not really worried at the moment just wanted to ask when it was a concern

## 2019-04-30 NOTE — PROGRESS NOTES
[facility] and  [address] at [facility] at [address] [place] [address], [id number] [place] [address] [address]  [phone] Office  [phone] Facsimile           Date:  19     Name:  Elicia Motta  :  1940  MRN:  [id number]     PCP:  Mya Mauricio MD    Chief Complaint   Patient presents with    Follow-up     HISTORY OF PRESENT ILLNESS: Follow up for Parkinson's disease. At her initial visit with Dr. Rohith Serna, she was started on Sinemet for treatment of the tremor and balance issues. She indicates today that this has helped tremendously. Her balance is significantly better since starting the Sinemet. The tremor is less noticeable and is not bothersome. She denies muscle stiffness, falls, slowness, freezing or wearing off and off time. No issues with voice quality or swallowing. She has not had any sudden, erratic movements. Denies issues with memory loss, depression/anxiety, difficulty sleeping, issues with talking in sleep or acting out dreams, hallucinations/delusions. No constipation or nausea. no lightheadedness or positional dizziness. No compulsive behaviors/urges. She is independent with ADLs. Current Outpatient Medications   Medication Sig    multivitamin (ONE A DAY) tablet Take 1 Tab by mouth daily.  ascorbic acid, vitamin C, (VITAMIN C) 500 mg tablet Take  by mouth.  omeprazole (PRILOSEC) 20 mg capsule Take 1 Cap by mouth daily for 90 days. This to replace script for tablets    azithromycin (ZITHROMAX) 250 mg tablet Take 250 mg by mouth three (3) days a week.  carbidopa-levodopa (SINEMET)  mg per tablet 1 po at 7am, 11 am, 3pm and 7pm    turmeric 400 mg cap Take  by mouth.  lisinopril (PRINIVIL, ZESTRIL) 10 mg tablet Take 1 Tab by mouth daily.     albuterol (PROVENTIL VENTOLIN) 2.5 mg /3 mL (0.083 %) nebulizer solution by Nebulization route once.  rosuvastatin (CRESTOR) 20 mg tablet Take 20 mg by mouth nightly.  KRILL OIL PO Take 1 Cap by mouth daily.  budesonide-formoterol (SYMBICORT) 160-4.5 mcg/actuation HFA inhaler Take 2 Puffs by inhalation two (2) times a day.  magnesium 250 mg tab Take 500 mg by mouth daily.  aclidinium bromide (TUDORZA PRESSAIR) 400 mcg/actuation inhaler Take 1 Puff by inhalation daily.  cholecalciferol (VITAMIN D3) 1,000 unit tablet Take 1,000 Units by mouth daily.  albuterol (PROVENTIL HFA, VENTOLIN HFA) 90 mcg/actuation inhaler Take 2 Puffs by inhalation every four (4) hours as needed for Wheezing.  CALCIUM CARBONATE/VITAMIN D3 (CALCIUM + D PO) Take 1 Cap by mouth daily.  ASPIRIN 81 mg chewable tablet Take 81 mg by mouth every other day.  lisinopril-hydroCHLOROthiazide (PRINZIDE, ZESTORETIC) 10-12.5 mg per tablet Take 1 Tab by mouth daily for 90 days. No current facility-administered medications for this visit.       Allergies   Allergen Reactions    Augmentin [Amoxicillin-Pot Clavulanate] Nausea Only     Past Medical History:   Diagnosis Date    Asthma 10/11/2012    CAD (coronary artery disease) 4/3/2018    Cancer ([facility] [address]     bladder cancer    Chronic obstructive pulmonary disease (HCC)     GERD (gastroesophageal reflux disease) 4/1/2009    HTN 4/1/2009    Menopausal state 4/1/2009    Serum lipid, elevated 4/1/2009     Past Surgical History:   Procedure Laterality Date    CABG, ARTERY-VEIN, THREE  03/22/2016    Cabg x 3    COLONOSCOPY N/A 8/31/2016    COLONOSCOPY performed by Vicky Hudson MD at [address] [facility] HX CHOLECYSTECTOMY  2012    HX HEENT      wisdom teeth extraction    HX TONSILLECTOMY      HX UROLOGICAL      TURBT for bladder cancer; recurrent cystoscopies     Social History     Socioeconomic History    Marital status:      Spouse name: Not on file    Number of children: Not on file    Years of education: Not on file    Highest education level: Not on file   Occupational History    Not on file   Social Needs    Financial resource strain: Not on file    Food insecurity:     Worry: Not on file     Inability: Not on file    Transportation needs:     Medical: Not on file     Non-medical: Not on file   Tobacco Use    Smoking status: Former Smoker     Packs/day: 1.00     Years: 20.00     Pack years: 20.00     Types: Cigarettes     Last attempt to quit: 1980     Years since quittin.3    Smokeless tobacco: Never Used   Substance and Sexual Activity    Alcohol use: Yes     Alcohol/week: 0.6 oz     Types: 1 Glasses of wine per week     Comment: daily glass of wine    Drug use: No    Sexual activity: Never   Lifestyle    Physical activity:     Days per week: Not on file     Minutes per session: Not on file    Stress: Not on file   Relationships    Social connections:     Talks on phone: Not on file     Gets together: Not on file     Attends Religion service: Not on file     Active member of club or organization: Not on file     Attends meetings of clubs or organizations: Not on file     Relationship status: Not on file    Intimate partner violence:     Fear of current or ex partner: Not on file     Emotionally abused: Not on file     Physically abused: Not on file     Forced sexual activity: Not on file   Other Topics Concern    Not on file   Social History Narrative    Not on file     Family History   Problem Relation Age of Onset    Heart Attack Mother     Hypertension Mother     High Cholesterol Mother     Hypertension Father     Hypertension Brother        PHYSICAL EXAMINATION:    Visit Vitals  /64   Pulse (!) 58   Resp 20   Ht 5' 4\" (1.626 m)   SpO2 96%   BMI 21.83 kg/m²     General:  Well defined, nourished, and groomed individual in no acute distress. Neck: Supple, nontender, no bruits, no pain with resistance to active range of motion. Heart: Regular rate and rhythm, no murmurs, rub, or gallop.   Normal S1S2.  Lungs:  Clear to auscultation bilaterally with equal chest expansion, no cough, no wheeze  Musculoskeletal:  Extremities revealed no edema and had full range of motion of joints. Psych:  Good mood and bright affect    NEUROLOGICAL EXAMINATION:     Mental Status:   Alert and oriented to person, place, and time with recent and remote memory intact. Attention span and concentration are normal. Speech is fluent with a full fund of knowledge. Cranial Nerves:    II, III, IV, VI:  Visual acuity grossly intact. Visual fields are normal.    Pupils are equal, round, and reactive to light and accommodation. Extra-ocular movements are full and fluid. Fundoscopic exam was benign, no ptosis or nystagmus. V-XII: Hearing is grossly intact. Facial features are symmetric, with normal sensation and strength. The palate rises symmetrically and the tongue protrudes midline. Sternocleidomastoids 5/5. Motor Examination: Normal tone, bulk, and strength, 5/5 muscle strength throughout. No cogwheel rigidity     Coordination: Finger to nose and rapid arm movement testing with mild bradykinesia and left sided dysmetria. No significant resting tremor. Bilateral minimal intention tremor    Gait and Station:  Mildly shuffled gait with a decreased bilateral arm swing. There was attenuation of the left hand tremor with walking. No en bloc turning. No pronator drift. No muscle wasting or fasiculations noted. Reflexes:  DTRs 2+ throughout. ASSESSMENT AND PLAN    ICD-10-CM ICD-9-CM    1. [facility]'s [facility] G20 332.0 carbidopa-levodopa (SINEMET)  mg per tablet     We discussed the signs and symptoms of Parkinson disease, both the motor and non-motor symptoms, disease pathophysiology, progression, prognosis, and treatment which included the use of physical therapy ([facility] program) and speech therapy ([facility] program), medication, and deep brain stimulation.  She will continue with Sinemet as previously prescribed.  Follow up in three months    [place], NP

## 2019-07-30 ENCOUNTER — OFFICE VISIT (OUTPATIENT)
Dept: NEUROLOGY | Age: 79
End: 2019-07-30

## 2019-07-30 VITALS
SYSTOLIC BLOOD PRESSURE: 144 MMHG | HEART RATE: 77 BPM | TEMPERATURE: 98.1 F | BODY MASS INDEX: 21.83 KG/M2 | DIASTOLIC BLOOD PRESSURE: 80 MMHG | RESPIRATION RATE: 20 BRPM | HEIGHT: 64 IN | OXYGEN SATURATION: 97 %

## 2019-07-30 DIAGNOSIS — G20 PARKINSON'S DISEASE (HCC): ICD-10-CM

## 2019-07-30 RX ORDER — RASAGILINE 1 MG/1
TABLET ORAL
Qty: 90 TAB | Refills: 2 | Status: SHIPPED | OUTPATIENT
Start: 2019-07-30 | End: 2019-10-30 | Stop reason: SDUPTHER

## 2019-07-30 RX ORDER — CARBIDOPA AND LEVODOPA 25; 100 MG/1; MG/1
TABLET ORAL
Qty: 120 TAB | Refills: 3 | Status: SHIPPED | OUTPATIENT
Start: 2019-07-30 | End: 2019-10-30 | Stop reason: SDUPTHER

## 2019-07-30 NOTE — PROGRESS NOTES
[facility] and  [address] at [facility] at [address] [place] [address], [id number] [place] [address] [address]  [phone] Office  [phone] Facsimile           Date:  19     Name:  Janice Dyer  :  1940  MRN:  [id number]     PCP:  Chris Hayden MD    Chief Complaint   Patient presents with    Parkinsons Disease     HISTORY OF PRESENT ILLNESS: Mrs. Meli Eddy is a 70-year-old, right-handed,  woman who presents today for follow-up of Parkinson's disease. She continues taking Sinemet 25/100 3 times daily at 4-hour intervals. She indicates that she has been doing quite well. Does not feel that there have been any changes since her last office visit. Her balance and tremors which were her primary symptoms are much better since starting the Sinemet and the tremor is barely noticeable and certainly not bothersome. Today she mentions that her legs will feel stiff and heavy particularly if she has been sitting for a while. She denies falls, slowness, freezing or wearing off and off time. No issues with voice quality or swallowing. She has not had any sudden, erratic movements. Denies issues with memory loss, depression/anxiety, difficulty sleeping, issues with talking in sleep or acting out dreams, hallucinations/delusions. No constipation or nausea. no lightheadedness or positional dizziness. No compulsive behaviors/urges. She is independent with ADLs. Current Outpatient Medications   Medication Sig    multivitamin (ONE A DAY) tablet Take 1 Tab by mouth daily.  ascorbic acid, vitamin C, (VITAMIN C) 500 mg tablet Take  by mouth.  carbidopa-levodopa (SINEMET)  mg per tablet 1 po at 7am, 11 am, 3pm and 7pm    azithromycin (ZITHROMAX) 250 mg tablet Take 250 mg by mouth three (3) days a week.  turmeric 400 mg cap Take  by mouth.     lisinopril (PRINIVIL, ZESTRIL) 10 mg tablet Take 1 Tab by mouth daily.  albuterol (PROVENTIL VENTOLIN) 2.5 mg /3 mL (0.083 %) nebulizer solution by Nebulization route once.  rosuvastatin (CRESTOR) 20 mg tablet Take 20 mg by mouth nightly.  lisinopril-hydroCHLOROthiazide (PRINZIDE, ZESTORETIC) 10-12.5 mg per tablet Take 1 Tab by mouth daily for 90 days.  KRILL OIL PO Take 1 Cap by mouth daily.  budesonide-formoterol (SYMBICORT) 160-4.5 mcg/actuation HFA inhaler Take 2 Puffs by inhalation two (2) times a day.  magnesium 250 mg tab Take 500 mg by mouth daily.  aclidinium bromide (TUDORZA PRESSAIR) 400 mcg/actuation inhaler Take 1 Puff by inhalation daily.  cholecalciferol (VITAMIN D3) 1,000 unit tablet Take 1,000 Units by mouth daily.  albuterol (PROVENTIL HFA, VENTOLIN HFA) 90 mcg/actuation inhaler Take 2 Puffs by inhalation every four (4) hours as needed for Wheezing.  CALCIUM CARBONATE/VITAMIN D3 (CALCIUM + D PO) Take 1 Cap by mouth daily.  ASPIRIN 81 mg chewable tablet Take 81 mg by mouth every other day. No current facility-administered medications for this visit.       Allergies   Allergen Reactions    Augmentin [Amoxicillin-Pot Clavulanate] Nausea Only     Past Medical History:   Diagnosis Date    Asthma 10/11/2012    CAD (coronary artery disease) 4/3/2018    Cancer ([facility]     bladder cancer    Chronic obstructive pulmonary disease (HCC)     GERD (gastroesophageal reflux disease) 4/1/2009    HTN 4/1/2009    Menopausal state 4/1/2009    Serum lipid, elevated 4/1/2009     Past Surgical History:   Procedure Laterality Date    CABG, ARTERY-VEIN, THREE  03/22/2016    Cabg x 3    COLONOSCOPY N/A 8/31/2016    [facility] HX CHOLECYSTECTOMY  2012    HX HEENT      wisdom teeth extraction    HX TONSILLECTOMY      HX UROLOGICAL      TURBT for bladder cancer; recurrent cystoscopies     Social History     Socioeconomic History    Marital status:      Spouse name: Not on file    Number of children: Not on file    Years of education: Not on file    Highest education level: Not on file   Occupational History    Not on file   Social Needs    Financial resource strain: Not on file    Food insecurity:     Worry: Not on file     Inability: Not on file    Transportation needs:     Medical: Not on file     Non-medical: Not on file   Tobacco Use    Smoking status: Former Smoker     Packs/day: 1.00     Years: 20.00     Pack years: 20.00     Types: Cigarettes     Last attempt to quit: 1980     Years since quittin.6    Smokeless tobacco: Never Used   Substance and Sexual Activity    Alcohol use:  Yes     Alcohol/week: 1.0 standard drinks     Types: 1 Glasses of wine per week     Comment: daily glass of wine    Drug use: No    Sexual activity: Never   Lifestyle    Physical activity:     Days per week: Not on file     Minutes per session: Not on file    Stress: Not on file   Relationships    Social connections:     Talks on phone: Not on file     Gets together: Not on file     Attends Anabaptist service: Not on file     Active member of club or organization: Not on file     Attends meetings of clubs or organizations: Not on file     Relationship status: Not on file    Intimate partner violence:     Fear of current or ex partner: Not on file     Emotionally abused: Not on file     Physically abused: Not on file     Forced sexual activity: Not on file   Other Topics Concern    Not on file   Social History Narrative    Not on file     Family History   Problem Relation Age of Onset    Heart Attack Mother     Hypertension Mother     High Cholesterol Mother     Hypertension Father     Hypertension Brother        PHYSICAL EXAMINATION:    Visit Vitals  /80   Pulse 77   Temp 98.1 °F (36.7 °C) (Oral)   Resp 20   Ht 5' 4\" (1.626 m)   SpO2 97%   BMI 21.83 kg/m²     General:  Well defined, nourished, and groomed individual in no acute distress. Neck: Supple, nontender, no bruits, no pain with resistance to active range of motion. Heart: Regular rate and rhythm, no murmurs, rub, or gallop. Normal S1S2. Lungs:  Clear to auscultation bilaterally with equal chest expansion, no cough, no wheeze  Musculoskeletal:  Extremities revealed no edema and had full range of motion of joints. Psych:  Good mood and bright affect    NEUROLOGICAL EXAMINATION:     Mental Status:   Alert and oriented to person, place, and time with recent and remote memory intact. Attention span and concentration are normal. Speech is fluent with a full fund of knowledge. Cranial Nerves:    II, III, IV, VI:  Visual acuity grossly intact. Visual fields are normal.    Pupils are equal, round, and reactive to light and accommodation. Extra-ocular movements are full and fluid. Fundoscopic exam was benign, no ptosis or nystagmus. V-XII: Hearing is grossly intact. Facial features are symmetric, with normal sensation and strength. The palate rises symmetrically and the tongue protrudes midline. Sternocleidomastoids 5/5. Motor Examination: Normal tone, bulk, and strength, 5/5 muscle strength throughout. No cogwheel rigidity     Coordination: Finger to nose and rapid arm movement testing with mild bradykinesia and left sided dysmetria. No significant resting tremor. Bilateral minimal intention tremor    Gait and Station:  Steady gait with a decreased bilateral arm swing. She does have some mild stiffness. There was attenuation of the left hand tremor with walking. No en bloc turning. No pronator drift. No muscle wasting or fasiculations noted. Reflexes:  DTRs 2+ throughout. ASSESSMENT AND PLAN    ICD-10-CM ICD-9-CM    1. [facility]'s [facility] G20 332.0 carbidopa-levodopa (SINEMET)  mg per tablet      rasagiline (AZILECT) 1 mg tablet     Again discussed diagnosis of Parkinson's disease.  She had specific questions regarding progression and what she might have to look forward too given that she knows of two friends who had the disease and had issues with dementia, hallucination, and delusions. Tried to provide some reassurance that this is not necessarily going to be her course. While these issues are possible, they do not occur for all patients. She is significantly better since starting the Sinemet. However, she did mention today that her legs have felt stiff and heavy. Will add Azilect for additional management. Purpose and potential side effects were discussed and she has verbalized understanding. Follow up in three months    Helio Washington NP    More than 50% of today's 40+ minute office visit was spent in education and reassurance regarding Parkinson's disease diagnosis, progression, and prognosis.

## 2019-07-30 NOTE — PROGRESS NOTES
Parkinson's disease- she has been doing fine   No changes that she knows of   She thinks the medication is working well

## 2019-08-01 DIAGNOSIS — I10 ESSENTIAL HYPERTENSION: ICD-10-CM

## 2019-08-01 RX ORDER — LISINOPRIL 10 MG/1
TABLET ORAL
Qty: 90 TAB | Refills: 1 | Status: SHIPPED | OUTPATIENT
Start: 2019-08-01 | End: 2020-02-13

## 2019-10-30 ENCOUNTER — OFFICE VISIT (OUTPATIENT)
Dept: NEUROLOGY | Age: 79
End: 2019-10-30

## 2019-10-30 VITALS
HEIGHT: 64 IN | HEART RATE: 69 BPM | DIASTOLIC BLOOD PRESSURE: 62 MMHG | BODY MASS INDEX: 21.17 KG/M2 | OXYGEN SATURATION: 95 % | SYSTOLIC BLOOD PRESSURE: 110 MMHG | WEIGHT: 124 LBS

## 2019-10-30 DIAGNOSIS — G60.9 IDIOPATHIC PERIPHERAL NEUROPATHY: ICD-10-CM

## 2019-10-30 DIAGNOSIS — G20 PARKINSON'S DISEASE (HCC): Primary | ICD-10-CM

## 2019-10-30 RX ORDER — RASAGILINE 1 MG/1
TABLET ORAL
Qty: 90 TAB | Refills: 2 | Status: SHIPPED | OUTPATIENT
Start: 2019-10-30 | End: 2020-09-02

## 2019-10-30 RX ORDER — CARBIDOPA AND LEVODOPA 25; 100 MG/1; MG/1
TABLET ORAL
Qty: 120 TAB | Refills: 3 | Status: SHIPPED | OUTPATIENT
Start: 2019-10-30 | End: 2020-01-30 | Stop reason: SDUPTHER

## 2019-10-30 NOTE — PROGRESS NOTES
[facility] and  [address] at [facility] at [address] [facility], [address] [place] [address] [address] [facility], [address] [place] [address]  [phone] Office  [phone] Facsimile           Date:  10/30/19     Name:  Lane Zayas  :  1940  MRN:  [id number]     PCP:  Quinten Pepper MD    Chief Complaint   Patient presents with    Parkinsons Disease     HISTORY OF PRESENT ILLNESS: Mrs. Radha Merritt is a 74-year-old, right-handed,  woman who presents today for follow-up of Parkinson's disease. She continues taking Sinemet 25/100 3 times daily at 4-hour intervals. In addition to this, she was started on Azilect due to an increase in leg stiffness which did help. She still has some stiffness despite this. She indicates that as long as she is moving, she is okay. Otherwise, she feels like she does not have any energy and if she sits she will doze off easily. She states that this is not her main issue. She indicates that she is having issues with her feet. They feel like they are burning and are painful. This has been present for a couple of years but from what she has read about peripheral neuropathy, there is not anything that can really be done about it. She has also had some increased blurred vision which she has noticed when she reads or tries to look at something up close. Her balance and tremors which were her primary symptoms are much better since starting the Sinemet and the tremor is barely noticeable and certainly not bothersome. Today she mentions that her legs will feel stiff and heavy particularly if she has been sitting for a while. She denies falls, slowness, freezing or wearing off and off time. No issues with voice quality or swallowing. She has not had any sudden, erratic movements.  Denies issues with memory loss, depression/anxiety, difficulty sleeping, issues with talking in sleep or acting out dreams, hallucinations/delusions. No constipation or nausea. no lightheadedness or positional dizziness. No compulsive behaviors/urges. She is independent with ADLs. Recap from last office visit:  Again discussed diagnosis of Parkinson's disease. She had specific questions regarding progression and what she might have to look forward too given that she knows of two friends who had the disease and had issues with dementia, hallucination, and delusions. Tried to provide some reassurance that this is not necessarily going to be her course. While these issues are possible, they do not occur for all patients. She is significantly better since starting the Sinemet. However, she did mention today that her legs have felt stiff and heavy. Will add Azilect for additional management. Purpose and potential side effects were discussed and she has verbalized understanding. Current Outpatient Medications   Medication Sig    lisinopril (PRINIVIL, ZESTRIL) 10 mg tablet TAKE 1 TABLET BY MOUTH ONCE DAILY    carbidopa-levodopa (SINEMET)  mg per tablet 1 po at 7am, 11 am, 3pm and 7pm    rasagiline (AZILECT) 1 mg tablet Take one half tablet daily for one week then increase to one tablet daily thereafter    multivitamin (ONE A DAY) tablet Take 1 Tab by mouth daily.  ascorbic acid, vitamin C, (VITAMIN C) 500 mg tablet Take  by mouth.  azithromycin (ZITHROMAX) 250 mg tablet Take 250 mg by mouth three (3) days a week.  turmeric 400 mg cap Take  by mouth.  albuterol (PROVENTIL VENTOLIN) 2.5 mg /3 mL (0.083 %) nebulizer solution by Nebulization route once.  rosuvastatin (CRESTOR) 20 mg tablet Take 20 mg by mouth nightly.  lisinopril-hydroCHLOROthiazide (PRINZIDE, ZESTORETIC) 10-12.5 mg per tablet Take 1 Tab by mouth daily for 90 days.  KRILL OIL PO Take 1 Cap by mouth daily.  budesonide-formoterol (SYMBICORT) 160-4.5 mcg/actuation HFA inhaler Take 2 Puffs by inhalation two (2) times a day.  magnesium 250 mg tab Take 500 mg by mouth daily.  aclidinium bromide (TUDORZA PRESSAIR) 400 mcg/actuation inhaler Take 1 Puff by inhalation daily.  cholecalciferol (VITAMIN D3) 1,000 unit tablet Take 1,000 Units by mouth daily.  albuterol (PROVENTIL HFA, VENTOLIN HFA) 90 mcg/actuation inhaler Take 2 Puffs by inhalation every four (4) hours as needed for Wheezing.  CALCIUM CARBONATE/VITAMIN D3 (CALCIUM + D PO) Take 1 Cap by mouth daily.  ASPIRIN 81 mg chewable tablet Take 81 mg by mouth every other day. No current facility-administered medications for this visit.       Allergies   Allergen Reactions    Augmentin [Amoxicillin-Pot Clavulanate] Nausea Only     Past Medical History:   Diagnosis Date    Asthma 10/11/2012    CAD (coronary artery disease) 4/3/2018    Cancer ([facility]     bladder cancer    Chronic obstructive pulmonary disease (HCC)     GERD (gastroesophageal reflux disease) 4/1/2009    HTN 4/1/2009    Menopausal state 4/1/2009    Serum lipid, elevated 4/1/2009     Past Surgical History:   Procedure Laterality Date    CABG, ARTERY-VEIN, THREE  03/22/2016    Cabg x 3    COLONOSCOPY N/A 8/31/2016    COLONOSCOPY performed by Imer Florian MD at [address] [facility] HX CHOLECYSTECTOMY  2012    HX HEENT      wisdom teeth extraction    HX TONSILLECTOMY      HX UROLOGICAL      TURBT for bladder cancer; recurrent cystoscopies     Social History     Socioeconomic History    Marital status:      Spouse name: Not on file    Number of children: Not on file    Years of education: Not on file    Highest education level: Not on file   Occupational History    Not on file   Social Needs    Financial resource strain: Not on file    Food insecurity:     Worry: Not on file     Inability: Not on file    Transportation needs:     Medical: Not on file     Non-medical: Not on file   Tobacco Use    Smoking status: Former Smoker     Packs/day: 1.00     Years: 20.00     Pack years: 20. 00     Types: Cigarettes     Last attempt to quit: 1980     Years since quittin.8    Smokeless tobacco: Never Used   Substance and Sexual Activity    Alcohol use: Yes     Alcohol/week: 1.0 standard drinks     Types: 1 Glasses of wine per week     Comment: daily glass of wine    Drug use: No    Sexual activity: Never   Lifestyle    Physical activity:     Days per week: Not on file     Minutes per session: Not on file    Stress: Not on file   Relationships    Social connections:     Talks on phone: Not on file     Gets together: Not on file     Attends Zoroastrianism service: Not on file     Active member of club or organization: Not on file     Attends meetings of clubs or organizations: Not on file     Relationship status: Not on file    Intimate partner violence:     Fear of current or ex partner: Not on file     Emotionally abused: Not on file     Physically abused: Not on file     Forced sexual activity: Not on file   Other Topics Concern    Not on file   Social History Narrative    Not on file     Family History   Problem Relation Age of Onset    Heart Attack Mother     Hypertension Mother     High Cholesterol Mother     Hypertension Father     Hypertension Brother        PHYSICAL EXAMINATION:    Visit Vitals  /62   Pulse 69   Ht 5' 4\" (1.626 m)   Wt 56.2 kg (124 lb)   SpO2 95%   BMI 21.28 kg/m²     General:  Well defined, nourished, and groomed individual in no acute distress. Neck: Supple, nontender, no bruits, no pain with resistance to active range of motion. Heart: Regular rate and rhythm, no murmurs, rub, or gallop. Normal S1S2. Lungs:  Clear to auscultation bilaterally with equal chest expansion, no cough, no wheeze  Musculoskeletal:  Extremities revealed no edema and had full range of motion of joints.     Psych:  Good mood and bright affect    NEUROLOGICAL EXAMINATION:     Mental Status:   Alert and oriented to person, place, and time with recent and remote memory intact. Attention span and concentration are normal. Speech is fluent with a full fund of knowledge. Cranial Nerves:    II, III, IV, VI:  Visual acuity grossly intact. Visual fields are normal.    Pupils are equal, round, and reactive to light and accommodation. Extra-ocular movements are full and fluid. Fundoscopic exam was benign, no ptosis or nystagmus. V-XII: Hearing is grossly intact. Facial features are symmetric, with normal sensation and strength. The palate rises symmetrically and the tongue protrudes midline. Sternocleidomastoids 5/5. Motor Examination: Normal tone, bulk, and strength, 5/5 muscle strength throughout. No cogwheel rigidity     Coordination: Finger to nose and rapid arm movement testing with mild bradykinesia and left sided dysmetria. No significant resting tremor. Bilateral minimal intention tremor is trace at worst    Gait and Station:  Steady gait with a decreased bilateral arm swing. No en bloc turning. No pronator drift. No muscle wasting or fasiculations noted. Reflexes:  DTRs 2+ throughout. ASSESSMENT AND PLAN    ICD-10-CM ICD-9-CM    1. [facility]'s [facility] ([facility] G20 332.0 carbidopa-levodopa (SINEMET)  mg per tablet      rasagiline (AZILECT) 1 mg tablet   2. Idiopathic peripheral neuropathy G60.9 356.9 OTHER      Parkinson's disease is actually pretty stable on Sinemet and Azilect. Her main complaints today related to constipation, some mild fatigue, and the neuropathic symptoms in her feet. We discussed the constipation and conservative management of increased fiber, water, establishing a bathroom routine, and activity. She can use miralax as needed. The fatigue may be treated with Amantadine but she would like to not add any more medications if it can be helped and the fatigue is manageable.  With regard to the neuropathic symptoms, she was set up for an EMG and provided with a compounded topical product of Ketamine 10%, Cyclobenzaprine 2%, Baclofen 2%, Flurbiprofen 10%, Gabapentin 6%, Lidocaine 5%, sig apply a pea sized amount to the feet bilaterally tid prn.      Follow up in three months    Portia Medina NP

## 2019-11-05 ENCOUNTER — OFFICE VISIT (OUTPATIENT)
Dept: NEUROLOGY | Age: 79
End: 2019-11-05

## 2019-11-05 DIAGNOSIS — G62.9 PERIPHERAL NERVE DISORDER: Primary | ICD-10-CM

## 2019-11-05 NOTE — PROGRESS NOTES
EMG/ NCS Report  DRUG REHABILITATION  - DAY ONE RESIDENCE  [facility]  [facility], [address] [place] [address] [address]   Ph: [phone]   FAX: [phone]/ [phone]  Test Date:  2019    Patient: Jennifer Donahue : 1940 Physician: Damaso Multani MD   Sex: Female Height: ' \" Ref Mellisa Stephens   ID#: [id number] Weight:  lbs. Technician: Aura Carpenter     Patient History / Exam:  CC:PERIPHERIAL NEUROPATHY          EMG & NCV Findings:  Evaluation of the left Fibular motor and the right Fibular motor nerves showed normal distal onset latency (L4.0, R4.2 ms), normal amplitude (L3.0, R4.2 mV), normal conduction velocity (B Fib-Ankle, L48, R47 m/s), and normal conduction velocity (Poplt-B Fib, L48, R56 m/s). The left tibial motor and the right tibial motor nerves showed normal distal onset latency (L4.1, R4.0 ms), normal amplitude (L6.6, R5.0 mV), and normal conduction velocity (Knee-Ankle, L46, R48 m/s). The left Sup Fibular sensory and the right Sup Fibular sensory nerves showed normal distal peak latency (L2.7, R2.4 ms), normal amplitude (L7.7, R19.5 µV), and normal conduction velocity (Lower leg-Lat ankle, L43, R50 m/s). The left sural sensory and the right sural sensory nerves showed normal distal peak latency (L3.9, R3.6 ms) and reduced amplitude (L2.5, R1.0 µV). All F Wave latencies were within normal limits. All F Wave left vs. right side latency differences were within normal limits. All H Reflex left vs. right side latency differences were within normal limits. All examined muscles (as indicated in the following table) showed no evidence of electrical instability.         Impression:        ___________________________  Jeffrey Roman IV, MD      Nerve Conduction Studies  Anti Sensory Summary Table     Stim Site NR Peak (ms) Norm Peak (ms) P-T Amp (µV) Norm P-T Amp Site1 Site2 Dist (cm)   Left Sup Fibular Anti Sensory (Lat ankle)  32.6°C   Lower leg    2.7 <4.6 7.7 >4 Lower leg Lat ankle 10.0   Site 2    2.4  14.8       Site 3    2.7  4.7       Site 4    2.6  9.9       Right Sup Fibular Anti Sensory (Lat ankle)  30.5°C   Lower leg    2.4 <4.6 19.5 >4 Lower leg Lat ankle 10.0   Site 2    2.1  8.6       Site 3    2.4  14.7       Left Sural Anti Sensory (Lat Mall)  32.7°C   Calf    3.9 <4.5 2.5 >4.0 Calf Lat Mall 14.0   Site 2    4.0  1.1           3.2  6.3       Right Sural Anti Sensory (Lat Mall)  30.6°C   Calf    3.6 <4.5 1.0 >4.0 Calf Lat Mall 14.0   Site 2    3.5  3.4       Site 3    3.4  5.9       Site 4    3.6  3.5       Site 5    4.0  4.0         Motor Summary Table     Stim Site NR Onset (ms) Norm Onset (ms) O-P Amp (mV) Norm O-P Amp Amp (Prev) (%) Site1 Site2 Dist (cm) Serg (m/s) Norm Serg (m/s)   Left Fibular Motor (Ext Dig Brev)  32.3°C   Ankle    4.0 <6.5 3.0 >1.1 100.0 Ankle Ext Dig Brev 8.0     B Fib    10.0  2.3  76.7 B Fib Ankle 29.0 48 >38   Poplt    12.1  2.2  95.7 Poplt B Fib 10.0 48 >42   Right Fibular Motor (Ext Dig Brev)  30.4°C   Ankle    4.2 <6.5 4.2 >1.1 100.0 Ankle Ext Dig Brev 8.0     B Fib    10.2  3.4  81.0 B Fib Ankle 28.0 47 >38   Poplt    12.0  3.4  100.0 Poplt B Fib 10.0 56 >42   Left Tibial Motor (Abd Betancourt Brev)  32.2°C   Ankle    4.1 <6.1 6.6 >1.1 100.0 Ankle Abd Betancourt Brev 8.0     Knee    11.5  3.6  54.5 Knee Ankle 34.0 46 >39   Right Tibial Motor (Abd Betancourt Brev)  30.3°C   Ankle    4.0 <6.1 5.0 >1.1 100.0 Ankle Abd Betancourt Brev 8.0     Knee    11.3  3.0  60.0 Knee Ankle 35.0 48 >39     F Wave Studies     NR F-Lat (ms) Lat Norm (ms) L-R F-Lat (ms) L-R Lat Norm   Left Tibial (Mrkrs) (Abd Hallucis)  32.1°C      49.26 <56 2.46 <5.7   Right Tibial (Mrkrs) (Abd Hallucis)  30°C      51.72 <56 2.46 <5.7     H Reflex Studies     NR H-Lat (ms) L-R H-Lat (ms) L-R Lat Norm   Left Tibial (Gastroc)  32°C      37.57 0.00 <2.0   Right Tibial (Gastroc)  29.9°C      37.57 0.00 <2.0     EMG     Side Muscle Nerve Root Ins Act Fibs Psw Recrt Duration Amp Poly Comment   Left Ext Dig Brev Dp Br Peron L5, S1 Nml Nml Nml Nml Nml Nml Nml    Left AntTibialis Dp Br Peron L4-5 Nml Nml Nml Nml Nml Nml Nml    Left MedGastroc Tibial S1-2 Nml Nml Nml Nml Nml Nml Nml    Left VastusMed Femoral L2-4 Nml Nml Nml Nml Nml Nml Nml    Left BicepsFemL Sciatic L5-S2 Nml Nml Nml Nml Nml Nml Nml    Right Ext Dig Brev Dp Br Peron L5, S1 Nml Nml Nml Nml Nml Nml Nml    Right AntTibialis Dp Br Peron L4-5 Nml Nml Nml Nml Nml Nml Nml    Right MedGastroc Tibial S1-2 Nml Nml Nml Nml Nml Nml Nml    Right VastusMed Femoral L2-4 Nml Nml Nml Nml Nml Nml Nml    Right BicepsFemL Sciatic L5-S2 Nml Nml Nml Nml Nml Nml Nml                Nerve Conduction Studies  Anti Sensory Left/Right Comparison     Stim Site L Lat (ms) R Lat (ms) L-R Lat (ms) L Amp (µV) R Amp (µV) L-R Amp (%) Site1 Site2 L Serg (m/s) R Serg (m/s) L-R Serg (m/s)   Sup Fibular Anti Sensory (Lat ankle)  32.6°C   Lower leg 2.3 2.0 0.3 7.7 19.5 60.5 Lower leg Lat ankle 43 50 7   Site 2 2.0   14.8          Site 3 2.0   4.7          Site 4 2.0   9.9          Sural Anti Sensory (Lat Mall)  32.7°C   Calf 3.4 3.2 0.2 2.5 1.0 60.0 Calf Lat Mall 41 44 3   Site 2 3.5 3.0 0.5 1.1 3.4 67.6         2.8   6.3            Motor Left/Right Comparison     Stim Site L Lat (ms) R Lat (ms) L-R Lat (ms) L Amp (mV) R Amp (mV) L-R Amp (%) Site1 Site2 L Serg (m/s) R Serg (m/s) L-R Serg (m/s)   Fibular Motor (Ext Dig Brev)  32.3°C   Ankle 4.0 4.2 0.2 3.0 4.2 28.6 Ankle Ext Dig Brev      B Fib 10.0 10.2 0.2 2.3 3.4 32.4 B Fib Ankle 48 47 1   Poplt 12.1 12.0 0.1 2.2 3.4 35.3 Poplt B Fib 48 56 8   Tibial Motor (Abd Betancourt Brev)  32.2°C   Ankle 4.1 4.0 0.1 6.6 5.0 24.2 Ankle Abd Betancourt Brev      Knee 11.5 11.3 0.2 3.6 3.0 16.7 Knee Ankle 46 48 2         Waveforms:

## 2019-11-05 NOTE — PROGRESS NOTES
This was an elective EMG nerve conduction of patient's lower extremities. Concerns went to an expressed peripheral neuropathy. Patient history. Patient basically has a had burning in the feet for years. No known history of diabetes and says she is been checked repeatedly for the same? Patient exam.  Alert cooperative articulate appropriate. Cranial nerves II through XII normal.  Cerebellar testing venous finger toe to finger intact. Motor 5/5. Sensory intact reflexes approaching +2 gait and transfers show diminished bilateral arm swing as part of Parkinson's picture. EMG nerve conduction findings. 1.  Needle probing following insertion was normal.  Patient tolerated this without exception. No evidence of acute denervation or chronic denervation/reinnervation or myopathic potentials. Motor unit recruitment as to number morphology and time sequencing was normal.    2.  The nerve conduction portion was normal.  This included coincident tibial H reflexes and F waves. Impression: This is a normal EMG nerve conduction as stated. The study does not rule out small fiber sensory neuropathy. Clinical correlation is advised.   KATIA NICOLAS.

## 2020-01-17 ENCOUNTER — HOSPITAL ENCOUNTER (OUTPATIENT)
Dept: LAB | Age: 80
Discharge: HOME | End: 2020-01-17

## 2020-01-17 ENCOUNTER — OFFICE VISIT (OUTPATIENT)
Dept: INTERNAL MEDICINE CLINIC | Age: 80
End: 2020-01-17

## 2020-01-17 ENCOUNTER — HOSPITAL ENCOUNTER (OUTPATIENT)
Dept: GENERAL RADIOLOGY | Age: 80
Discharge: HOME | End: 2020-01-17
Attending: INTERNAL MEDICINE
Payer: MEDICARE

## 2020-01-17 VITALS
RESPIRATION RATE: 16 BRPM | DIASTOLIC BLOOD PRESSURE: 64 MMHG | HEART RATE: 77 BPM | WEIGHT: 124 LBS | SYSTOLIC BLOOD PRESSURE: 115 MMHG | BODY MASS INDEX: 21.17 KG/M2 | TEMPERATURE: 97.4 F | HEIGHT: 64 IN | OXYGEN SATURATION: 96 %

## 2020-01-17 DIAGNOSIS — I10 ESSENTIAL HYPERTENSION: ICD-10-CM

## 2020-01-17 DIAGNOSIS — M54.2 NECK PAIN: ICD-10-CM

## 2020-01-17 DIAGNOSIS — R35.0 URINARY FREQUENCY: ICD-10-CM

## 2020-01-17 DIAGNOSIS — E78.5 DYSLIPIDEMIA: ICD-10-CM

## 2020-01-17 DIAGNOSIS — Z00.00 MEDICARE ANNUAL WELLNESS VISIT, SUBSEQUENT: Primary | ICD-10-CM

## 2020-01-17 DIAGNOSIS — J44.1 COPD EXACERBATION (HCC): ICD-10-CM

## 2020-01-17 DIAGNOSIS — I25.10 CORONARY ARTERY DISEASE INVOLVING NATIVE HEART WITHOUT ANGINA PECTORIS, UNSPECIFIED VESSEL OR LESION TYPE: ICD-10-CM

## 2020-01-17 DIAGNOSIS — G20 PARKINSON DISEASE (HCC): ICD-10-CM

## 2020-01-17 DIAGNOSIS — Z78.0 POST-MENOPAUSAL: ICD-10-CM

## 2020-01-17 LAB
ALBUMIN SERPL-MCNC: 4.1 G/DL (ref 3.5–5)
ALBUMIN/GLOB SERPL: 1.5 {RATIO} (ref 1.1–2.2)
ALP SERPL-CCNC: 100 U/L (ref 45–117)
ALT SERPL-CCNC: 9 U/L (ref 12–78)
ANION GAP SERPL CALC-SCNC: 2 MMOL/L (ref 5–15)
AST SERPL-CCNC: 16 U/L (ref 15–37)
BILIRUB SERPL-MCNC: 1.3 MG/DL (ref 0.2–1)
BUN SERPL-MCNC: 17 MG/DL (ref 6–20)
BUN/CREAT SERPL: 25 (ref 12–20)
CALCIUM SERPL-MCNC: 9.3 MG/DL (ref 8.5–10.1)
CHLORIDE SERPL-SCNC: 104 MMOL/L (ref 97–108)
CHOLEST SERPL-MCNC: 132 MG/DL
CO2 SERPL-SCNC: 31 MMOL/L (ref 21–32)
CREAT SERPL-MCNC: 0.68 MG/DL (ref 0.55–1.02)
ERYTHROCYTE [DISTWIDTH] IN BLOOD BY AUTOMATED COUNT: 13.4 % (ref 11.5–14.5)
GLOBULIN SER CALC-MCNC: 2.8 G/DL (ref 2–4)
GLUCOSE SERPL-MCNC: 96 MG/DL (ref 65–100)
HCT VFR BLD AUTO: 48.1 % (ref 35–47)
HDLC SERPL-MCNC: 82 MG/DL
HDLC SERPL: 1.6 {RATIO} (ref 0–5)
HGB BLD-MCNC: 14.7 G/DL (ref 11.5–16)
LDLC SERPL CALC-MCNC: 40.8 MG/DL (ref 0–100)
LIPID PROFILE,FLP: NORMAL
MCH RBC QN AUTO: 30.6 PG (ref 26–34)
MCHC RBC AUTO-ENTMCNC: 30.6 G/DL (ref 30–36.5)
MCV RBC AUTO: 100 FL (ref 80–99)
NRBC # BLD: 0 K/UL (ref 0–0.01)
NRBC BLD-RTO: 0 PER 100 WBC
PLATELET # BLD AUTO: 246 K/UL (ref 150–400)
PMV BLD AUTO: 11.1 FL (ref 8.9–12.9)
POTASSIUM SERPL-SCNC: 5.3 MMOL/L (ref 3.5–5.1)
PROT SERPL-MCNC: 6.9 G/DL (ref 6.4–8.2)
RBC # BLD AUTO: 4.81 M/UL (ref 3.8–5.2)
SODIUM SERPL-SCNC: 137 MMOL/L (ref 136–145)
TRIGL SERPL-MCNC: 46 MG/DL (ref ?–150)
VLDLC SERPL CALC-MCNC: 9.2 MG/DL
WBC # BLD AUTO: 8.9 K/UL (ref 3.6–11)

## 2020-01-17 PROCEDURE — 72050 X-RAY EXAM NECK SPINE 4/5VWS: CPT

## 2020-01-17 RX ORDER — ACETAMINOPHEN AND CODEINE PHOSPHATE 300; 30 MG/1; MG/1
1 TABLET ORAL
Qty: 60 TAB | Refills: 3 | Status: CANCELLED | OUTPATIENT
Start: 2020-01-17 | End: 2020-02-16

## 2020-01-17 RX ORDER — ACETAMINOPHEN AND CODEINE PHOSPHATE 300; 30 MG/1; MG/1
1 TABLET ORAL
Qty: 60 TAB | Refills: 3 | Status: SHIPPED | OUTPATIENT
Start: 2020-01-17 | End: 2020-01-30

## 2020-01-17 NOTE — PROGRESS NOTES
This is the Subsequent Medicare Annual Wellness Exam, performed 12 months or more after the Initial AWV or the last Subsequent AWV    I have reviewed the patient's medical history in detail and updated the computerized patient record.      History     Patient Active Problem List   Diagnosis Code    HTN (hypertension) I10    Serum lipids high E78.5    GERD (gastroesophageal reflux disease) K21.9    Menopausal state N95.1    Asthma J45.909    ACP (advance care planning) Z71.89    Chronic obstructive pulmonary disease ([facility] [address] J44.9    COPD exacerbation (HCC) J44.1    Acute respiratory failure with hypoxia ([facility]) J96.01    Sinus tachycardia R00.0    CAD (coronary artery disease) I25.10    S/P CABG (coronary artery bypass graft) Z95.1    History of bladder cancer Z85.51     Past Medical History:   Diagnosis Date    Asthma 10/11/2012    CAD (coronary artery disease) 4/3/2018    Cancer ([facility] [address]     bladder cancer    Chronic obstructive pulmonary disease (HCC)     GERD (gastroesophageal reflux disease) 4/1/2009    HTN 4/1/2009    Menopausal state 4/1/2009    Serum lipid, elevated 4/1/2009      Past Surgical History:   Procedure Laterality Date    CABG, ARTERY-VEIN, THREE  03/22/2016    Cabg x 3    COLONOSCOPY N/A 8/31/2016    COLONOSCOPY performed by Devon Ortiz MD at [facility] HX CHOLECYSTECTOMY  2012    HX HEENT      wisdom teeth extraction    HX TONSILLECTOMY      HX UROLOGICAL      TURBT for bladder cancer; recurrent cystoscopies     Current Outpatient Medications   Medication Sig Dispense Refill    carbidopa-levodopa (SINEMET)  mg per tablet 1 po at 7am, 11 am, 3pm and 7pm 120 Tab 3    rasagiline (AZILECT) 1 mg tablet Take one half tablet daily for one week then increase to one tablet daily thereafter 90 Tab 2    OTHER Sig: Ketamine 10%, Cyclobenzaprine 2%, Baclofen 2%, Flurbiprofen 10%, Gabapentin 6%, Lidocaine 5%, sig apply a pea sized amount to the feet bilaterally tid prn 120 g 2    lisinopril (PRINIVIL, ZESTRIL) 10 mg tablet TAKE 1 TABLET BY MOUTH ONCE DAILY 90 Tab 1    ascorbic acid, vitamin C, (VITAMIN C) 500 mg tablet Take  by mouth.  azithromycin (ZITHROMAX) 250 mg tablet Take 250 mg by mouth three (3) days a week.  turmeric 400 mg cap Take  by mouth.  albuterol (PROVENTIL VENTOLIN) 2.5 mg /3 mL (0.083 %) nebulizer solution by Nebulization route once.  rosuvastatin (CRESTOR) 20 mg tablet Take 20 mg by mouth nightly.  KRILL OIL PO Take 1 Cap by mouth daily.  budesonide-formoterol (SYMBICORT) 160-4.5 mcg/actuation HFA inhaler Take 2 Puffs by inhalation two (2) times a day. 3 Inhaler 1    magnesium 250 mg tab Take 500 mg by mouth daily.  aclidinium bromide (TUDORZA PRESSAIR) 400 mcg/actuation inhaler Take 1 Puff by inhalation daily.  cholecalciferol (VITAMIN D3) 1,000 unit tablet Take 1,000 Units by mouth daily.  albuterol (PROVENTIL HFA, VENTOLIN HFA) 90 mcg/actuation inhaler Take 2 Puffs by inhalation every four (4) hours as needed for Wheezing. 1 Inhaler 2    ASPIRIN 81 mg chewable tablet Take 81 mg by mouth every other day.  multivitamin (ONE A DAY) tablet Take 1 Tab by mouth daily.  lisinopril-hydroCHLOROthiazide (PRINZIDE, ZESTORETIC) 10-12.5 mg per tablet Take 1 Tab by mouth daily for 90 days. 90 Tab 0    CALCIUM CARBONATE/VITAMIN D3 (CALCIUM + D PO) Take 1 Cap by mouth daily.        Allergies   Allergen Reactions    Augmentin [Amoxicillin-Pot Clavulanate] Nausea Only       Family History   Problem Relation Age of Onset    Heart Attack Mother     Hypertension Mother     High Cholesterol Mother     Hypertension Father     Hypertension Brother      Social History     Tobacco Use    Smoking status: Former Smoker     Packs/day: 1.00     Years: 20.00     Pack years: 20.00     Types: Cigarettes     Last attempt to quit: 1980     Years since quittin.0    Smokeless tobacco: Never Used   Substance Use Topics    Alcohol use: Yes     Alcohol/week: 1.0 standard drinks     Types: 1 Glasses of wine per week     Comment: daily glass of wine       Depression Risk Factor Screening:     3 most recent PHQ Screens 1/17/2020   Little interest or pleasure in doing things Not at all   Feeling down, depressed, irritable, or hopeless Not at all   Total Score PHQ 2 0       Alcohol Risk Factor Screening:   Do you average 1 drink per night or more than 7 drinks a week:  No    On any one occasion in the past three months have you have had more than 3 drinks containing alcohol:  No      Functional Ability and Level of Safety:   Hearing: Hearing is good. Activities of Daily Living: The home contains: no safety equipment. Patient does total self care    Ambulation: with no difficulty    Fall Risk:  Fall Risk Assessment, last 12 mths 3/8/2019   Able to walk? Yes   Fall in past 12 months? Yes   Fall with injury? No   Number of falls in past 12 months 2   Fall Risk Score 2       Abuse Screen:  Patient is not abused    Cognitive Screening   Has your family/caregiver stated any concerns about your memory: no  Cognitive Screening: Normal - MMSE (Mini Mental Status Exam)    Patient Care Team   Patient Care Team:  Francisco Jenkins MD as PCP - Kelly Muhammad MD as PCP - [facility] Empaneled Provider  Meg Paez MD (Pulmonary Disease)  Sonia Roger MD as Consulting Provider (Pulmonary Disease)    Assessment/Plan   Education and counseling provided:  Are appropriate based on today's review and evaluation    Diagnoses and all orders for this visit:    1. Neck pain  -     XR SPINE CERV 4 OR 5 V; Future  -     REFERRAL TO PAIN MANAGEMENT    2. COPD exacerbation ([address]    3. Coronary artery disease involving native heart without angina pectoris, unspecified vessel or lesion type    4. Essential hypertension    5. Dyslipidemia    6. Parkinson disease ([address]    7.  Post-menopausal  -     DEXA BONE DENSITY STUDY AXIAL; Future        Health Maintenance Due   Topic Date Due    Shingrix Vaccine Age 49> (1 of 2) 12/16/1992    GLAUCOMA SCREENING Q2Y  04/21/2017    MEDICARE YEARLY EXAM  01/30/2020     This note will not be viewable in [facility].

## 2020-01-17 NOTE — PATIENT INSTRUCTIONS
Medicare Wellness Visit, Female The best way to live healthy is to have a lifestyle where you eat a well-balanced diet, exercise regularly, limit alcohol use, and quit all forms of tobacco/nicotine, if applicable. Regular preventive services are another way to keep healthy. Preventive services (vaccines, screening tests, monitoring & exams) can help personalize your care plan, which helps you manage your own care. Screening tests can find health problems at the earliest stages, when they are easiest to treat. [facility] follows the current, evidence-based guidelines published by the [facility] ([facility] [[facility]]STF) when recommending preventive services for our patients. Because we follow these guidelines, sometimes recommendations change over time as research supports it. (For example, mammograms used to be recommended annually. Even though Medicare will still pay for an annual mammogram, the newer guidelines recommend a mammogram every two years for women of average risk). Of course, you and your doctor may decide to screen more often for some diseases, based on your risk and your co-morbidities (chronic disease you are already diagnosed with). Preventive services for you include: - Medicare offers their members a free annual wellness visit, which is time for you and your primary care provider to discuss and plan for your preventive service needs. Take advantage of this benefit every year! 
-All adults over the age of 72 should receive the recommended pneumonia vaccines. Current USPSTF guidelines recommend a series of two vaccines for the best pneumonia protection.  
-All adults should have a flu vaccine yearly and a tetanus vaccine every 10 years.  
-All adults age 48 and older should receive the shingles vaccines (series of two vaccines). -All adults age 38-68 who are overweight should have a diabetes screening test once every three years. -All adults born between [birth year] and 1963 should be screened once for Hepatitis C. 
-Other screening tests and preventive services for persons with diabetes include: an eye exam to screen for diabetic retinopathy, a kidney function test, a foot exam, and stricter control over your cholesterol.  
-Cardiovascular screening for adults with routine risk involves an electrocardiogram (ECG) at intervals determined by your doctor.  
-Colorectal cancer screenings should be done for adults age 54-65 with no increased risk factors for colorectal cancer. There are a number of acceptable methods of screening for this type of cancer. Each test has its own benefits and drawbacks. Discuss with your doctor what is most appropriate for you during your annual wellness visit. The different tests include: colonoscopy (considered the best screening method), a fecal occult blood test, a fecal DNA test, and sigmoidoscopy. 
 
-A bone mass density test is recommended when a woman turns 65 to screen for osteoporosis. This test is only recommended one time, as a screening. Some providers will use this same test as a disease monitoring tool if you already have osteoporosis. -Breast cancer screenings are recommended every other year for women of normal risk, age 54-69. 
-Cervical cancer screenings for women over age 72 are only recommended with certain risk factors. Here is a list of your current Health Maintenance items (your personalized list of preventive services) with a due date: 
Health Maintenance Due Topic Date Due  Shingles Vaccine (1 of 2) 12/16/1992  Glaucoma Screening   04/21/2017 Jeremiah Nora Annual Well Visit  01/30/2020

## 2020-01-17 NOTE — PROGRESS NOTES
HISTORY OF PRESENT ILLNESS  Dulce Maria Zhao is a 70 y.o. female. HPI   Neck pain: Pt reports that she has been experiencing constant neck pain over the last 6 months. She thinks it is due to her Parkinson's. She puts a pillow under her neck and uses Tylenol with some relief. She was given some Tylenol-3 in 2012 that helped her pains tin the past. Denies pain radiating down her arms. Hypertension ROS: taking medications as instructed, no medication side effects noted, no TIA's, no chest pain on exertion, no dyspnea on exertion, no swelling of ankles. New concerns: BP in office today is 115/64. Continues on Lisinopril 10 mg. Hyperlipidemia:  Cardiovascular risk analysis - 70 y.o. female LDL goal is under 130. ROS: taking medications as instructed, no medication side effects noted, no TIA's, no chest pain on exertion, no dyspnea on exertion, no swelling of ankles. Tolerating meds, no myalgias or other side effects noted  New concerns: Pt's last LDL was 55 on 1/30/19. Continues on Crestor 20 mg.    Parkinson's: Pt endorses stable exercise walking 3x weekly and strength training 2x weekly at the gym. Continues on Carbidopa-Levadopa  mg QID (per NP Norman). She notes occasional constipation with relief when using Miralax. Denies recent falls. COPD: Stable, pt continues to comply with inhalers. She cannot carry heavy things and walk without a flare in her COPD. Urinary hesitancy: Pt notes occasional urination hesitancy. She thinks it is a side effect of age. CAD: Stable and well-managed. Review of Systems   Genitourinary:        Urinary hesitancy   Musculoskeletal: Positive for neck pain. All other systems reviewed and are negative. Physical Exam  Constitutional:       Appearance: Normal appearance.    HENT:      Right Ear: Hearing, tympanic membrane and external ear normal.      Left Ear: Hearing, tympanic membrane and external ear normal.      Mouth/Throat:      Mouth: Mucous membranes are moist.      Pharynx: Oropharynx is clear. Cardiovascular:      Rate and Rhythm: Normal rate and regular rhythm. Pulmonary:      Effort: Pulmonary effort is normal.      Breath sounds: Normal breath sounds and air entry. Musculoskeletal: Normal range of motion. Skin:     General: Skin is warm and dry. Neurological:      General: No focal deficit present. Mental Status: She is alert and oriented to person, place, and time. Psychiatric:         Mood and Affect: Mood normal.         Behavior: Behavior normal.         ASSESSMENT and PLAN  Diagnoses and all orders for this visit:    1. Medicare annual wellness visit, subsequent    2. COPD exacerbation ([address]  Stable and well-managed with inhalers. No change in medications. -     CBC W/O DIFF; Future    3. Coronary artery disease involving native heart without angina pectoris, unspecified vessel or lesion type  Stable and well-managed. Will continue to monitor for improvements or changes. 4. Essential hypertension  BP is at goal. I do not recommend any change in medications.   -     METABOLIC PANEL, COMPREHENSIVE; Future    5. Dyslipidemia  Presumed stable with Crestor 20 mg, patient is tolerating medications, no myalgias. I do not recommend any change in medications. Rechecking labs today. Waiting on results. -     LIPID PANEL; Future    6. Neck pain  Prescribed Tylenol 3 for pt to use very PRN. Ordered XR of spine. Recommended PT to work on exercises or stretches to help alleviate the pain. Discussed that if she goes for only 1-2 sessions and get a list of stretches, she can do them at home and implement them on her own. Informed pt that it could be useful to get a bone density and evaluate the curvature and of the bone. Recommended pt to think about meeting with a pain management specialist to discuss injections. Pt defers PT- she thinks it is a waste of her time.   -     XR SPINE CERV 4 OR 5 V; Future  -     REFERRAL TO PAIN MANAGEMENT  -     acetaminophen-codeine (TYLENOL #3) 300-30 mg per tablet; Take 1 Tab by mouth every four (4) hours as needed for Pain for up to 30 days. Max Daily Amount: 6 Tabs. 7. Parkinson disease ([address]  Stable and well-managed with medications per neurologist (AKILA Miguel). No change in medications. Continues to f/u with AKILA Austin regularly. 8. Post-menopausal  Ordered routine medical DEXA scans. Also hoping this will provide important clinical insight of the neck- due to increased pains. Waiting on results. -     DEXA BONE DENSITY STUDY AXIAL; Future    9. Urinary frequency  Not stable but she would like to leave it untreated due to mild nature of urinary hesitancy. Will continue to monitor for improvements or changes. Additional comments: Discussed the Shingrix vaccine with pt. Informed pt that this new vaccine is replacing the old one with a 90% protective factor. Explained to pt that the 2-part vaccine can cause a flu-like illness for 24-48 hours. Pt deferred- she would like to 'take her chances'. Lab results and schedule of future lab studies reviewed with patient. Reviewed diet, exercise and weight control. Written by Korin Oliveira, as dictated by Kahlil Ramires MD.     Current diagnosis and concerns discussed with pt at length. Understands risks and benefits or current treatment plan and medications and accepts the treatment and medication with any possible risks. Pt asks appropriate questions which were answered. Pt instructed to call with any concerns or problems. This note will not be viewable in [address]

## 2020-01-30 ENCOUNTER — OFFICE VISIT (OUTPATIENT)
Dept: NEUROLOGY | Age: 80
End: 2020-01-30

## 2020-01-30 VITALS
WEIGHT: 123 LBS | RESPIRATION RATE: 18 BRPM | BODY MASS INDEX: 21 KG/M2 | SYSTOLIC BLOOD PRESSURE: 128 MMHG | DIASTOLIC BLOOD PRESSURE: 72 MMHG | OXYGEN SATURATION: 98 % | HEIGHT: 64 IN | HEART RATE: 67 BPM

## 2020-01-30 DIAGNOSIS — G20 PARKINSON'S DISEASE (HCC): Primary | ICD-10-CM

## 2020-01-30 DIAGNOSIS — G60.9 IDIOPATHIC PERIPHERAL NEUROPATHY: ICD-10-CM

## 2020-01-30 RX ORDER — CARBIDOPA AND LEVODOPA 25; 100 MG/1; MG/1
TABLET ORAL
Qty: 360 TAB | Refills: 3 | Status: SHIPPED | OUTPATIENT
Start: 2020-01-30 | End: 2020-09-29 | Stop reason: SDUPTHER

## 2020-01-30 NOTE — PROGRESS NOTES
[facility] Neurology Clinics and  [address] at [facility] Neurology Clinics at [address] [facility], [id number] [place] [address] [address] [facility], [address] [place] [address]  [phone] Office  [phone] Facsimile           Date:  20     Name:  Elicia Motta  :  1940  MRN:  [id number]     PCP:  Mya Mauricio MD    Chief Complaint   Patient presents with    Parkinsons Disease     HISTORY OF PRESENT ILLNESS: Mrs. Claudia Tejada is a 61-year-old, right-handed,  woman who presents today for follow-up of Parkinson's disease. At her last office visit, she was having some increased leg heaviness and was to add Azilect. She did not get this though because it was too expensive. She continues taking Sinemet 25/100 4 times daily at 4-hour intervals. She indicates that she has been doing quite well. Does not feel that there have been any changes since her last office visit. Her balance and tremors which were her primary symptoms are much better since starting the Sinemet and the tremor is barely noticeable and certainly not bothersome. Today she mentions that her legs will feel stiff and heavy particularly if she has been sitting for a while. She denies falls, slowness, freezing or wearing off and off time. No issues with voice quality or swallowing. She has not had any sudden, erratic movements. Denies issues with memory loss, depression/anxiety, difficulty sleeping, issues with talking in sleep or acting out dreams, hallucinations/delusions. No constipation or nausea. no lightheadedness or positional dizziness. No compulsive behaviors/urges. She is independent with ADLs. Recap from LOV:  Again discussed diagnosis of Parkinson's disease.  She had specific questions regarding progression and what she might have to look forward too given that she knows of two friends who had the disease and had issues with dementia, hallucination, and delusions. Tried to provide some reassurance that this is not necessarily going to be her course. While these issues are possible, they do not occur for all patients. She is significantly better since starting the Sinemet. However, she did mention today that her legs have felt stiff and heavy. Will add Azilect for additional management. Purpose and potential side effects were discussed and she has verbalized understanding. Current Outpatient Medications   Medication Sig    carbidopa-levodopa (SINEMET)  mg per tablet 1 po at 7am, 11 am, 3pm and 7pm    rasagiline (AZILECT) 1 mg tablet Take one half tablet daily for one week then increase to one tablet daily thereafter    lisinopril (PRINIVIL, ZESTRIL) 10 mg tablet TAKE 1 TABLET BY MOUTH ONCE DAILY    ascorbic acid, vitamin C, (VITAMIN C) 500 mg tablet Take  by mouth.  azithromycin (ZITHROMAX) 250 mg tablet Take 250 mg by mouth three (3) days a week.  turmeric 400 mg cap Take  by mouth.  albuterol (PROVENTIL VENTOLIN) 2.5 mg /3 mL (0.083 %) nebulizer solution by Nebulization route once.  rosuvastatin (CRESTOR) 20 mg tablet Take 20 mg by mouth nightly.  KRILL OIL PO Take 1 Cap by mouth daily.  budesonide-formoterol (SYMBICORT) 160-4.5 mcg/actuation HFA inhaler Take 2 Puffs by inhalation two (2) times a day.  magnesium 250 mg tab Take 500 mg by mouth daily.  aclidinium bromide (TUDORZA PRESSAIR) 400 mcg/actuation inhaler Take 1 Puff by inhalation daily.  cholecalciferol (VITAMIN D3) 1,000 unit tablet Take 1,000 Units by mouth daily.  albuterol (PROVENTIL HFA, VENTOLIN HFA) 90 mcg/actuation inhaler Take 2 Puffs by inhalation every four (4) hours as needed for Wheezing.  ASPIRIN 81 mg chewable tablet Take 81 mg by mouth every other day.  lisinopril-hydroCHLOROthiazide (PRINZIDE, ZESTORETIC) 10-12.5 mg per tablet Take 1 Tab by mouth daily for 90 days. No current facility-administered medications for this visit. Allergies   Allergen Reactions    Augmentin [Amoxicillin-Pot Clavulanate] Nausea Only     Past Medical History:   Diagnosis Date    Asthma 10/11/2012    CAD (coronary artery disease) 4/3/2018    Cancer ([address]     bladder cancer    Chronic obstructive pulmonary disease (HCC)     GERD (gastroesophageal reflux disease) 2009    HTN 2009    Menopausal state 2009    Serum lipid, elevated 2009     Past Surgical History:   Procedure Laterality Date    CABG, ARTERY-VEIN, THREE  2016    Cabg x 3    COLONOSCOPY N/A 2016    COLONOSCOPY performed by Hakan Woods MD at [address] HX CHOLECYSTECTOMY      HX HEENT      wisdom teeth extraction    HX TONSILLECTOMY      HX UROLOGICAL      TURBT for bladder cancer; recurrent cystoscopies     Social History     Socioeconomic History    Marital status:      Spouse name: Not on file    Number of children: Not on file    Years of education: Not on file    Highest education level: Not on file   Occupational History    Not on file   Social Needs    Financial resource strain: Not on file    Food insecurity:     Worry: Not on file     Inability: Not on file    Transportation needs:     Medical: Not on file     Non-medical: Not on file   Tobacco Use    Smoking status: Former Smoker     Packs/day: 1.00     Years: 20.00     Pack years: 20.00     Types: Cigarettes     Last attempt to quit: 1980     Years since quittin.1    Smokeless tobacco: Never Used   Substance and Sexual Activity    Alcohol use:  Yes     Alcohol/week: 1.0 standard drinks     Types: 1 Glasses of wine per week     Comment: daily glass of wine    Drug use: No    Sexual activity: Never   Lifestyle    Physical activity:     Days per week: Not on file     Minutes per session: Not on file    Stress: Not on file   Relationships    Social connections:     Talks on phone: Not on file     Gets together: Not on file     Attends Jew service: Not on file     Active member of club or organization: Not on file     Attends meetings of clubs or organizations: Not on file     Relationship status: Not on file    Intimate partner violence:     Fear of current or ex partner: Not on file     Emotionally abused: Not on file     Physically abused: Not on file     Forced sexual activity: Not on file   Other Topics Concern    Not on file   Social History Narrative    Not on file     Family History   Problem Relation Age of Onset    Heart Attack Mother     Hypertension Mother     High Cholesterol Mother     Hypertension Father     Hypertension Brother        PHYSICAL EXAMINATION:    Visit Vitals  /72 (BP 1 Location: Left arm, BP Patient Position: Sitting)   Pulse 67   Resp 18   Ht 5' 4\" (1.626 m)   Wt 55.8 kg (123 lb)   SpO2 98%   BMI 21.11 kg/m²     General:  Well defined, nourished, and groomed individual in no acute distress. Neck: Supple, nontender, no bruits, no pain with resistance to active range of motion. Heart: Regular rate and rhythm, no murmurs, rub, or gallop. Normal S1S2. Lungs:  Clear to auscultation bilaterally with equal chest expansion, no cough, no wheeze  Musculoskeletal:  Extremities revealed no edema and had full range of motion of joints. Psych:  Good mood and bright affect    NEUROLOGICAL EXAMINATION:     Mental Status:   Alert and oriented to person, place, and time with recent and remote memory intact. Attention span and concentration are normal. Speech is fluent with a full fund of knowledge. Cranial Nerves:    II, III, IV, VI:  Visual acuity grossly intact. Visual fields are normal.    Pupils are equal, round, and reactive to light and accommodation. Extra-ocular movements are full and fluid. Fundoscopic exam was benign, no ptosis or nystagmus. V-XII: Hearing is grossly intact. Facial features are symmetric, with normal sensation and strength.   The palate rises symmetrically and the tongue protrudes midline. Sternocleidomastoids 5/5. Motor Examination: Normal tone, bulk, and strength, 5/5 muscle strength throughout. No cogwheel rigidity     Coordination: Finger to nose and rapid arm movement testing with mild bradykinesia and left sided dysmetria. No significant resting tremor. Bilateral minimal intention tremor    Gait and Station:  Steady gait with a decreased bilateral arm swing. She does have some mild stiffness. There was attenuation of the left hand tremor with walking. No en bloc turning. No pronator drift. No muscle wasting or fasiculations noted. Reflexes:  DTRs 2+ throughout. ASSESSMENT AND PLAN    ICD-10-CM ICD-9-CM    1. Parkinson's disease ([facility] [address] G20 332.0 carbidopa-levodopa (SINEMET)  mg per tablet   2. Idiopathic peripheral neuropathy G60.9 356.9      Parkinson's disease and idiopathic peripheral neuropathy are well managed at this point. She is fairly happy with her present therapy of Sinemet and. The heaviness that was in her legs from her previous office visit seems to have resolved with the addition of the [address] [facility] [address]. She did mention today that on occasion she does have some difficulty with becoming dizzy when she stands. We discussed potential treatment options for this. It is only occasionally and she is not interested in adding any additional medications at this time. We did discuss conservative management with potentially using compression stockings increasing fluid intake, and even potentially decreasing the dose of her antihypertensive.   Follow-up in 4 months    Johan Frazier NP

## 2020-01-30 NOTE — PROGRESS NOTES
Patient states since her last visit she has been fine no complaints. But she has been more tired than usual and constipated.

## 2020-01-31 ENCOUNTER — HOSPITAL ENCOUNTER (OUTPATIENT)
Dept: MAMMOGRAPHY | Age: 80
Discharge: HOME | End: 2020-01-31
Attending: INTERNAL MEDICINE
Payer: MEDICARE

## 2020-01-31 DIAGNOSIS — Z78.0 POST-MENOPAUSAL: ICD-10-CM

## 2020-01-31 PROCEDURE — 77080 DXA BONE DENSITY AXIAL: CPT

## 2020-03-02 ENCOUNTER — HOSPITAL ENCOUNTER (OUTPATIENT)
Dept: MRI IMAGING | Age: 80
Discharge: HOME | End: 2020-03-02
Attending: ORTHOPAEDIC SURGERY
Payer: MEDICARE

## 2020-03-02 DIAGNOSIS — M54.16 LUMBAR RADICULOPATHY: ICD-10-CM

## 2020-03-02 PROCEDURE — 72148 MRI LUMBAR SPINE W/O DYE: CPT

## 2020-05-27 ENCOUNTER — OFFICE VISIT (OUTPATIENT)
Dept: NEUROLOGY | Age: 80
End: 2020-05-27

## 2020-05-27 VITALS
WEIGHT: 117.4 LBS | TEMPERATURE: 97.8 F | HEIGHT: 64 IN | HEART RATE: 68 BPM | OXYGEN SATURATION: 98 % | DIASTOLIC BLOOD PRESSURE: 62 MMHG | BODY MASS INDEX: 20.04 KG/M2 | SYSTOLIC BLOOD PRESSURE: 108 MMHG | RESPIRATION RATE: 18 BRPM

## 2020-05-27 DIAGNOSIS — G57.02 COMPRESSION OF COMMON PERONEAL NERVE OF LEFT LOWER EXTREMITY: ICD-10-CM

## 2020-05-27 DIAGNOSIS — R27.8 SENSORY ATAXIA: ICD-10-CM

## 2020-05-27 DIAGNOSIS — G20 PARKINSON'S DISEASE (HCC): Primary | ICD-10-CM

## 2020-05-27 RX ORDER — ISOSORBIDE MONONITRATE 20 MG/1
TABLET ORAL DAILY
COMMUNITY
End: 2020-09-24

## 2020-05-27 NOTE — PROGRESS NOTES
[facility] Neurology Clinics and  [address] at [facility] Neurology Clinics at [address] [facility], [address] [place] [address] [address] [facility], [address] [place] [address]  [phone] Office  [phone] Facsimile           Date:  20     Name:  Charles Baltazar  :  1940  MRN:  [id number]     PCP:  Mj Aparicio MD    Chief Complaint   Patient presents with    Parkinsons Disease    Gait Problem     poor balance    Neuropathy     BLE, worsening     HISTORY OF PRESENT ILLNESS: Mrs. Amisha Peña is a 22-year-old, right-handed,  woman who presents today for follow-up of Parkinson's disease and peripheral neuropathy. At her last office visit, she had indicated that she felt as though she was doing quite well on Sinemet. Her balance and tremors which were her primary symptoms were much better since starting the Sinemet and the tremor is barely noticeable and certainly not bothersome. She will feel stiff and heavy particularly if she has been sitting for a while. This is actually most prominent when she first wakes up in the morning. She mentions today that her balance does seem to be more off than it was but this seems more related to the neuropathy than the Parkinson's disease. The numbness drives her crazy. She does have some burning sensations but this does not really bother her. Due to the balance issues, especially when walking on uneven surfaces like grass, she is not playing golf anymore. She has also developed a prominent numbness that goes from her left lateral knee down to her ankle. She denies falls, slowness, freezing or wearing off and off time. No issues with voice quality or swallowing. She has not had any sudden, erratic movements. Denies issues with memory loss, depression/anxiety, difficulty sleeping, issues with talking in sleep or acting out dreams, hallucinations/delusions. No constipation or nausea.   no lightheadedness or positional dizziness. No compulsive behaviors/urges. She is independent with ADLs. Recap from LOV:  Parkinson's disease and idiopathic peripheral neuropathy are well managed at this point. She is fairly happy with her present therapy of Sinemet and. The heaviness that was in her legs from her previous office visit seems to have resolved with the addition of the [address] [facility] [address]. She did mention today that on occasion she does have some difficulty with becoming dizzy when she stands. We discussed potential treatment options for this. It is only occasionally and she is not interested in adding any additional medications at this time. We did discuss conservative management with potentially using compression stockings increasing fluid intake, and even potentially decreasing the dose of her antihypertensive. Follow-up in 4 months    Current Outpatient Medications   Medication Sig    isosorbide mononitrate (ISMO, MONOKET) 20 mg tablet Take  by mouth daily.  lisinopril (PRINIVIL, ZESTRIL) 10 mg tablet TAKE 1 TABLET BY MOUTH ONCE DAILY    carbidopa-levodopa (SINEMET)  mg per tablet 1 po at 7am, 11 am, 3pm and 7pm    rasagiline (AZILECT) 1 mg tablet Take one half tablet daily for one week then increase to one tablet daily thereafter    ascorbic acid, vitamin C, (VITAMIN C) 500 mg tablet Take  by mouth.  azithromycin (ZITHROMAX) 250 mg tablet Take 250 mg by mouth three (3) days a week.  turmeric 400 mg cap Take  by mouth.  albuterol (PROVENTIL VENTOLIN) 2.5 mg /3 mL (0.083 %) nebulizer solution by Nebulization route once.  rosuvastatin (CRESTOR) 20 mg tablet Take 20 mg by mouth nightly.  KRILL OIL PO Take 1 Cap by mouth daily.  budesonide-formoterol (SYMBICORT) 160-4.5 mcg/actuation HFA inhaler Take 2 Puffs by inhalation two (2) times a day.  magnesium 250 mg tab Take 500 mg by mouth daily.     aclidinium bromide (TUDORZA PRESSAIR) 400 mcg/actuation inhaler Take 1 Puff by inhalation daily.  cholecalciferol (VITAMIN D3) 1,000 unit tablet Take 1,000 Units by mouth daily.  albuterol (PROVENTIL HFA, VENTOLIN HFA) 90 mcg/actuation inhaler Take 2 Puffs by inhalation every four (4) hours as needed for Wheezing.  ASPIRIN 81 mg chewable tablet Take 81 mg by mouth every other day.  lisinopril-hydroCHLOROthiazide (PRINZIDE, ZESTORETIC) 10-12.5 mg per tablet Take 1 Tab by mouth daily for 90 days. No current facility-administered medications for this visit.       Allergies   Allergen Reactions    Augmentin [Amoxicillin-Pot Clavulanate] Nausea Only     Past Medical History:   Diagnosis Date    Asthma 10/11/2012    CAD (coronary artery disease) 4/3/2018    Cancer ([facility] [address]     bladder cancer    Chronic obstructive pulmonary disease (HCC)     GERD (gastroesophageal reflux disease) 2009    HTN 2009    Menopausal state 2009    Serum lipid, elevated 2009     Past Surgical History:   Procedure Laterality Date    CABG, ARTERY-VEIN, THREE  2016    Cabg x 3    COLONOSCOPY N/A 2016    COLONOSCOPY performed by Cas Quijano MD at [address] [facility] HX CHOLECYSTECTOMY      HX HEENT      wisdom teeth extraction    HX TONSILLECTOMY      HX UROLOGICAL      TURBT for bladder cancer; recurrent cystoscopies     Social History     Socioeconomic History    Marital status:      Spouse name: Not on file    Number of children: Not on file    Years of education: Not on file    Highest education level: Not on file   Occupational History    Not on file   Social Needs    Financial resource strain: Not on file    Food insecurity     Worry: Not on file     Inability: Not on file    Transportation needs     Medical: Not on file     Non-medical: Not on file   Tobacco Use    Smoking status: Former Smoker     Packs/day: 1.00     Years: 20.00     Pack years: 20.00     Types: Cigarettes     Last attempt to quit: 1980     Years since quittin.4    Smokeless tobacco: Never Used   Substance and Sexual Activity    Alcohol use: Yes     Alcohol/week: 1.0 standard drinks     Types: 1 Glasses of wine per week     Comment: daily glass of wine    Drug use: No    Sexual activity: Never   Lifestyle    Physical activity     Days per week: Not on file     Minutes per session: Not on file    Stress: Not on file   Relationships    Social connections     Talks on phone: Not on file     Gets together: Not on file     Attends Restorationism service: Not on file     Active member of club or organization: Not on file     Attends meetings of clubs or organizations: Not on file     Relationship status: Not on file    Intimate partner violence     Fear of current or ex partner: Not on file     Emotionally abused: Not on file     Physically abused: Not on file     Forced sexual activity: Not on file   Other Topics Concern    Not on file   Social History Narrative    Not on file     Family History   Problem Relation Age of Onset    Heart Attack Mother     Hypertension Mother     High Cholesterol Mother     Hypertension Father     Hypertension Brother        PHYSICAL EXAMINATION:    Visit Vitals  /62   Pulse 68   Temp 97.8 °F (36.6 °C)   Resp 18   Ht 5' 4\" (1.626 m)   Wt 53.3 kg (117 lb 6.4 oz)   SpO2 98%   BMI 20.15 kg/m²     General:  Well defined, nourished, and groomed individual in no acute distress. Neck: Supple, nontender, no bruits, no pain with resistance to active range of motion. Heart: Regular rate and rhythm, no murmurs, rub, or gallop. Normal S1S2. Lungs:  Clear to auscultation bilaterally with equal chest expansion, no cough, no wheeze  Musculoskeletal:  Extremities revealed no edema and had full range of motion of joints. Psych:  Good mood and bright affect    NEUROLOGICAL EXAMINATION:     Mental Status:   Alert and oriented to person, place, and time with recent and remote memory intact.   Attention span and concentration are normal. Speech is fluent with a full fund of knowledge. Cranial Nerves:    II, III, IV, VI:  Visual acuity grossly intact. Visual fields are normal.    Pupils are equal, round, and reactive to light and accommodation. Extra-ocular movements are full and fluid. Fundoscopic exam was benign, no ptosis or nystagmus. V-XII: Hearing is grossly intact. Facial features are symmetric, with normal sensation and strength. The palate rises symmetrically and the tongue protrudes midline. Sternocleidomastoids 5/5. Motor Examination: Normal tone, bulk, and strength, 5/5 muscle strength throughout. No cogwheel rigidity     Sensory Examination: Stocking glove sensory loss. This is more prominent in the left lateral calf. Coordination: Finger to nose and rapid arm movement testing with mild bradykinesia and left sided dysmetria. No significant resting tremor. Bilateral minimal intention tremor    Gait and Station:  Steady gait with a decreased bilateral arm swing and shuffling. Evidence of sensory ataxia based on foot fall. She does have some mild stiffness. No en bloc turning. No pronator drift. No muscle wasting or fasiculations noted. Reflexes:  DTRs 2+ throughout. ASSESSMENT AND PLAN    ICD-10-CM ICD-9-CM    1. Parkinson's disease ([facility] [address] G20 332.0 REFERRAL TO PHYSICAL THERAPY   2. Sensory ataxia R27.8 781.3 REFERRAL TO PHYSICAL THERAPY   3. 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 She will continue with this as previously prescribed. She is having more issues with balance which seems largely related to sensory ataxia. Will refer to physical therapy for gait and balance training. Will also have therapy work on the left lower leg where there is a decrease in sensation along the distribution of the left peroneal nerve.  Follow up in four months    Aleksandra Tapia NP

## 2020-09-02 DIAGNOSIS — G20 PARKINSON'S DISEASE (HCC): ICD-10-CM

## 2020-09-02 RX ORDER — RASAGILINE 1 MG/1
TABLET ORAL
Qty: 90 TAB | Refills: 0 | Status: SHIPPED | OUTPATIENT
Start: 2020-09-02 | End: 2021-01-27

## 2020-09-18 ENCOUNTER — HOSPITAL ENCOUNTER (OUTPATIENT)
Dept: MRI IMAGING | Age: 80
Discharge: HOME | End: 2020-09-18
Attending: PHYSICAL MEDICINE & REHABILITATION
Payer: MEDICARE

## 2020-09-18 DIAGNOSIS — M79.12 STERNOCLEIDOMASTOID MUSCLE TENDERNESS: ICD-10-CM

## 2020-09-18 DIAGNOSIS — M47.812 CERVICAL SPONDYLOSIS WITHOUT MYELOPATHY: ICD-10-CM

## 2020-09-18 DIAGNOSIS — M54.2 CERVICALGIA: ICD-10-CM

## 2020-09-18 PROCEDURE — 72141 MRI NECK SPINE W/O DYE: CPT

## 2020-09-24 ENCOUNTER — OFFICE VISIT (OUTPATIENT)
Dept: NEUROLOGY | Age: 80
End: 2020-09-24
Payer: MEDICARE

## 2020-09-24 VITALS
DIASTOLIC BLOOD PRESSURE: 62 MMHG | WEIGHT: 113 LBS | TEMPERATURE: 97.3 F | BODY MASS INDEX: 19.4 KG/M2 | SYSTOLIC BLOOD PRESSURE: 118 MMHG

## 2020-09-24 DIAGNOSIS — G57.02 COMPRESSION OF COMMON PERONEAL NERVE OF LEFT LOWER EXTREMITY: ICD-10-CM

## 2020-09-24 DIAGNOSIS — G20 PARKINSON'S DISEASE (HCC): Primary | ICD-10-CM

## 2020-09-24 DIAGNOSIS — G60.9 IDIOPATHIC PERIPHERAL NEUROPATHY: ICD-10-CM

## 2020-09-24 DIAGNOSIS — R27.8 SENSORY ATAXIA: ICD-10-CM

## 2020-09-24 PROCEDURE — G8420 CALC BMI NORM PARAMETERS: HCPCS | Performed by: NURSE PRACTITIONER

## 2020-09-24 PROCEDURE — G8536 NO DOC ELDER MAL SCRN: HCPCS | Performed by: NURSE PRACTITIONER

## 2020-09-24 PROCEDURE — G8399 PT W/DXA RESULTS DOCUMENT: HCPCS | Performed by: NURSE PRACTITIONER

## 2020-09-24 PROCEDURE — 1090F PRES/ABSN URINE INCON ASSESS: CPT | Performed by: NURSE PRACTITIONER

## 2020-09-24 PROCEDURE — G8752 SYS BP LESS 140: HCPCS | Performed by: NURSE PRACTITIONER

## 2020-09-24 PROCEDURE — G8427 DOCREV CUR MEDS BY ELIG CLIN: HCPCS | Performed by: NURSE PRACTITIONER

## 2020-09-24 PROCEDURE — 99215 OFFICE O/P EST HI 40 MIN: CPT | Performed by: NURSE PRACTITIONER

## 2020-09-24 PROCEDURE — G8432 DEP SCR NOT DOC, RNG: HCPCS | Performed by: NURSE PRACTITIONER

## 2020-09-24 PROCEDURE — G8754 DIAS BP LESS 90: HCPCS | Performed by: NURSE PRACTITIONER

## 2020-09-24 PROCEDURE — 1101F PT FALLS ASSESS-DOCD LE1/YR: CPT | Performed by: NURSE PRACTITIONER

## 2020-09-24 RX ORDER — ENTACAPONE 200 MG/1
TABLET ORAL
Qty: 360 TAB | Refills: 2 | Status: SHIPPED | OUTPATIENT
Start: 2020-09-24 | End: 2021-01-27

## 2020-09-24 NOTE — PROGRESS NOTES
[facility] and  [address] at [facility] at [address] [place] [address], [id number] [place] [address] [address]  [phone] Office  [phone] Facsimile           Date:  20     Name:  Kylie Martinez  :  1940  MRN:  [id number]     PCP:  Shar Corrigan MD    Chief Complaint   Patient presents with    Tremors     HISTORY OF PRESENT ILLNESS: Follow-up of Parkinson's disease and peripheral neuropathy. At her last office visit, she had indicated that she felt as though she was doing quite well on Sinemet. Her balance and tremors which were her primary symptoms were much better since starting the Sinemet and the tremor was barely noticeable and certainly not bothersome. She will feel stiff and heavy particularly if she has been sitting for a while. This is actually most prominent when she first wakes up in the morning and seems most related to her arthritic complaints. She mentions today, that she does feel as though the medication is wearing off about 30 minutes before her next dose. Otherwise, she has not had any significant issues with balance, falls, slowness, freezing, voice quality or swallowing. She has not had any sudden, erratic movements. Denies issues with memory loss, depression/anxiety, difficulty sleeping, issues with talking in sleep or acting out dreams, hallucinations/delusions. No constipation or nausea. no lightheadedness or positional dizziness. No compulsive behaviors/urges. She is independent with ADLs. She does have a very long list of questions today regarding Parkinson's disease ranging from the availability of Parkinson's disease support groups, exercise, diet, etc.    Recap from LOV:  Parkinson's disease appears stable on present therapy of Sinemet and Azilect. She will continue with this as previously prescribed.  She is having more issues with balance which seems largely related to sensory ataxia. Will refer to physical therapy for gait and balance training. Will also have therapy work on the left lower leg where there is a decrease in sensation along the distribution of the left peroneal nerve. Follow up in four months    Current Outpatient Medications   Medication Sig    rasagiline (AZILECT) 1 mg tablet TAKE 1/2 (ONE-HALF) TABLET BY MOUTH ONCE DAILY FOR  ONE  WEEK  THEN  INCREASE  TO  ONE  TABLET  BY  MOUTH  DAILY  THEREAFTER    carbidopa-levodopa (SINEMET)  mg per tablet 1 po at 7am, 11 am, 3pm and 7pm    ascorbic acid, vitamin C, (VITAMIN C) 500 mg tablet Take  by mouth.  azithromycin (ZITHROMAX) 250 mg tablet Take 250 mg by mouth three (3) days a week.  turmeric 400 mg cap Take  by mouth.  albuterol (PROVENTIL VENTOLIN) 2.5 mg /3 mL (0.083 %) nebulizer solution by Nebulization route once.  rosuvastatin (CRESTOR) 20 mg tablet Take 20 mg by mouth nightly.  budesonide-formoterol (SYMBICORT) 160-4.5 mcg/actuation HFA inhaler Take 2 Puffs by inhalation two (2) times a day.  magnesium 250 mg tab Take 500 mg by mouth daily.  aclidinium bromide (TUDORZA PRESSAIR) 400 mcg/actuation inhaler Take 1 Puff by inhalation daily.  cholecalciferol (VITAMIN D3) 1,000 unit tablet Take 1,000 Units by mouth daily.  albuterol (PROVENTIL HFA, VENTOLIN HFA) 90 mcg/actuation inhaler Take 2 Puffs by inhalation every four (4) hours as needed for Wheezing.  ASPIRIN 81 mg chewable tablet Take 81 mg by mouth every other day. No current facility-administered medications for this visit.       Allergies   Allergen Reactions    Augmentin [Amoxicillin-Pot Clavulanate] Nausea Only     Past Medical History:   Diagnosis Date    Asthma 10/11/2012    CAD (coronary artery disease) 4/3/2018    Cancer ([facility] [address]     bladder cancer    Chronic obstructive pulmonary disease (HCC)     GERD (gastroesophageal reflux disease) 4/1/2009    HTN 4/1/2009    Menopausal state 2009    Serum lipid, elevated 2009     Past Surgical History:   Procedure Laterality Date    CABG, ARTERY-VEIN, THREE  2016    Cabg x 3    COLONOSCOPY N/A 2016    COLONOSCOPY performed by Derik Bruce MD at [facility] HX CHOLECYSTECTOMY      HX HEENT      wisdom teeth extraction    HX TONSILLECTOMY      HX UROLOGICAL      TURBT for bladder cancer; recurrent cystoscopies     Social History     Socioeconomic History    Marital status:      Spouse name: Not on file    Number of children: Not on file    Years of education: Not on file    Highest education level: Not on file   Occupational History    Not on file   Social Needs    Financial resource strain: Not on file    Food insecurity     Worry: Not on file     Inability: Not on file    Transportation needs     Medical: Not on file     Non-medical: Not on file   Tobacco Use    Smoking status: Former Smoker     Packs/day: 1.00     Years: 20.00     Pack years: 20.00     Types: Cigarettes     Last attempt to quit: 1980     Years since quittin.7    Smokeless tobacco: Never Used   Substance and Sexual Activity    Alcohol use:  Yes     Alcohol/week: 1.0 standard drinks     Types: 1 Glasses of wine per week     Comment: daily glass of wine    Drug use: No    Sexual activity: Never   Lifestyle    Physical activity     Days per week: Not on file     Minutes per session: Not on file    Stress: Not on file   Relationships    Social connections     Talks on phone: Not on file     Gets together: Not on file     Attends [employer] service: Not on file     Active member of club or organization: Not on file     Attends meetings of clubs or organizations: Not on file     Relationship status: Not on file    Intimate partner violence     Fear of current or ex partner: Not on file     Emotionally abused: Not on file     Physically abused: Not on file     Forced sexual activity: Not on file   Other Topics Concern    Not on file   Social History Narrative    Not on file     Family History   Problem Relation Age of Onset    Heart Attack Mother     Hypertension Mother     High Cholesterol Mother     Hypertension Father     Hypertension Brother        PHYSICAL EXAMINATION:    Visit Vitals  /62   Temp 97.3 °F (36.3 °C)   Wt 51.3 kg (113 lb)   BMI 19.40 kg/m²     General:  Well defined, nourished, and groomed individual in no acute distress. Neck: Supple, nontender, no bruits, no pain with resistance to active range of motion. Heart: Regular rate and rhythm, no murmurs, rub, or gallop. Normal S1S2. Lungs:  Clear to auscultation bilaterally with equal chest expansion, no cough, no wheeze  Musculoskeletal:  Extremities revealed no edema and had full range of motion of joints. Psych:  Good mood and bright affect    NEUROLOGICAL EXAMINATION:     Mental Status:   Alert and oriented to person, place, and time with recent and remote memory intact. Attention span and concentration are normal. Speech is fluent with a full fund of knowledge. Cranial Nerves:    II, III, IV, VI:  Visual acuity grossly intact. Visual fields are normal.    Pupils are equal, round, and reactive to light and accommodation. Extra-ocular movements are full and fluid. Fundoscopic exam was benign, no ptosis or nystagmus. V-XII: Hearing is grossly intact. Facial features are symmetric, with normal sensation and strength. The palate rises symmetrically and the tongue protrudes midline. Sternocleidomastoids 5/5. Motor Examination: Normal tone, bulk, and strength, 5/5 muscle strength throughout. No cogwheel rigidity     Sensory Examination: Stocking glove sensory loss. This is more prominent in the left lateral calf. Coordination: Finger to nose and rapid arm movement testing with mild bradykinesia and left sided dysmetria. No significant resting tremor.  Bilateral minimal intention tremor    Gait and Station:  Steady gait with a decreased bilateral arm swing and shuffling. Evidence of sensory ataxia based on foot fall. She does have some mild stiffness. No en bloc turning. No pronator drift. No muscle wasting or fasiculations noted. Reflexes:  DTRs 2+ throughout. ASSESSMENT AND PLAN    ICD-10-CM ICD-9-CM    1. Parkinson's disease ([facility] [address]  G20 332.0 entacapone (COMTAN) 200 mg tablet   2. Sensory ataxia  R27.8 781.3    3. 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 Idiopathic peripheral neuropathy  G60.9 356.9      Parkinson's disease with what sounds like some mild wearing off issues. We will add Comtan to her Sinemet. She will continue taking the Sinemet and Azilect as previously prescribed. She continues to have some increased issues with her balance which is largely related to sensory ataxia and idiopathic peripheral neuropathy. This is otherwise not bothersome and we discussed the fact that if this is not bothering her, I would not necessarily pursue treating it. As explained, this will likely worsen over time. Billy Rahman NP    She did have a very long list of questions today on her cell phone regarding Parkinson's disease ranging from the availability of Parkinson's disease support groups, exercise, diet, etc. we spent about 45 minutes of her office visit today on addressing these questions alone.   Her total office visit time was about 60 minutes

## 2020-09-29 DIAGNOSIS — G20 PARKINSON'S DISEASE (HCC): ICD-10-CM

## 2020-09-29 RX ORDER — CARBIDOPA AND LEVODOPA 25; 100 MG/1; MG/1
TABLET ORAL
Qty: 360 TAB | Refills: 3 | Status: SHIPPED | OUTPATIENT
Start: 2020-09-29 | End: 2021-09-13 | Stop reason: SDUPTHER

## 2020-10-07 RX ORDER — OMEPRAZOLE 20 MG/1
20 CAPSULE, DELAYED RELEASE ORAL DAILY
Qty: 90 CAP | Refills: 0 | Status: SHIPPED | OUTPATIENT
Start: 2020-10-07 | End: 2021-01-05

## 2020-11-16 ENCOUNTER — OFFICE VISIT (OUTPATIENT)
Dept: URGENT CARE | Age: 80
End: 2020-11-16
Payer: MEDICARE

## 2020-11-16 VITALS — OXYGEN SATURATION: 93 % | HEART RATE: 85 BPM | RESPIRATION RATE: 20 BRPM | TEMPERATURE: 98 F

## 2020-11-16 DIAGNOSIS — Z20.822 ENCOUNTER FOR LABORATORY TESTING FOR COVID-19 VIRUS: Primary | ICD-10-CM

## 2020-11-16 PROCEDURE — 1101F PT FALLS ASSESS-DOCD LE1/YR: CPT | Performed by: FAMILY MEDICINE

## 2020-11-16 PROCEDURE — G8427 DOCREV CUR MEDS BY ELIG CLIN: HCPCS | Performed by: FAMILY MEDICINE

## 2020-11-16 PROCEDURE — 99202 OFFICE O/P NEW SF 15 MIN: CPT | Performed by: FAMILY MEDICINE

## 2020-11-16 PROCEDURE — G8432 DEP SCR NOT DOC, RNG: HCPCS | Performed by: FAMILY MEDICINE

## 2020-11-16 PROCEDURE — G8420 CALC BMI NORM PARAMETERS: HCPCS | Performed by: FAMILY MEDICINE

## 2020-11-16 PROCEDURE — G8399 PT W/DXA RESULTS DOCUMENT: HCPCS | Performed by: FAMILY MEDICINE

## 2020-11-16 PROCEDURE — G8536 NO DOC ELDER MAL SCRN: HCPCS | Performed by: FAMILY MEDICINE

## 2020-11-16 PROCEDURE — 1090F PRES/ABSN URINE INCON ASSESS: CPT | Performed by: FAMILY MEDICINE

## 2020-11-16 PROCEDURE — G8756 NO BP MEASURE DOC: HCPCS | Performed by: FAMILY MEDICINE

## 2020-11-16 NOTE — PROGRESS NOTES
This patient was seen in [facility] at [address] [place] [address] Urgent Care while in their vehicle due to COVID-19 pandemic with PPE and focused examination in order to decrease community viral transmission. The patient/guardian gave verbal consent to treat. The history is provided by the patient. Past Medical History:   Diagnosis Date    Asthma 10/11/2012    CAD (coronary artery disease) 4/3/2018    Cancer ([address]     bladder cancer    Chronic obstructive pulmonary disease (HCC)     GERD (gastroesophageal reflux disease) 2009    HTN 2009    Menopausal state 2009    Serum lipid, elevated 2009        Past Surgical History:   Procedure Laterality Date    CABG, ARTERY-VEIN, THREE  2016    Cabg x 3    COLONOSCOPY N/A 2016    COLONOSCOPY performed by Neftali Dowling MD at [address] [place] [address] HX CHOLECYSTECTOMY      HX HEENT      wisdom teeth extraction    HX TONSILLECTOMY      HX UROLOGICAL      TURBT for bladder cancer; recurrent cystoscopies         Family History   Problem Relation Age of Onset    Heart Attack Mother     Hypertension Mother     High Cholesterol Mother     Hypertension Father     Hypertension Brother         Social History     Socioeconomic History    Marital status:      Spouse name: Not on file    Number of children: Not on file    Years of education: Not on file    Highest education level: Not on file   Occupational History    Not on file   Social Needs    Financial resource strain: Not on file    Food insecurity     Worry: Not on file     Inability: Not on file    Transportation needs     Medical: Not on file     Non-medical: Not on file   Tobacco Use    Smoking status: Former Smoker     Packs/day: 1.00     Years: 20.00     Pack years: 20.00     Types: Cigarettes     Last attempt to quit: 1980     Years since quittin.9    Smokeless tobacco: Never Used   Substance and Sexual Activity    Alcohol use:  Yes Alcohol/week: 1.0 standard drinks     Types: 1 Glasses of wine per week     Comment: daily glass of wine    Drug use: No    Sexual activity: Never   Lifestyle    Physical activity     Days per week: Not on file     Minutes per session: Not on file    Stress: Not on file   Relationships    Social connections     Talks on phone: Not on file     Gets together: Not on file     Attends Restoration[employer] service: Not on file     Active member of club or organization: Not on file     Attends meetings of clubs or organizations: Not on file     Relationship status: Not on file    Intimate partner violence     Fear of current or ex partner: Not on file     Emotionally abused: Not on file     Physically abused: Not on file     Forced sexual activity: Not on file   Other Topics Concern    Not on file   Social History Narrative    Not on file                ALLERGIES: Augmentin [amoxicillin-pot clavulanate]    Review of Systems   All other systems reviewed and are negative. Vitals:    11/16/20 1653   Pulse: 85   Resp: 20   Temp: 98 °F (36.7 °C)   SpO2: 93%       Physical Exam  Vitals signs and nursing note reviewed. Constitutional:       General: She is not in acute distress. Appearance: She is not ill-appearing. Pulmonary:      Effort: Pulmonary effort is normal. No respiratory distress. Breath sounds: No wheezing. MDM    Procedures             ICD-10-CM ICD-9-CM    1. Encounter for laboratory testing for COVID-19 virus  Z20.828 V01.79 NOVEL CORONAVIRUS (COVID-19)     No orders of the defined types were placed in this encounter. No results found for any visits on 11/16/20. The patients condition was discussed with the patient and they understand. The patient is to follow up with primary care doctor. If signs and symptoms become worse the pt is to go to the ER. The patient is to take medications as prescribed.

## 2020-11-18 LAB — SARS-COV-2, NAA: NOT DETECTED

## 2021-01-27 ENCOUNTER — OFFICE VISIT (OUTPATIENT)
Dept: NEUROLOGY | Age: 81
End: 2021-01-27
Payer: MEDICARE

## 2021-01-27 VITALS
DIASTOLIC BLOOD PRESSURE: 72 MMHG | HEART RATE: 87 BPM | OXYGEN SATURATION: 96 % | RESPIRATION RATE: 17 BRPM | SYSTOLIC BLOOD PRESSURE: 130 MMHG | TEMPERATURE: 97 F

## 2021-01-27 DIAGNOSIS — G20 PARKINSON'S DISEASE (HCC): Primary | ICD-10-CM

## 2021-01-27 DIAGNOSIS — R27.8 SENSORY ATAXIA: ICD-10-CM

## 2021-01-27 DIAGNOSIS — G60.9 IDIOPATHIC PERIPHERAL NEUROPATHY: ICD-10-CM

## 2021-01-27 PROCEDURE — G8420 CALC BMI NORM PARAMETERS: HCPCS | Performed by: NURSE PRACTITIONER

## 2021-01-27 PROCEDURE — G8399 PT W/DXA RESULTS DOCUMENT: HCPCS | Performed by: NURSE PRACTITIONER

## 2021-01-27 PROCEDURE — G8432 DEP SCR NOT DOC, RNG: HCPCS | Performed by: NURSE PRACTITIONER

## 2021-01-27 PROCEDURE — G8427 DOCREV CUR MEDS BY ELIG CLIN: HCPCS | Performed by: NURSE PRACTITIONER

## 2021-01-27 PROCEDURE — G8754 DIAS BP LESS 90: HCPCS | Performed by: NURSE PRACTITIONER

## 2021-01-27 PROCEDURE — 1090F PRES/ABSN URINE INCON ASSESS: CPT | Performed by: NURSE PRACTITIONER

## 2021-01-27 PROCEDURE — G8752 SYS BP LESS 140: HCPCS | Performed by: NURSE PRACTITIONER

## 2021-01-27 PROCEDURE — G8536 NO DOC ELDER MAL SCRN: HCPCS | Performed by: NURSE PRACTITIONER

## 2021-01-27 PROCEDURE — 1101F PT FALLS ASSESS-DOCD LE1/YR: CPT | Performed by: NURSE PRACTITIONER

## 2021-01-27 PROCEDURE — 99214 OFFICE O/P EST MOD 30 MIN: CPT | Performed by: NURSE PRACTITIONER

## 2021-01-27 NOTE — PROGRESS NOTES
[facility] Neurology Clinics and  [address] at [facility] Neurology [facility] at [facility] [address], [id number] [place] [address] [address]  [phone] Office  [phone] Facsimile           Date:  21     Name:  Yandy Kraft  :  1940  MRN:  [id number]     PCP:  Edy Yanez MD    Chief Complaint   Patient presents with    Follow-up    Parkinsons Disease     HISTORY OF PRESENT ILLNESS: Follow-up of Parkinson's disease and peripheral neuropathy. She continues to indicate that she is doing quite well on Sinemet. Her balance and tremors which were her primary symptoms were much better since starting the Sinemet and the tremor was barely noticeable and certainly not bothersome. She will feel stiff and heavy particularly if she has been sitting for a while. This is actually most prominent when she first wakes up in the morning and seems most related to her arthritic complaints. This is unchanged. At her last office visit, she mentioned some wearing off and was started on Comtan. However, she ran out of the Azilect for a few days and did not notice any difference so decided to stop the Comtan as well. She feels like the extra medication was worse than any symptoms she was having and honestly did not notice a difference when she stopped it. She really has not noticed the wearing off since then. She has not had any significant issues with balance outside of what she expects from the peripheral neuropathy and this is manageable. No falls, slowness, freezing, voice quality or swallowing. She has not had any sudden, erratic movements. Denies issues with memory loss, depression/anxiety, difficulty sleeping, issues with talking in sleep or acting out dreams, hallucinations/delusions. No constipation or nausea. no lightheadedness or positional dizziness. No compulsive behaviors/urges.  She is independent with ADLs. Recap from LOV:  Parkinson's disease with what sounds like some mild wearing off issues. We will add Comtan to her Sinemet. She will continue taking the Sinemet and Azilect as previously prescribed. She continues to have some increased issues with her balance which is largely related to sensory ataxia and idiopathic peripheral neuropathy. This is otherwise not bothersome and we discussed the fact that if this is not bothering her, I would not necessarily pursue treating it. As explained, this will likely worsen over time. Current Outpatient Medications   Medication Sig    carbidopa-levodopa (Sinemet)  mg per tablet 1 po at 7am, 11 am, 3pm and 7pm    ascorbic acid, vitamin C, (VITAMIN C) 500 mg tablet Take  by mouth.  turmeric 400 mg cap Take  by mouth.  albuterol (PROVENTIL VENTOLIN) 2.5 mg /3 mL (0.083 %) nebulizer solution by Nebulization route once.  rosuvastatin (CRESTOR) 20 mg tablet Take 20 mg by mouth nightly.  budesonide-formoterol (SYMBICORT) 160-4.5 mcg/actuation HFA inhaler Take 2 Puffs by inhalation two (2) times a day.  magnesium 250 mg tab Take 500 mg by mouth daily.  aclidinium bromide (TUDORZA PRESSAIR) 400 mcg/actuation inhaler Take 1 Puff by inhalation daily.  cholecalciferol (VITAMIN D3) 1,000 unit tablet Take 1,000 Units by mouth daily.  albuterol (PROVENTIL HFA, VENTOLIN HFA) 90 mcg/actuation inhaler Take 2 Puffs by inhalation every four (4) hours as needed for Wheezing.  ASPIRIN 81 mg chewable tablet Take 81 mg by mouth every other day. No current facility-administered medications for this visit.       Allergies   Allergen Reactions    Augmentin [Amoxicillin-Pot Clavulanate] Nausea Only     Past Medical History:   Diagnosis Date    Asthma 10/11/2012    CAD (coronary artery disease) 4/3/2018    Cancer ([facility] [address]     bladder cancer    Chronic obstructive pulmonary disease (HCC)     GERD (gastroesophageal reflux disease) 4/1/2009    HTN 2009    Menopausal state 2009    Serum lipid, elevated 2009     Past Surgical History:   Procedure Laterality Date    COLONOSCOPY N/A 2016    COLONOS[facility] performed by Manuelito Ahn MD at [facility] HX CHOLECYSTECTOMY      HX HEENT      wisdom teeth extraction    HX TONSILLECTOMY      HX UROLOGICAL      TURBT for bladder cancer; recurrent cystoscopies    [place] CABG, ARTERY-VEIN, THREE  2016    Cabg x 3     Social History     Socioeconomic History    Marital status:      Spouse name: Not on file    Number of children: Not on file    Years of education: Not on file    Highest education level: Not on file   Occupational History    Not on file   Social Needs    Financial resource strain: Not on file    Food insecurity     Worry: Not on file     Inability: Not on file    Transportation needs     Medical: Not on file     Non-medical: Not on file   Tobacco Use    Smoking status: Former Smoker     Packs/day: 1.00     Years: 20.00     Pack years: 20.00     Types: Cigarettes     Quit date: 1980     Years since quittin.1    Smokeless tobacco: Never Used   Substance and Sexual Activity    Alcohol use:  Yes     Alcohol/week: 1.0 standard drinks     Types: 1 Glasses of wine per week     Comment: daily glass of wine    Drug use: No    Sexual activity: Never   Lifestyle    Physical activity     Days per week: Not on file     Minutes per session: Not on file    Stress: Not on file   Relationships    Social connections     Talks on phone: Not on file     Gets together: Not on file     Attends Druze service: Not on file     Active member of club or organization: Not on file     Attends meetings of clubs or organizations: Not on file     Relationship status: Not on file    Intimate partner violence     Fear of current or ex partner: Not on file     Emotionally abused: Not on file     Physically abused: Not on file     Forced sexual activity: Not on file Other Topics Concern    Not on file   Social History Narrative    Not on file     Family History   Problem Relation Age of Onset    Heart Attack Mother     Hypertension Mother     High Cholesterol Mother     Hypertension Father     Hypertension Brother        PHYSICAL EXAMINATION:    Visit Vitals  /72 (BP 1 Location: Left arm, BP Patient Position: Sitting)   Pulse 87   Temp 97 °F (36.1 °C)   Resp 17   SpO2 96%     General:  Well defined, nourished, and groomed individual in no acute distress. Neck: Supple, nontender, no bruits, no pain with resistance to active range of motion. Heart: Regular rate and rhythm, no murmurs, rub, or gallop. Normal S1S2. Lungs:  Clear to auscultation bilaterally with equal chest expansion, no cough, no wheeze  Musculoskeletal:  Extremities revealed no edema and had full range of motion of joints. Psych:  Good mood and bright affect    NEUROLOGICAL EXAMINATION:     Mental Status:   Alert and oriented to person, place, and time with recent and remote memory intact. Attention span and concentration are normal. Speech is fluent with a full fund of knowledge. Cranial Nerves:    II, III, IV, VI:  Visual acuity grossly intact. Visual fields are normal.    Pupils are equal, round, and reactive to light and accommodation. Extra-ocular movements are full and fluid. Fundoscopic exam was benign, no ptosis or nystagmus. V-XII: Hearing is grossly intact. Facial features are symmetric, with normal sensation and strength. The palate rises symmetrically and the tongue protrudes midline. Sternocleidomastoids 5/5. Motor Examination: Normal tone, bulk, and strength, 5/5 muscle strength throughout. No cogwheel rigidity     Sensory Examination: Stocking glove sensory loss. This is more prominent in the left lateral calf. Coordination: Finger to nose and rapid arm movement testing with mild bradykinesia and left sided dysmetria. No significant resting tremor. Bilateral minimal intention tremor    Gait and Station:  Steady gait with a decreased bilateral arm swing and shuffling. Evidence of sensory ataxia based on foot fall. She does have some mild stiffness. No en bloc turning. No pronator drift. No muscle wasting or fasiculations noted. Reflexes:  DTRs 2+ throughout. ASSESSMENT AND PLAN    ICD-10-CM ICD-9-CM    1. Parkinson's disease ([facility] [address]  G20 332.0    2. Sensory ataxia  R27.8 781.3    3. Idiopathic peripheral neuropathy  G60.9 356.9      Despite discontinuing the Azilect and the Comtan, she has not noticed any this with regard to her Parkinson's disease. The tremor and balance issues seem to be very well managed with the Sinemet alone. She will continue with this as previously prescribed without any changes or additions. Peripheral neuropathy is about the same and is manageable. She does continue to have some mild sensory ataxia but it is manageable and not bothersome. Follow-up in 4 months or sooner if needed.     Bayron Mcclendon NP

## 2021-04-30 ENCOUNTER — OFFICE VISIT (OUTPATIENT)
Dept: INTERNAL MEDICINE CLINIC | Age: 81
End: 2021-04-30
Payer: MEDICARE

## 2021-04-30 VITALS
BODY MASS INDEX: 20.04 KG/M2 | OXYGEN SATURATION: 93 % | HEIGHT: 64 IN | TEMPERATURE: 97.5 F | DIASTOLIC BLOOD PRESSURE: 66 MMHG | RESPIRATION RATE: 20 BRPM | SYSTOLIC BLOOD PRESSURE: 127 MMHG | HEART RATE: 90 BPM | WEIGHT: 117.4 LBS

## 2021-04-30 DIAGNOSIS — I25.10 CORONARY ARTERY DISEASE INVOLVING NATIVE HEART WITHOUT ANGINA PECTORIS, UNSPECIFIED VESSEL OR LESION TYPE: ICD-10-CM

## 2021-04-30 DIAGNOSIS — G20 PARKINSON DISEASE (HCC): ICD-10-CM

## 2021-04-30 DIAGNOSIS — Z85.51 HISTORY OF BLADDER CANCER: ICD-10-CM

## 2021-04-30 DIAGNOSIS — J43.9 PULMONARY EMPHYSEMA, UNSPECIFIED EMPHYSEMA TYPE (HCC): ICD-10-CM

## 2021-04-30 DIAGNOSIS — I10 ESSENTIAL HYPERTENSION: ICD-10-CM

## 2021-04-30 DIAGNOSIS — G62.9 NEUROPATHY: ICD-10-CM

## 2021-04-30 DIAGNOSIS — E78.5 DYSLIPIDEMIA: ICD-10-CM

## 2021-04-30 DIAGNOSIS — Z00.00 MEDICARE ANNUAL WELLNESS VISIT, SUBSEQUENT: Primary | ICD-10-CM

## 2021-04-30 PROBLEM — J44.1 COPD EXACERBATION (HCC): Status: RESOLVED | Noted: 2018-04-03 | Resolved: 2021-04-30

## 2021-04-30 LAB
ALBUMIN SERPL-MCNC: 4.1 G/DL (ref 3.5–5)
ALBUMIN/GLOB SERPL: 1.5 {RATIO} (ref 1.1–2.2)
ALP SERPL-CCNC: 83 U/L (ref 45–117)
ALT SERPL-CCNC: 11 U/L (ref 12–78)
AMORPH CRY URNS QL MICRO: ABNORMAL
ANION GAP SERPL CALC-SCNC: 5 MMOL/L (ref 5–15)
APPEARANCE UR: CLEAR
AST SERPL-CCNC: 16 U/L (ref 15–37)
BACTERIA URNS QL MICRO: NEGATIVE /HPF
BILIRUB SERPL-MCNC: 1.5 MG/DL (ref 0.2–1)
BILIRUB UR QL: NEGATIVE
BUN SERPL-MCNC: 18 MG/DL (ref 6–20)
BUN/CREAT SERPL: 28 (ref 12–20)
CALCIUM SERPL-MCNC: 9.3 MG/DL (ref 8.5–10.1)
CHLORIDE SERPL-SCNC: 106 MMOL/L (ref 97–108)
CHOLEST SERPL-MCNC: 134 MG/DL
CO2 SERPL-SCNC: 29 MMOL/L (ref 21–32)
COLOR UR: ABNORMAL
CREAT SERPL-MCNC: 0.65 MG/DL (ref 0.55–1.02)
EPITH CASTS URNS QL MICRO: ABNORMAL /LPF
ERYTHROCYTE [DISTWIDTH] IN BLOOD BY AUTOMATED COUNT: 13.9 % (ref 11.5–14.5)
GLOBULIN SER CALC-MCNC: 2.8 G/DL (ref 2–4)
GLUCOSE SERPL-MCNC: 98 MG/DL (ref 65–100)
GLUCOSE UR STRIP.AUTO-MCNC: NEGATIVE MG/DL
HCT VFR BLD AUTO: 47.3 % (ref 35–47)
HDLC SERPL-MCNC: 84 MG/DL
HDLC SERPL: 1.6 {RATIO} (ref 0–5)
HGB BLD-MCNC: 14.7 G/DL (ref 11.5–16)
HGB UR QL STRIP: NEGATIVE
HYALINE CASTS URNS QL MICRO: ABNORMAL /LPF (ref 0–5)
KETONES UR QL STRIP.AUTO: NEGATIVE MG/DL
LDLC SERPL CALC-MCNC: 35.4 MG/DL (ref 0–100)
LEUKOCYTE ESTERASE UR QL STRIP.AUTO: ABNORMAL
LIPID PROFILE,FLP: NORMAL
MCH RBC QN AUTO: 30.9 PG (ref 26–34)
MCHC RBC AUTO-ENTMCNC: 31.1 G/DL (ref 30–36.5)
MCV RBC AUTO: 99.6 FL (ref 80–99)
MUCOUS THREADS URNS QL MICRO: ABNORMAL /LPF
NITRITE UR QL STRIP.AUTO: NEGATIVE
NRBC # BLD: 0 K/UL (ref 0–0.01)
NRBC BLD-RTO: 0 PER 100 WBC
OTHER,OTHU: ABNORMAL
PH UR STRIP: 7 [PH] (ref 5–8)
PLATELET # BLD AUTO: 240 K/UL (ref 150–400)
PMV BLD AUTO: 11.3 FL (ref 8.9–12.9)
POTASSIUM SERPL-SCNC: 4.8 MMOL/L (ref 3.5–5.1)
PROT SERPL-MCNC: 6.9 G/DL (ref 6.4–8.2)
PROT UR STRIP-MCNC: ABNORMAL MG/DL
RBC # BLD AUTO: 4.75 M/UL (ref 3.8–5.2)
RBC #/AREA URNS HPF: ABNORMAL /HPF (ref 0–5)
SODIUM SERPL-SCNC: 140 MMOL/L (ref 136–145)
SP GR UR REFRACTOMETRY: 1.01 (ref 1–1.03)
TRIGL SERPL-MCNC: 73 MG/DL (ref ?–150)
UA: UC IF INDICATED,UAUC: ABNORMAL
UROBILINOGEN UR QL STRIP.AUTO: 0.2 EU/DL (ref 0.2–1)
VLDLC SERPL CALC-MCNC: 14.6 MG/DL
WBC # BLD AUTO: 8.3 K/UL (ref 3.6–11)
WBC URNS QL MICRO: ABNORMAL /HPF (ref 0–4)

## 2021-04-30 PROCEDURE — G8510 SCR DEP NEG, NO PLAN REQD: HCPCS | Performed by: INTERNAL MEDICINE

## 2021-04-30 PROCEDURE — G0463 HOSPITAL OUTPT CLINIC VISIT: HCPCS | Performed by: INTERNAL MEDICINE

## 2021-04-30 PROCEDURE — G0439 PPPS, SUBSEQ VISIT: HCPCS | Performed by: INTERNAL MEDICINE

## 2021-04-30 PROCEDURE — G8752 SYS BP LESS 140: HCPCS | Performed by: INTERNAL MEDICINE

## 2021-04-30 PROCEDURE — G8420 CALC BMI NORM PARAMETERS: HCPCS | Performed by: INTERNAL MEDICINE

## 2021-04-30 PROCEDURE — G8536 NO DOC ELDER MAL SCRN: HCPCS | Performed by: INTERNAL MEDICINE

## 2021-04-30 PROCEDURE — 1090F PRES/ABSN URINE INCON ASSESS: CPT | Performed by: INTERNAL MEDICINE

## 2021-04-30 PROCEDURE — 99215 OFFICE O/P EST HI 40 MIN: CPT | Performed by: INTERNAL MEDICINE

## 2021-04-30 PROCEDURE — G8399 PT W/DXA RESULTS DOCUMENT: HCPCS | Performed by: INTERNAL MEDICINE

## 2021-04-30 PROCEDURE — G8754 DIAS BP LESS 90: HCPCS | Performed by: INTERNAL MEDICINE

## 2021-04-30 PROCEDURE — 1101F PT FALLS ASSESS-DOCD LE1/YR: CPT | Performed by: INTERNAL MEDICINE

## 2021-04-30 PROCEDURE — G8427 DOCREV CUR MEDS BY ELIG CLIN: HCPCS | Performed by: INTERNAL MEDICINE

## 2021-04-30 NOTE — PROGRESS NOTES
This is the Subsequent Medicare Annual Wellness Exam, performed 12 months or more after the Initial AWV or the last Subsequent AWV    I have reviewed the patient's medical history in detail and updated the computerized patient record. Assessment/Plan   Education and counseling provided:  Are appropriate based on today's review and evaluation    1. Medicare annual wellness visit, subsequent  2. Essential hypertension  3. Coronary artery disease involving native heart without angina pectoris, unspecified vessel or lesion type  4. Pulmonary emphysema, unspecified emphysema type ([facility]  5. Dyslipidemia  6. Parkinson disease ([facility]       Depression Risk Factor Screening     3 most recent PHQ Screens 4/30/2021   Little interest or pleasure in doing things Not at all   Feeling down, depressed, irritable, or hopeless Not at all   Total Score PHQ 2 0       Alcohol Risk Screen    Do you average more than 1 drink per night or more than 7 drinks a week:  No    On any one occasion in the past three months have you have had more than 3 drinks containing alcohol:  No        Functional Ability and Level of Safety    Hearing: Hearing is good. Activities of Daily Living: The home contains: handrails and grab bars  Patient does total self care      Ambulation: with mild difficulty     Fall Risk:  Fall Risk Assessment, last 12 mths 4/30/2021   Able to walk? Yes   Fall in past 12 months? 0   Do you feel unsteady? 0   Are you worried about falling 0   Number of falls in past 12 months -   Fall with injury?  -      Abuse Screen:  Patient is not abused       Cognitive Screening    Has your family/caregiver stated any concerns about your memory: no     Cognitive Screening: Normal - MMSE (Mini Mental Status Exam)    Health Maintenance Due     Health Maintenance Due   Topic Date Due    Hepatitis C Screening  Never done    COVID-19 Vaccine (1) Never done    Shingrix Vaccine Age 50> (1 of 2) Never done    Medicare Yearly Exam 01/17/2021       Patient Care Team   Patient Care Team:  Michelle May MD as PCP - Hannah Moreland MD as PCP - [facility]  Jarvis Barnes MD (Pulmonary Disease)  Ashley Gonzalez MD as Consulting Provider (Pulmonary Disease)    History     Patient Active Problem List   Diagnosis Code    HTN (hypertension) I10    Serum lipids high E78.5    GERD (gastroesophageal reflux disease) K21.9    Menopausal state N95.1    Asthma J45.909    ACP (advance care planning) Z71.89    Acute respiratory failure with hypoxia ([address] J96.01    Sinus tachycardia R00.0    CAD (coronary artery disease) I25.10    S/P CABG (coronary artery bypass graft) Z95.1    History of bladder cancer Z85.51     Past Medical History:   Diagnosis Date    Asthma 10/11/2012    CAD (coronary artery disease) 4/3/2018    Cancer ([facility]     bladder cancer    Chronic obstructive pulmonary disease ([facility]     GERD (gastroesophageal reflux disease) 4/1/2009    HTN 4/1/2009    Menopausal state 4/1/2009    Serum lipid, elevated 4/1/2009      Past Surgical History:   Procedure Laterality Date    COLONOSCOPY N/A 8/31/2016    COLONOSCOPY performed by Rhys Zimmerman MD at [facility] HX CHOLECYSTECTOMY  2012    HX HEENT      wisdom teeth extraction    HX TONSILLECTOMY      HX UROLOGICAL      TURBT for bladder cancer; recurrent cystoscopies    DE CABG, ARTERY-VEIN, THREE  03/22/2016    Cabg x 3     Current Outpatient Medications   Medication Sig Dispense Refill    carbidopa-levodopa (Sinemet)  mg per tablet 1 po at 7am, 11 am, 3pm and 7pm 360 Tab 3    ascorbic acid, vitamin C, (VITAMIN C) 500 mg tablet Take  by mouth.  turmeric 400 mg cap Take  by mouth.  albuterol (PROVENTIL VENTOLIN) 2.5 mg /3 mL (0.083 %) nebulizer solution by Nebulization route once.  rosuvastatin (CRESTOR) 20 mg tablet Take 20 mg by mouth nightly.       budesonide-formoterol (SYMBICORT) 160-4.5 mcg/actuation HFA inhaler Take 2 Puffs by inhalation two (2) times a day. 3 Inhaler 1    magnesium 250 mg tab Take 500 mg by mouth daily.  aclidinium bromide (TUDORZA PRESSAIR) 400 mcg/actuation inhaler Take 1 Puff by inhalation daily.  cholecalciferol (VITAMIN D3) 1,000 unit tablet Take 1,000 Units by mouth daily.  albuterol (PROVENTIL HFA, VENTOLIN HFA) 90 mcg/actuation inhaler Take 2 Puffs by inhalation every four (4) hours as needed for Wheezing. 1 Inhaler 2    ASPIRIN 81 mg chewable tablet Take 81 mg by mouth every other day. Allergies   Allergen Reactions    Augmentin [Amoxicillin-Pot Clavulanate] Nausea Only       Family History   Problem Relation Age of Onset    Heart Attack Mother     Hypertension Mother     High Cholesterol Mother     Hypertension Father     Hypertension Brother      Social History     Tobacco Use    Smoking status: Former Smoker     Packs/day: 1.00     Years: 20.00     Pack years: 20.00     Types: Cigarettes     Quit date: 1980     Years since quittin.3    Smokeless tobacco: Never Used   Substance Use Topics    Alcohol use:  Yes     Alcohol/week: 1.0 standard drinks     Types: 1 Glasses of wine per week     Comment: daily glass of wine         Natasha Lawrence MD

## 2021-04-30 NOTE — PATIENT INSTRUCTIONS

## 2021-04-30 NOTE — PROGRESS NOTES
Agapito Sanchez (: 1940) is a 68 y.o. female, established patient, here for evaluation of the following chief complaint(s):  Follow-up       ASSESSMENT/PLAN:  Below is the assessment and plan developed based on review of pertinent history, physical exam, labs, studies, and medications. 1. Medicare annual wellness visit, subsequent    2. Essential hypertension  BP is at goal. I do not recommend any change in management. -     CBC W/O DIFF; Future    3. Coronary artery disease involving native heart without angina pectoris, unspecified vessel or lesion type  Stable and well managed. Continue on ASA 36FD.   -     METABOLIC PANEL, COMPREHENSIVE; Future  -     LIPID PANEL; Future    4. Pulmonary emphysema, unspecified emphysema type ([facility]  Stable and well-managed. Continue with ongoing regimen of Symbicort and albuterol inhaler. 5. Dyslipidemia  Stable, patient is tolerating medications, no myalgias. I do not recommend any change in current regimen of rosuvastatin. -     METABOLIC PANEL, COMPREHENSIVE; Future  -     LIPID PANEL; Future    6. Parkinson disease ([facility]  Stable and well-managed. Continue with ongoing regimen of carbidopa-levodopa. Managed by NP Efe Gomez. 7. Neuropathy  Patient is c/o numbness/tingling in her feet and L leg. She was unable to tolerate gabapentin. Will continue to monitor for improvements or changes. 8. History of bladder cancer  Will check a UA.   -     URINALYSIS W/ REFLEX CULTURE; Future    No follow-ups on file. SUBJECTIVE/OBJECTIVE:  HPI  Hypertension ROS: taking medications as instructed, no medication side effects noted, no TIA's, no chest pain on exertion, no dyspnea on exertion, no swelling of ankles. New concerns: She states her BP's at home have been around 127/66. She was unable to tolerate lisinopril due to dizziness. CAD: Stable and well managed. COPD: Stable, she continues to comply with inhalers.      Hyperlipidemia:  Cardiovascular risk analysis - 68 y.o. female LDL goal is under 130. ROS: taking medications as instructed, no medication side effects noted, no TIA's, no chest pain on exertion, no dyspnea on exertion, no swelling of ankles. Tolerating meds, no myalgias or other side effects noted. New concerns: Pt's last LDL was 40.8 on 01/17/20. She continues on rosuvastatin. Parkinson's: Continues on Carbidopa-Levadopa  mg QID (per AKILA Austin). She was started on Comtan by AKILA Austin, but d/c because her sxs were improving. Denies recent falls. Neuropathy: Patient reports numbness and tingling in her feet as well as numbness in her left leg. She could not tolerate gabapentin. Review of Systems   Neurological: Positive for numbness (feet and L leg). +Parkinson's disease   All other systems reviewed and are negative. Physical Exam  Constitutional:       Appearance: Normal appearance. HENT:      Right Ear: Tympanic membrane and external ear normal.      Left Ear: Tympanic membrane and external ear normal.      Mouth/Throat:      Mouth: Mucous membranes are moist.      Pharynx: Oropharynx is clear. Cardiovascular:      Rate and Rhythm: Normal rate and regular rhythm. Pulses: Normal pulses. Heart sounds: Normal heart sounds. Pulmonary:      Effort: Pulmonary effort is normal.      Breath sounds: Normal breath sounds. Musculoskeletal: Normal range of motion. Skin:     General: Skin is warm and dry. Neurological:      General: No focal deficit present. Mental Status: She is oriented to person, place, and time. Psychiatric:         Mood and Affect: Mood normal.         Behavior: Behavior normal.       On this date 04/30/2021 I have spent 40 minutes reviewing previous notes, test results and face to face with the patient discussing the diagnosis and importance of compliance with the treatment plan as well as documenting on the day of the visit.     Lab results and schedule of future lab studies reviewed with patient. Reviewed diet, exercise and weight control. Written by Lola Casas, as dictated by Dean Haas MD.     Current diagnosis and concerns discussed with pt at length. Understands risks and benefits or current treatment plan and medications and accepts the treatment and medication with any possible risks. Pt asks appropriate questions which were answered. Pt instructed to call with any concerns or problems. An electronic signature was used to authenticate this note.   -- Lamont Venegas

## 2021-05-24 RX ORDER — NITROFURANTOIN 25; 75 MG/1; MG/1
100 CAPSULE ORAL 2 TIMES DAILY
Qty: 14 CAPSULE | Refills: 0 | Status: SHIPPED | OUTPATIENT
Start: 2021-05-24 | End: 2021-06-21 | Stop reason: ALTCHOICE

## 2021-06-01 ENCOUNTER — TRANSCRIBE ORDER (OUTPATIENT)
Dept: SCHEDULING | Age: 81
End: 2021-06-01

## 2021-06-01 DIAGNOSIS — J44.9 COPD (CHRONIC OBSTRUCTIVE PULMONARY DISEASE) (HCC): Primary | ICD-10-CM

## 2021-06-08 ENCOUNTER — OFFICE VISIT (OUTPATIENT)
Dept: NEUROLOGY | Age: 81
End: 2021-06-08

## 2021-06-08 VITALS
OXYGEN SATURATION: 96 % | HEART RATE: 102 BPM | DIASTOLIC BLOOD PRESSURE: 90 MMHG | WEIGHT: 117 LBS | RESPIRATION RATE: 18 BRPM | SYSTOLIC BLOOD PRESSURE: 150 MMHG | BODY MASS INDEX: 19.97 KG/M2 | HEIGHT: 64 IN

## 2021-06-08 RX ORDER — OMEPRAZOLE 20 MG/1
CAPSULE, DELAYED RELEASE ORAL AS NEEDED
COMMUNITY
End: 2021-08-04 | Stop reason: SDUPTHER

## 2021-06-08 RX ORDER — AZITHROMYCIN 250 MG/1
TABLET, FILM COATED ORAL
COMMUNITY
Start: 2021-03-16

## 2021-06-08 NOTE — PROGRESS NOTES
Lupe Iqbal is a 68 y.o. female    Chief Complaint   Patient presents with    Follow-up     4 mo    Other     Parkinson's f/u    pt states she is in \"fair\"condition     Health Maintenance Due   Topic Date Due    Hepatitis C Screening  Never done    Shingrix Vaccine Age 50> (1 of 2) Never done    COVID-19 Vaccine (2 - Moderna 2-dose series) 03/26/2021       Visit Vitals  BP (!) 150/90 (BP 1 Location: Right arm, BP Patient Position: Sitting)   Pulse (!) 102   Resp 18   Ht 5' 4\" (1.626 m)   Wt 53.1 kg (117 lb)   SpO2 96%   BMI 20.08 kg/m²       3 most recent PHQ Screens 6/8/2021   Little interest or pleasure in doing things Not at all   Feeling down, depressed, irritable, or hopeless Not at all   Total Score PHQ 2 0       Fall Risk Assessment, last 12 mths 6/8/2021   Able to walk? Yes   Fall in past 12 months? 0   Do you feel unsteady? 0   Are you worried about falling 0   Number of falls in past 12 months -   Fall with injury? -       Abuse Screening Questionnaire 6/8/2021   Do you ever feel afraid of your partner? N   Are you in a relationship with someone who physically or mentally threatens you? N   Is it safe for you to go home? Y         1. Have you been to the ER, urgent care clinic since your last visit? Hospitalized since your last visit?no    2. Have you seen or consulted any other health care providers outside of the [address] [place] [address] since your last visit? Include any pap smears or colon screening. yes pulmonologist and cardiologist [facility]

## 2021-06-16 DIAGNOSIS — J44.9 CHRONIC OBSTRUCTIVE PULMONARY DISEASE, UNSPECIFIED COPD TYPE (HCC): ICD-10-CM

## 2021-06-16 RX ORDER — DOXYCYCLINE 100 MG/1
100 CAPSULE ORAL 2 TIMES DAILY
Qty: 20 CAPSULE | Refills: 0 | Status: SHIPPED | OUTPATIENT
Start: 2021-06-16 | End: 2021-06-21 | Stop reason: ALTCHOICE

## 2021-06-16 RX ORDER — PREDNISONE 20 MG/1
TABLET ORAL
Qty: 30 TABLET | Refills: 0 | Status: SHIPPED | OUTPATIENT
Start: 2021-06-16 | End: 2021-06-21 | Stop reason: ALTCHOICE

## 2021-06-21 ENCOUNTER — OFFICE VISIT (OUTPATIENT)
Dept: NEUROLOGY | Age: 81
End: 2021-06-21
Payer: MEDICARE

## 2021-06-21 VITALS
SYSTOLIC BLOOD PRESSURE: 128 MMHG | BODY MASS INDEX: 20.08 KG/M2 | HEIGHT: 64 IN | DIASTOLIC BLOOD PRESSURE: 64 MMHG | OXYGEN SATURATION: 93 % | HEART RATE: 55 BPM | RESPIRATION RATE: 16 BRPM

## 2021-06-21 DIAGNOSIS — R27.8 SENSORY ATAXIA: ICD-10-CM

## 2021-06-21 DIAGNOSIS — G20 PARKINSON'S DISEASE (HCC): Primary | ICD-10-CM

## 2021-06-21 DIAGNOSIS — G60.9 IDIOPATHIC PERIPHERAL NEUROPATHY: ICD-10-CM

## 2021-06-21 PROCEDURE — 1090F PRES/ABSN URINE INCON ASSESS: CPT | Performed by: NURSE PRACTITIONER

## 2021-06-21 PROCEDURE — G8752 SYS BP LESS 140: HCPCS | Performed by: NURSE PRACTITIONER

## 2021-06-21 PROCEDURE — G8536 NO DOC ELDER MAL SCRN: HCPCS | Performed by: NURSE PRACTITIONER

## 2021-06-21 PROCEDURE — G8432 DEP SCR NOT DOC, RNG: HCPCS | Performed by: NURSE PRACTITIONER

## 2021-06-21 PROCEDURE — 1101F PT FALLS ASSESS-DOCD LE1/YR: CPT | Performed by: NURSE PRACTITIONER

## 2021-06-21 PROCEDURE — G8420 CALC BMI NORM PARAMETERS: HCPCS | Performed by: NURSE PRACTITIONER

## 2021-06-21 PROCEDURE — G8427 DOCREV CUR MEDS BY ELIG CLIN: HCPCS | Performed by: NURSE PRACTITIONER

## 2021-06-21 PROCEDURE — 99214 OFFICE O/P EST MOD 30 MIN: CPT | Performed by: NURSE PRACTITIONER

## 2021-06-21 PROCEDURE — G8399 PT W/DXA RESULTS DOCUMENT: HCPCS | Performed by: NURSE PRACTITIONER

## 2021-06-21 PROCEDURE — G8754 DIAS BP LESS 90: HCPCS | Performed by: NURSE PRACTITIONER

## 2021-06-21 RX ORDER — ROPINIROLE 2 MG/1
2 TABLET, FILM COATED, EXTENDED RELEASE ORAL
Qty: 30 TABLET | Refills: 3 | Status: SHIPPED | OUTPATIENT
Start: 2021-06-21 | End: 2021-08-04

## 2021-06-21 NOTE — PROGRESS NOTES
[facility] and  [address] at [facility] at [facility] [address], [address] [place] [address] [address]  [phone] Office  [phone] Facsimile           Date:  21     Name:  Alondra Adams  :  1940  MRN:  [id number]     PCP:  Ivonne Singh MD    Chief Complaint   Patient presents with    Numbness     feet and leg burns from neruopathy     Parkinsons Disease     leg issues      HISTORY OF PRESENT ILLNESS: Follow-up of Parkinson's disease and peripheral neuropathy. She feels like she is having some tremor which seems to originate from the inside and is not something that is externally noticeable. PN is bothersome especially in the evening. States that this feels like the nerves are being grated on. Otherwise, she continues to do pretty well on her present dose of Sinemet. Her balance and tremors are significantly better. No significant stiffness or rigidity. No freezing or wearing off. No falls, voice quality or swallowing issues. She has not had any sudden, erratic movements. Denies issues with memory loss, depression/anxiety, difficulty sleeping, issues with talking in sleep or acting out dreams, hallucinations/delusions. No constipation or nausea. no lightheadedness or positional dizziness. No compulsive behaviors/urges. She is independent with ADLs. Recap from LOV:  Despite discontinuing the Azilect and the Comtan, she has not noticed any this with regard to her Parkinson's disease. The tremor and balance issues seem to be very well managed with the Sinemet alone. She will continue with this as previously prescribed without any changes or additions. Peripheral neuropathy is about the same and is manageable. She does continue to have some mild sensory ataxia but it is manageable and not bothersome.   Follow-up in 4 months or sooner if needed. Current Outpatient Medications   Medication Sig    azithromycin (ZITHROMAX) 250 mg tablet TAKE 1 TABLET BY MOUTH THREE TIMES A WEEK    omeprazole (PRILOSEC) 20 mg capsule as needed.  carbidopa-levodopa (Sinemet)  mg per tablet 1 po at 7am, 11 am, 3pm and 7pm    ascorbic acid, vitamin C, (VITAMIN C) 500 mg tablet Take  by mouth.  turmeric 400 mg cap Take  by mouth.  albuterol (PROVENTIL VENTOLIN) 2.5 mg /3 mL (0.083 %) nebulizer solution by Nebulization route once.  rosuvastatin (CRESTOR) 20 mg tablet Take 20 mg by mouth nightly.  budesonide-formoterol (SYMBICORT) 160-4.5 mcg/actuation HFA inhaler Take 2 Puffs by inhalation two (2) times a day.  magnesium 250 mg tab Take 500 mg by mouth daily. Indications: patient is only taking 250mg daily    aclidinium bromide (TUDORZA PRESSAIR) 400 mcg/actuation inhaler Take 1 Puff by inhalation daily.  cholecalciferol (VITAMIN D3) 1,000 unit tablet Take 1,000 Units by mouth daily.  albuterol (PROVENTIL HFA, VENTOLIN HFA) 90 mcg/actuation inhaler Take 2 Puffs by inhalation every four (4) hours as needed for Wheezing.  ASPIRIN 81 mg chewable tablet Take 81 mg by mouth every other day. No current facility-administered medications for this visit.      Allergies   Allergen Reactions    Augmentin [Amoxicillin-Pot Clavulanate] Nausea Only     Past Medical History:   Diagnosis Date    Asthma 10/11/2012    CAD (coronary artery disease) 4/3/2018    Cancer ([facility]     bladder cancer    Chronic obstructive pulmonary disease (HCC)     GERD (gastroesophageal reflux disease) 4/1/2009    HTN 4/1/2009    Menopausal state 4/1/2009    Serum lipid, elevated 4/1/2009     Past Surgical History:   Procedure Laterality Date    COLONOSCOPY N/A 8/31/2016    COLONOSCOPY performed by Terrance Lazo MD at [facility] HX CHOLECYSTECTOMY  2012    HX HEENT      wisdom teeth extraction    HX TONSILLECTOMY      HX UROLOGICAL      TURBT for bladder cancer; recurrent cystoscopies    [place] CABG, ARTERY-VEIN, THREE  2016    Cabg x 3     Social History     Socioeconomic History    Marital status:      Spouse name: Not on file    Number of children: Not on file    Years of education: Not on file    Highest education level: Not on file   Occupational History    Not on file   Tobacco Use    Smoking status: Former Smoker     Packs/day: 1.00     Years: 20.00     Pack years: 20.00     Types: Cigarettes     Quit date: 1980     Years since quittin.4    Smokeless tobacco: Never Used   Substance and Sexual Activity    Alcohol use: Yes     Alcohol/week: 1.0 standard drinks     Types: 1 Glasses of wine per week     Comment: daily glass of wine    Drug use: No    Sexual activity: Never   Other Topics Concern    Not on file   Social History Narrative    Not on file     Social Determinants of Health     Financial Resource Strain:     Difficulty of Paying Living Expenses:    Food Insecurity:     Worried About Running Out of Food in the Last Year:     [address] in the Last Year:    Transportation Needs:     Lack of Transportation (Medical):      Lack of Transportation (Non-Medical):    Physical Activity:     Days of Exercise per Week:     Minutes of Exercise per Session:    Stress:     Feeling of Stress :    Social Connections:     Frequency of Communication with Friends and Family:     Frequency of Social Gatherings with Friends and Family:     Attends Oriental orthodox Services:     Active Member of Clubs or Organizations:     Attends Club or Organization Meetings:     Marital Status:    Intimate Partner Violence:     Fear of Current or Ex-Partner:     Emotionally Abused:     Physically Abused:     Sexually Abused:      Family History   Problem Relation Age of Onset    Heart Attack Mother     Hypertension Mother     High Cholesterol Mother     Hypertension Father     Hypertension Brother        PHYSICAL EXAMINATION: Visit Vitals  /64 (BP 1 Location: Left arm, BP Patient Position: Sitting)   Pulse (!) 55   Resp 16   Ht 5' 4\" (1.626 m)   SpO2 93%   BMI 20.08 kg/m²     General:  Well defined, nourished, and groomed individual in no acute distress. Neck: Supple, nontender, no bruits, no pain with resistance to active range of motion. Heart: Regular rate and rhythm, no murmurs, rub, or gallop. Normal S1S2. Lungs:  Clear to auscultation bilaterally with equal chest expansion, no cough, no wheeze  Musculoskeletal:  Extremities revealed no edema and had full range of motion of joints. Psych:  Good mood and bright affect    NEUROLOGICAL EXAMINATION:     Mental Status:   Alert and oriented to person, place, and time with recent and remote memory intact. Attention span and concentration are normal. Speech is fluent with a full fund of knowledge. Cranial Nerves:    II, III, IV, VI:  Visual acuity grossly intact. Visual fields are normal.    Pupils are equal, round, and reactive to light and accommodation. Extra-ocular movements are full and fluid. Fundoscopic exam was benign, no ptosis or nystagmus. V-XII: Hearing is grossly intact. Facial features are symmetric, with normal sensation and strength. The palate rises symmetrically and the tongue protrudes midline. Sternocleidomastoids 5/5. Motor Examination: Normal tone, bulk, and strength, 5/5 muscle strength throughout. No cogwheel rigidity     Sensory Examination: Stocking glove sensory loss. This is more prominent in the left lateral calf. Coordination: Finger to nose and rapid arm movement testing with mild bradykinesia and left sided dysmetria. No significant resting tremor. Bilateral minimal intention tremor    Gait and Station:  Steady gait with a decreased bilateral arm swing and shuffling. Evidence of sensory ataxia based on foot fall. She does have some mild stiffness. No en bloc turning. No pronator drift.    No muscle wasting or fasiculations noted. Reflexes:  DTRs 2+ throughout. ASSESSMENT AND PLAN    ICD-10-CM ICD-9-CM    1. Parkinson's disease ([facility]  G20 332.0 rOPINIRole (Requip XL) 2 mg Tb24 extended release tablet   2. Idiopathic peripheral neuropathy  G60.9 356.9    3. Sensory ataxia  R27.8 781. 3      Parkinson's disease which by exam looks pretty stable. She did have some complaints of which she calls internal tremors. There was no significant tremors noted externally. For further treatment, she was started on Requip XL 2 mg nightly. She will continue taking the Sinemet with symptom 4 times daily. Peripheral neuropathy is pretty stable. It is still bothersome she does have some sensory ataxia as a result of this as well. We did discuss potential physical therapy but she has done this in the past and does not feel that it is really that beneficial.  Follow-up in 3 months or sooner if needed.     Wojciech Gomez, NP

## 2021-06-21 NOTE — LETTER
6/30/2021    Patient: Agapito Sanchez   YOB: 1940   Date of Visit: 6/21/2021     Nara Mercado MD  [address] [facility]  [address]  [facility]    Dear Nara Mercado MD,      Thank you for referring Ms. Vanessa Quiroz to [facility] for evaluation. My notes for this consultation are attached. If you have questions, please do not hesitate to call me. I look forward to following your patient along with you.       Sincerely,    Miguel Aragon, NP

## 2021-06-21 NOTE — PROGRESS NOTES
Chief Complaint   Patient presents with    Numbness     feet and leg burns from neruopathy     Parkinsons Disease     leg issues      Visit Vitals  /64 (BP 1 Location: Left arm, BP Patient Position: Sitting)   Pulse (!) 55   Resp 16   Ht 5' 4\" (1.626 m)   SpO2 93%   BMI 20.08 kg/m²

## 2021-08-04 ENCOUNTER — OFFICE VISIT (OUTPATIENT)
Dept: INTERNAL MEDICINE CLINIC | Age: 81
End: 2021-08-04
Payer: MEDICARE

## 2021-08-04 VITALS
HEART RATE: 93 BPM | TEMPERATURE: 97.5 F | DIASTOLIC BLOOD PRESSURE: 72 MMHG | WEIGHT: 118.8 LBS | RESPIRATION RATE: 18 BRPM | OXYGEN SATURATION: 93 % | SYSTOLIC BLOOD PRESSURE: 127 MMHG | BODY MASS INDEX: 20.28 KG/M2 | HEIGHT: 64 IN

## 2021-08-04 DIAGNOSIS — J44.9 CHRONIC OBSTRUCTIVE PULMONARY DISEASE, UNSPECIFIED COPD TYPE (HCC): ICD-10-CM

## 2021-08-04 DIAGNOSIS — M48.062 SPINAL STENOSIS OF LUMBAR REGION WITH NEUROGENIC CLAUDICATION: Primary | ICD-10-CM

## 2021-08-04 DIAGNOSIS — S80.10XS CONTUSION OF LOWER LEG, UNSPECIFIED LATERALITY, SEQUELA: ICD-10-CM

## 2021-08-04 DIAGNOSIS — G20 PARKINSON DISEASE (HCC): ICD-10-CM

## 2021-08-04 DIAGNOSIS — K21.9 GASTROESOPHAGEAL REFLUX DISEASE WITHOUT ESOPHAGITIS: ICD-10-CM

## 2021-08-04 DIAGNOSIS — I10 ESSENTIAL HYPERTENSION: ICD-10-CM

## 2021-08-04 PROCEDURE — G8427 DOCREV CUR MEDS BY ELIG CLIN: HCPCS | Performed by: INTERNAL MEDICINE

## 2021-08-04 PROCEDURE — 1090F PRES/ABSN URINE INCON ASSESS: CPT | Performed by: INTERNAL MEDICINE

## 2021-08-04 PROCEDURE — 1101F PT FALLS ASSESS-DOCD LE1/YR: CPT | Performed by: INTERNAL MEDICINE

## 2021-08-04 PROCEDURE — G8752 SYS BP LESS 140: HCPCS | Performed by: INTERNAL MEDICINE

## 2021-08-04 PROCEDURE — G8754 DIAS BP LESS 90: HCPCS | Performed by: INTERNAL MEDICINE

## 2021-08-04 PROCEDURE — G8536 NO DOC ELDER MAL SCRN: HCPCS | Performed by: INTERNAL MEDICINE

## 2021-08-04 PROCEDURE — G8432 DEP SCR NOT DOC, RNG: HCPCS | Performed by: INTERNAL MEDICINE

## 2021-08-04 PROCEDURE — G8420 CALC BMI NORM PARAMETERS: HCPCS | Performed by: INTERNAL MEDICINE

## 2021-08-04 PROCEDURE — G0463 HOSPITAL OUTPT CLINIC VISIT: HCPCS | Performed by: INTERNAL MEDICINE

## 2021-08-04 PROCEDURE — G8399 PT W/DXA RESULTS DOCUMENT: HCPCS | Performed by: INTERNAL MEDICINE

## 2021-08-04 PROCEDURE — 99215 OFFICE O/P EST HI 40 MIN: CPT | Performed by: INTERNAL MEDICINE

## 2021-08-04 RX ORDER — AZELASTINE HCL 205.5 UG/1
2 SPRAY NASAL 2 TIMES DAILY
Qty: 1 BOTTLE | Refills: 1 | Status: SHIPPED | OUTPATIENT
Start: 2021-08-04 | End: 2022-04-12 | Stop reason: ALTCHOICE

## 2021-08-04 RX ORDER — OMEPRAZOLE 20 MG/1
20 CAPSULE, DELAYED RELEASE ORAL DAILY
Qty: 90 CAPSULE | Refills: 2 | Status: SHIPPED | OUTPATIENT
Start: 2021-08-04

## 2021-08-04 NOTE — PROGRESS NOTES
Mere Castillo (: 1940) is a 68 y.o. female, established patient, here for evaluation of the following chief complaint(s):  Numbness (in L leg) and Follow-up (nasal congestion at night, trouble breathing)       ASSESSMENT/PLAN:  Below is the assessment and plan developed based on review of pertinent history, physical exam, labs, studies, and medications. 1. Spinal stenosis of lumbar region with neurogenic claudication  I advised the pt to consider undergoing injections for her sx. Will continue to monitor for improvements or changes after her apt with Dr. Zachary Begum. Recent MRI in 3/2020 so did not order another one as pain has been constant since then  2. Chronic obstructive pulmonary disease, unspecified COPD type ([address]  Stable and well-managed. Continue with ongoing regimen of Symbicort. 3. Essential hypertension  BP is at goal. I do not recommend any change in treatment. I informed the pt that if her pressure rises, I am going to recommend re-adding a regimen. 4. Parkinson disease ([address]  Stable and well-managed. Continue with ongoing regimen of Requip. 5. Gastroesophageal reflux disease without esophagitis  -     omeprazole (PRILOSEC) 20 mg capsule; Take 1 Capsule by mouth daily. , Normal, Disp-90 Capsule, R-2  Stable and well-managed. Continue with ongoing regimen of Omeprazole. 6. Contusion of lower leg, unspecified laterality, sequela  I advised the pt to consider undergoing injections for her sx. Will continue to monitor for improvements or changes after her apt with Dr. Zachary Begum. Other- Congestion:   I will rx astelin and flonase. I strongly advised that the pt stop using Afrin as I believe that she is currently experiencing rebound effects. If these remedies do not work, I encouraged her to schedule an apt with ENT. No follow-ups on file. SUBJECTIVE/OBJECTIVE:  HPI    Spinal Stenosis: Pt presents today with c/o L leg numbness, burning, and tingling for the past year.  She notes a hx of sciatica prior to quarantine. Pt believes that during the treatment of this condition in 2-2020, a nerve has been pinched. She was also diagnosed with spinal stenosis during this period. It was recommended that she receive injections for this condition, but she denies interest. She reports difficulty with balance and walking. Pt has scheduled an apt with Dr. Flaca Toledo. She denies any progression since 2020. Congestion: Pt is using Afrin to help her sleep when she is congested. She states that this is the only regimen that helps her sleep. Discoloration: Pt expresses concern with her discoloration in her legs. Hypertension ROS: taking medications as instructed, no medication side effects noted, no TIA's, no chest pain on exertion, no dyspnea on exertion, no swelling of ankles. BP in office today is 127/72. She reports marcia readings at other doctors apt's and agrees to monitor at home. Constipation: Pt is taking 2 tablespoons of miralax/day. Review of Systems   HENT: Positive for congestion. Musculoskeletal: Positive for gait problem. Neurological: Positive for numbness. All other systems reviewed and are negative. Physical Exam  Constitutional:       Appearance: Normal appearance. HENT:      Right Ear: Tympanic membrane and external ear normal.      Left Ear: Tympanic membrane and external ear normal.      Mouth/Throat:      Mouth: Mucous membranes are moist.      Pharynx: Oropharynx is clear. Cardiovascular:      Rate and Rhythm: Normal rate and regular rhythm. Pulses: Normal pulses. Heart sounds: Normal heart sounds. Pulmonary:      Effort: Pulmonary effort is normal.      Breath sounds: Normal breath sounds. Musculoskeletal:         General: Normal range of motion. Skin:     General: Skin is warm and dry. Neurological:      General: No focal deficit present. Mental Status: She is alert and oriented to person, place, and time.    Psychiatric:         Mood and Affect: Mood normal.         Behavior: Behavior normal.       On this date 08/04/2021 I have spent 40   minutes reviewing previous notes, test results and face to face with the patient discussing the diagnosis and importance of compliance with the treatment plan as well as documenting on the day of the visit. An electronic signature was used to authenticate this note. Written by Luana Mims as dictated by Dr. Nancy Wilson.    -- Luana Mims

## 2021-09-13 ENCOUNTER — OFFICE VISIT (OUTPATIENT)
Dept: NEUROLOGY | Age: 81
End: 2021-09-13
Payer: MEDICARE

## 2021-09-13 VITALS
BODY MASS INDEX: 20.49 KG/M2 | DIASTOLIC BLOOD PRESSURE: 76 MMHG | WEIGHT: 120 LBS | HEIGHT: 64 IN | SYSTOLIC BLOOD PRESSURE: 128 MMHG | RESPIRATION RATE: 16 BRPM | OXYGEN SATURATION: 91 % | HEART RATE: 80 BPM

## 2021-09-13 DIAGNOSIS — G20 PARKINSON'S DISEASE (HCC): Primary | ICD-10-CM

## 2021-09-13 DIAGNOSIS — M54.2 NECK PAIN: ICD-10-CM

## 2021-09-13 DIAGNOSIS — G60.9 IDIOPATHIC PERIPHERAL NEUROPATHY: ICD-10-CM

## 2021-09-13 DIAGNOSIS — R27.8 SENSORY ATAXIA: ICD-10-CM

## 2021-09-13 PROCEDURE — G8399 PT W/DXA RESULTS DOCUMENT: HCPCS | Performed by: NURSE PRACTITIONER

## 2021-09-13 PROCEDURE — 99214 OFFICE O/P EST MOD 30 MIN: CPT | Performed by: NURSE PRACTITIONER

## 2021-09-13 PROCEDURE — 1101F PT FALLS ASSESS-DOCD LE1/YR: CPT | Performed by: NURSE PRACTITIONER

## 2021-09-13 PROCEDURE — G8754 DIAS BP LESS 90: HCPCS | Performed by: NURSE PRACTITIONER

## 2021-09-13 PROCEDURE — G8432 DEP SCR NOT DOC, RNG: HCPCS | Performed by: NURSE PRACTITIONER

## 2021-09-13 PROCEDURE — G8420 CALC BMI NORM PARAMETERS: HCPCS | Performed by: NURSE PRACTITIONER

## 2021-09-13 PROCEDURE — 1090F PRES/ABSN URINE INCON ASSESS: CPT | Performed by: NURSE PRACTITIONER

## 2021-09-13 PROCEDURE — G8427 DOCREV CUR MEDS BY ELIG CLIN: HCPCS | Performed by: NURSE PRACTITIONER

## 2021-09-13 PROCEDURE — G8752 SYS BP LESS 140: HCPCS | Performed by: NURSE PRACTITIONER

## 2021-09-13 PROCEDURE — G8536 NO DOC ELDER MAL SCRN: HCPCS | Performed by: NURSE PRACTITIONER

## 2021-09-13 RX ORDER — ACETAMINOPHEN AND CODEINE PHOSPHATE 300; 30 MG/1; MG/1
1 TABLET ORAL
Qty: 60 TABLET | Refills: 0 | Status: SHIPPED | OUTPATIENT
Start: 2021-09-13 | End: 2021-10-13

## 2021-09-13 RX ORDER — CARBIDOPA AND LEVODOPA 25; 100 MG/1; MG/1
TABLET ORAL
Qty: 360 TABLET | Refills: 3 | Status: SHIPPED | OUTPATIENT
Start: 2021-09-13 | End: 2022-06-08 | Stop reason: SDUPTHER

## 2021-09-13 RX ORDER — FUROSEMIDE 20 MG/1
TABLET ORAL
COMMUNITY
Start: 2021-08-28 | End: 2022-04-12 | Stop reason: ALTCHOICE

## 2021-09-13 NOTE — PROGRESS NOTES
[facility] and  [address] at [facility] at [address] [place] [address], [id number] [place] [address] [address]  [phone] Office  [phone] Facsimile           Date:  21     Name:  Alexandra Andujar  :  1940  MRN:  [id number]     PCP:  Armin Carrizales MD    Chief Complaint   Patient presents with    Follow-up     Parkinson's      HISTORY OF PRESENT ILLNESS: Follow-up of Parkinson's disease and peripheral neuropathy. At her last office visit, she was having complaints of feeling like she was having some tremor which seemed to originate from the inside and is not something that is externally noticeable. She was started on Requip XL which she indicates really did not help. PN is bothersome especially in the evening. States that this feels like the nerves are being grated on. She has spinal stenosis and increased loss of sensation in the left leg. She is going to see a neurosurgeon about this next week. Otherwise, she continues to do pretty well on her present dose of Sinemet. Her balance and tremors are significantly better as she is not noticing the tremor at all. The balance issues seem more related to the neuropathy. No significant stiffness or rigidity outside of what would associated with her arthritic issues. No freezing or wearing off. No falls, voice quality or swallowing issues. She has not had any sudden, erratic movements. Denies issues with memory loss, depression/anxiety, difficulty sleeping, issues with talking in sleep or acting out dreams, hallucinations/delusions. No constipation or nausea. no lightheadedness or positional dizziness. No compulsive behaviors/urges. She is independent with ADLs. Recap from LOV:  Parkinson's disease which by exam looks pretty stable. She did have some complaints of which she calls internal tremors.   There was no significant tremors noted externally. For further treatment, she was started on Requip XL 2 mg nightly. She will continue taking the Sinemet with symptom 4 times daily. Peripheral neuropathy is pretty stable. It is still bothersome she does have some sensory ataxia as a result of this as well. We did discuss potential physical therapy but she has done this in the past and does not feel that it is really that beneficial.  Follow-up in 3 months or sooner if needed. Current Outpatient Medications   Medication Sig    furosemide (LASIX) 20 mg tablet TAKE 1 TABLET BY MOUTH DAILY AS NEEDED    omeprazole (PRILOSEC) 20 mg capsule Take 1 Capsule by mouth daily.  azelastine (ASTEPRO) 205.5 mcg (0.15 %) 2 Sprays by Both Nostrils route two (2) times a day.  azithromycin (ZITHROMAX) 250 mg tablet TAKE 1 TABLET BY MOUTH THREE TIMES A WEEK    carbidopa-levodopa (Sinemet)  mg per tablet 1 po at 7am, 11 am, 3pm and 7pm    ascorbic acid, vitamin C, (VITAMIN C) 500 mg tablet Take  by mouth.  turmeric 400 mg cap Take  by mouth.  albuterol (PROVENTIL VENTOLIN) 2.5 mg /3 mL (0.083 %) nebulizer solution by Nebulization route once.  rosuvastatin (CRESTOR) 20 mg tablet Take 20 mg by mouth nightly.  budesonide-formoterol (SYMBICORT) 160-4.5 mcg/actuation HFA inhaler Take 2 Puffs by inhalation two (2) times a day.  magnesium 250 mg tab Take 500 mg by mouth daily. Indications: patient is only taking 250mg daily    aclidinium bromide (TUDORZA PRESSAIR) 400 mcg/actuation inhaler Take 1 Puff by inhalation daily.  cholecalciferol (VITAMIN D3) 1,000 unit tablet Take 1,000 Units by mouth daily.  albuterol (PROVENTIL HFA, VENTOLIN HFA) 90 mcg/actuation inhaler Take 2 Puffs by inhalation every four (4) hours as needed for Wheezing.  ASPIRIN 81 mg chewable tablet Take 81 mg by mouth every other day. No current facility-administered medications for this visit.      Allergies   Allergen Reactions    Augmentin [Amoxicillin-Pot Clavulanate] Nausea Only     Past Medical History:   Diagnosis Date    Asthma 10/11/2012    CAD (coronary artery disease) 4/3/2018    Cancer ([address]     bladder cancer    Chronic obstructive pulmonary disease (HCC)     GERD (gastroesophageal reflux disease) 2009    HTN 2009    Menopausal state 2009    Serum lipid, elevated 2009     Past Surgical History:   Procedure Laterality Date    COLONOSCOPY N/A 2016    COLONOSCOPY performed by Destiny Lopez MD at [facility] HX CHOLECYSTECTOMY      HX HEENT      wisdom teeth extraction    HX TONSILLECTOMY      HX UROLOGICAL      TURBT for bladder cancer; recurrent cystoscopies    ME CABG, ARTERY-VEIN, THREE  2016    Cabg x 3     Social History     Socioeconomic History    Marital status:      Spouse name: Not on file    Number of children: Not on file    Years of education: Not on file    Highest education level: Not on file   Occupational History    Not on file   Tobacco Use    Smoking status: Former Smoker     Packs/day: 1.00     Years: 20.00     Pack years: 20.00     Types: Cigarettes     Quit date: 1980     Years since quittin.7    Smokeless tobacco: Never Used   Substance and Sexual Activity    Alcohol use: Yes     Alcohol/week: 1.0 standard drinks     Types: 1 Glasses of wine per week     Comment: daily glass of wine    Drug use: No    Sexual activity: Never   Other Topics Concern    Not on file   Social History Narrative    Not on file     Social Determinants of Health     Financial Resource Strain:     Difficulty of Paying Living Expenses:    Food Insecurity:     Worried About Running Out of Food in the Last Year:     [address] in the Last Year:    Transportation Needs:     Lack of Transportation (Medical):      Lack of Transportation (Non-Medical):    Physical Activity:     Days of Exercise per Week:     Minutes of Exercise per Session:    Stress:  Feeling of Stress :    Social Connections:     Frequency of Communication with Friends and Family:     Frequency of Social Gatherings with Friends and Family:     Attends [employer] Services:     Active Member of Clubs or Organizations:     Attends Club or Organization Meetings:     Marital Status:    Intimate Partner Violence:     Fear of Current or Ex-Partner:     Emotionally Abused:     Physically Abused:     Sexually Abused:      Family History   Problem Relation Age of Onset    Heart Attack Mother     Hypertension Mother     High Cholesterol Mother     Hypertension Father     Hypertension Brother        PHYSICAL EXAMINATION:    Visit Vitals  /76 (BP 1 Location: Left upper arm, BP Patient Position: Sitting)   Pulse 80   Resp 16   Ht 5' 4\" (1.626 m)   Wt 54.4 kg (120 lb)   SpO2 91%   BMI 20.60 kg/m²     General:  Well defined, nourished, and groomed individual in no acute distress. Neck: Supple, nontender, no bruits, no pain with resistance to active range of motion. Heart: Regular rate and rhythm, no murmurs, rub, or gallop. Normal S1S2. Lungs:  Clear to auscultation bilaterally with equal chest expansion, no cough, no wheeze  Musculoskeletal:  Extremities revealed no edema and had full range of motion of joints. Psych:  Good mood and bright affect    NEUROLOGICAL EXAMINATION:     Mental Status:   Alert and oriented to person, place, and time with recent and remote memory intact. Attention span and concentration are normal. Speech is fluent with a full fund of knowledge. Cranial Nerves:    II, III, IV, VI:  Visual acuity grossly intact. Visual fields are normal.    Pupils are equal, round, and reactive to light and accommodation. Extra-ocular movements are full and fluid. Fundoscopic exam was benign, no ptosis or nystagmus. V-XII: Hearing is grossly intact. Facial features are symmetric, with normal sensation and strength.   The palate rises symmetrically and the tongue protrudes midline. Sternocleidomastoids 5/5. Motor Examination: Normal tone, bulk, and strength, 5/5 muscle strength throughout. No cogwheel rigidity     Sensory Examination: Stocking glove sensory loss. This is more prominent in the left lateral calf. Coordination: Finger to nose and rapid arm movement testing with mild bradykinesia and left sided dysmetria. No significant resting tremor. Bilateral minimal intention tremor    Gait and Station:  Steady gait with a decreased bilateral arm swing and shuffling. Evidence of sensory ataxia based on foot fall. She does have some mild stiffness. No en bloc turning. No pronator drift. No muscle wasting or fasiculations noted. Reflexes:  DTRs 2+ throughout. ASSESSMENT AND PLAN    ICD-10-CM ICD-9-CM    1. Parkinson's disease ([facility]  G20 332.0 carbidopa-levodopa (Sinemet)  mg per tablet   2. Neck pain  M54.2 723.1 acetaminophen-codeine (TYLENOL #3) 300-30 mg per tablet   3. Idiopathic peripheral neuropathy  G60.9 356.9    4. Sensory ataxia  R27.8 781. 3      Parkinson disease is stable on present therapy of Sinemet. She does have increased complaints of balance issues but this actually seem more related to sensory ataxia and she is not a proponent of physical therapy citing that she does not think it helps. We discussed that she did have a normal B12 and TSH as well as a normal EMG in 2019 but that this does not rule out a small fiber peripheral neuropathy. Unfortunately, there does not appear to be an underlying cause outside of potential radiculopathy related to her spinal stenosis though this was not reflected on the EMG either.  Follow up in six months    Nubia Chen NP

## 2021-12-17 LAB — SARS-COV-2: NEGATIVE

## 2022-03-18 PROBLEM — J96.01 ACUTE RESPIRATORY FAILURE WITH HYPOXIA (HCC): Status: ACTIVE | Noted: 2018-04-03

## 2022-03-19 PROBLEM — Z95.1 S/P CABG (CORONARY ARTERY BYPASS GRAFT): Status: ACTIVE | Noted: 2018-04-03

## 2022-03-19 PROBLEM — Z85.51 HISTORY OF BLADDER CANCER: Status: ACTIVE | Noted: 2018-08-01

## 2022-03-19 PROBLEM — R00.0 SINUS TACHYCARDIA: Status: ACTIVE | Noted: 2018-04-03

## 2022-03-20 PROBLEM — I25.10 CAD (CORONARY ARTERY DISEASE): Status: ACTIVE | Noted: 2018-04-03

## 2022-04-11 NOTE — PROGRESS NOTES
Keren Scherer (: 1940) is a 80 y.o. female, established patient, here for evaluation of the following chief complaint(s):  Follow-up (bp check, numbness in the legs)       ASSESSMENT/PLAN:  Below is the assessment and plan developed based on review of pertinent history, physical exam, labs, studies, and medications. 1. Sensory ataxia  I informed her that there is no treatment for her condition and recommended that she undergo PT to increase strength. 2. Chronic obstructive pulmonary disease, unspecified COPD type ([facility]  Followed by pulmonology. Continue with ongoing regimen of duo neb PRN, albuterol PRN, and Symbicort. 3. Parkinson's disease ([facility]  Followed by neurology. Continue with ongoing regimen of Sinemet. 4. Essential hypertension  -     METABOLIC PANEL, COMPREHENSIVE; Future  -     LIPID PANEL; Future  I am comfortable with her BP where it is. 5. Spinal stenosis of lumbar region with neurogenic claudication  Followed by Dr. Donnie Dickson. I informed her that there is no treatment for her condition and recommended that she undergo PT to increase strength. 6. Gastroesophageal reflux disease without esophagitis  -     CBC W/O DIFF; Future  Stable and well-managed. Continue with ongoing regimen of Prilosec. SUBJECTIVE/OBJECTIVE:  HPI     COPD: Pt states that her breathing is below goal. She only uses Nebs when she is sick. Pulmonary hypertension: Pt sleeps with oxygen at the recommendation of her pulmonologist, but doesn't experience relief from oxygen when exerting herself. She was rx Digoxin, which didn't improve her sx. Pt has a f/u with Jakob Cee @ [facility] on 22. Neuropathy: Pt expresses concern about feeling woozy when she stands, stiffness, and minimal feeling in her feet. Her most recent MRI (with Dr. Donnie Dickson) showed spinal stenosis, which was worse on the R side than the left. She reports persistent numbness in her L leg. [place] in .      GERD: Pt states that her sx are well-managed if she takes her regimen prior to eating spicy foods. Hypertension ROS: no TIA's, no chest pain on exertion, no dyspnea on exertion, no swelling of ankles. BP in office today is 153/75. She notes readings from 126-145/60-90 at home. Review of Systems   HENT: Positive for congestion. Musculoskeletal: Positive for arthralgias. Neurological: Positive for numbness. All other systems reviewed and are negative. Physical Exam  Constitutional:       Appearance: Normal appearance. HENT:      Right Ear: Tympanic membrane and external ear normal.      Left Ear: Tympanic membrane and external ear normal.      Mouth/Throat:      Mouth: Mucous membranes are moist.      Pharynx: Oropharynx is clear. Cardiovascular:      Rate and Rhythm: Normal rate and regular rhythm. Pulses: Normal pulses. Heart sounds: Normal heart sounds. Pulmonary:      Effort: Pulmonary effort is normal.      Breath sounds: Normal breath sounds. Musculoskeletal:         General: Normal range of motion. Skin:     General: Skin is warm and dry. Neurological:      General: No focal deficit present. Mental Status: She is alert and oriented to person, place, and time. Psychiatric:         Mood and Affect: Mood normal.         Behavior: Behavior normal.       On this date 04/12/2022 I have spent 30 minutes reviewing previous notes, test results and face to face with the patient discussing the diagnosis and importance of compliance with the treatment plan as well as documenting on the day of the visit. An electronic signature was used to authenticate this note. Written by Feliciana Harada as dictated by Dr. Tony Boles.    -- Feliciana Harada

## 2022-04-12 ENCOUNTER — OFFICE VISIT (OUTPATIENT)
Dept: INTERNAL MEDICINE CLINIC | Age: 82
End: 2022-04-12
Payer: MEDICARE

## 2022-04-12 VITALS
TEMPERATURE: 97.5 F | HEIGHT: 64 IN | BODY MASS INDEX: 19.87 KG/M2 | OXYGEN SATURATION: 93 % | WEIGHT: 116.4 LBS | RESPIRATION RATE: 16 BRPM | HEART RATE: 72 BPM | DIASTOLIC BLOOD PRESSURE: 75 MMHG | SYSTOLIC BLOOD PRESSURE: 153 MMHG

## 2022-04-12 DIAGNOSIS — G20 PARKINSON'S DISEASE (HCC): ICD-10-CM

## 2022-04-12 DIAGNOSIS — I10 ESSENTIAL HYPERTENSION: ICD-10-CM

## 2022-04-12 DIAGNOSIS — Z00.00 MEDICARE ANNUAL WELLNESS VISIT, SUBSEQUENT: Primary | ICD-10-CM

## 2022-04-12 DIAGNOSIS — K21.9 GASTROESOPHAGEAL REFLUX DISEASE WITHOUT ESOPHAGITIS: ICD-10-CM

## 2022-04-12 DIAGNOSIS — J44.9 CHRONIC OBSTRUCTIVE PULMONARY DISEASE, UNSPECIFIED COPD TYPE (HCC): ICD-10-CM

## 2022-04-12 DIAGNOSIS — M48.062 SPINAL STENOSIS OF LUMBAR REGION WITH NEUROGENIC CLAUDICATION: ICD-10-CM

## 2022-04-12 DIAGNOSIS — R27.8 SENSORY ATAXIA: ICD-10-CM

## 2022-04-12 PROCEDURE — G8510 SCR DEP NEG, NO PLAN REQD: HCPCS | Performed by: INTERNAL MEDICINE

## 2022-04-12 PROCEDURE — 99214 OFFICE O/P EST MOD 30 MIN: CPT | Performed by: INTERNAL MEDICINE

## 2022-04-12 PROCEDURE — 1101F PT FALLS ASSESS-DOCD LE1/YR: CPT | Performed by: INTERNAL MEDICINE

## 2022-04-12 PROCEDURE — G8427 DOCREV CUR MEDS BY ELIG CLIN: HCPCS | Performed by: INTERNAL MEDICINE

## 2022-04-12 PROCEDURE — G8754 DIAS BP LESS 90: HCPCS | Performed by: INTERNAL MEDICINE

## 2022-04-12 PROCEDURE — G8420 CALC BMI NORM PARAMETERS: HCPCS | Performed by: INTERNAL MEDICINE

## 2022-04-12 PROCEDURE — G0463 HOSPITAL OUTPT CLINIC VISIT: HCPCS | Performed by: INTERNAL MEDICINE

## 2022-04-12 PROCEDURE — G8753 SYS BP > OR = 140: HCPCS | Performed by: INTERNAL MEDICINE

## 2022-04-12 PROCEDURE — G0439 PPPS, SUBSEQ VISIT: HCPCS | Performed by: INTERNAL MEDICINE

## 2022-04-12 PROCEDURE — G8536 NO DOC ELDER MAL SCRN: HCPCS | Performed by: INTERNAL MEDICINE

## 2022-04-12 PROCEDURE — G8399 PT W/DXA RESULTS DOCUMENT: HCPCS | Performed by: INTERNAL MEDICINE

## 2022-04-12 PROCEDURE — 1090F PRES/ABSN URINE INCON ASSESS: CPT | Performed by: INTERNAL MEDICINE

## 2022-04-12 NOTE — PATIENT INSTRUCTIONS

## 2022-04-12 NOTE — PROGRESS NOTES
This is the Subsequent Medicare Annual Wellness Exam, performed 12 months or more after the Initial AWV or the last Subsequent AWV    I have reviewed the patient's medical history in detail and updated the computerized patient record. Assessment/Plan   Education and counseling provided:  Are appropriate based on today's review and evaluation    1. Sensory ataxia  2. Chronic obstructive pulmonary disease, unspecified COPD type ([facility]  3. Parkinson's disease ([facility]  4. Essential hypertension  5. Spinal stenosis of lumbar region with neurogenic claudication  6. Gastroesophageal reflux disease without esophagitis       Depression Risk Factor Screening     3 most recent PHQ Screens 4/12/2022   Little interest or pleasure in doing things Not at all   Feeling down, depressed, irritable, or hopeless Not at all   Total Score PHQ 2 0       Alcohol & Drug Abuse Risk Screen    Do you average more than 1 drink per night or more than 7 drinks a week:  No    On any one occasion in the past three months have you have had more than 3 drinks containing alcohol:  No          Functional Ability and Level of Safety    Hearing: Hearing is good. Activities of Daily Living: The home contains: handrails and grab bars  Patient does total self care      Ambulation: with difficulty, uses a cane and walker     Fall Risk:  Fall Risk Assessment, last 12 mths 4/12/2022   Able to walk? Yes   Fall in past 12 months? 0   Do you feel unsteady? 0   Are you worried about falling 0   Number of falls in past 12 months -   Fall with injury?  -      Abuse Screen:  Patient is not abused       Cognitive Screening    Has your family/caregiver stated any concerns about your memory: no     Cognitive Screening: Normal - MMSE (Mini Mental Status Exam)    Health Maintenance Due     Health Maintenance Due   Topic Date Due    Hepatitis C Screening  Never done    Shingrix Vaccine Age 50> (1 of 2) Never done    Medicare Yearly Exam  05/01/2022 Patient Care Team   Patient Care Team:  Luis Fernando Cameron MD as PCP - Gerardo Jones MD as PCP - [facility] Empaneled Provider  Perlita Ndiaye MD (Pulmonary Disease)  Pita Olivas MD as Consulting Provider (Pulmonary Disease)    History     Patient Active Problem List   Diagnosis Code    HTN (hypertension) I10    Serum lipids high E78.5    GERD (gastroesophageal reflux disease) K21.9    Menopausal state N95.1    Asthma J45.909    ACP (advance care planning) Z71.89    Acute respiratory failure with hypoxia ([address] J96.01    Sinus tachycardia R00.0    CAD (coronary artery disease) I25.10    S/P CABG (coronary artery bypass graft) Z95.1    History of bladder cancer Z85.51     Past Medical History:   Diagnosis Date    Asthma 10/11/2012    CAD (coronary artery disease) 4/3/2018    Cancer ([facility]     bladder cancer    Chronic obstructive pulmonary disease ([facility]     GERD (gastroesophageal reflux disease) 4/1/2009    HTN 4/1/2009    Menopausal state 4/1/2009    Serum lipid, elevated 4/1/2009      Past Surgical History:   Procedure Laterality Date    COLONOSCOPY N/A 8/31/2016    COL[facility]COPY performed by Azul Johnson MD at [facility] HX CHOLECYSTECTOMY  2012    HX HEENT      wisdom teeth extraction    HX TONSILLECTOMY      HX UROLOGICAL      TURBT for bladder cancer; recurrent cystoscopies    [place] CABG, ARTERY-VEIN, THREE  03/22/2016    Cabg x 3     Current Outpatient Medications   Medication Sig Dispense Refill    carbidopa-levodopa (Sinemet)  mg per tablet 1 po at 7am, 11 am, 3pm and 7pm 360 Tablet 3    omeprazole (PRILOSEC) 20 mg capsule Take 1 Capsule by mouth daily. 90 Capsule 2    azithromycin (ZITHROMAX) 250 mg tablet TAKE 1 TABLET BY MOUTH THREE TIMES A WEEK      ascorbic acid, vitamin C, (VITAMIN C) 500 mg tablet Take  by mouth.  turmeric 400 mg cap Take  by mouth.       albuterol (PROVENTIL VENTOLIN) 2.5 mg /3 mL (0.083 %) nebulizer solution by Nebulization route once.  rosuvastatin (CRESTOR) 20 mg tablet Take 20 mg by mouth nightly.  budesonide-formoterol (SYMBICORT) 160-4.5 mcg/actuation HFA inhaler Take 2 Puffs by inhalation two (2) times a day. 3 Inhaler 1    magnesium 250 mg tab Take 500 mg by mouth daily. Indications: patient is only taking 250mg daily      aclidinium bromide (TUDORZA PRESSAIR) 400 mcg/actuation inhaler Take 1 Puff by inhalation daily.  cholecalciferol (VITAMIN D3) 1,000 unit tablet Take 1,000 Units by mouth daily.  albuterol (PROVENTIL HFA, VENTOLIN HFA) 90 mcg/actuation inhaler Take 2 Puffs by inhalation every four (4) hours as needed for Wheezing. 1 Inhaler 2    ASPIRIN 81 mg chewable tablet Take 81 mg by mouth every other day.  furosemide (LASIX) 20 mg tablet TAKE 1 TABLET BY MOUTH DAILY AS NEEDED      azelastine (ASTEPRO) 205.5 mcg (0.15 %) 2 Sprays by Both Nostrils route two (2) times a day. 1 Bottle 1     Allergies   Allergen Reactions    Augmentin [Amoxicillin-Pot Clavulanate] Nausea Only       Family History   Problem Relation Age of Onset    Heart Attack Mother     Hypertension Mother     High Cholesterol Mother     Hypertension Father     Hypertension Brother      Social History     Tobacco Use    Smoking status: Former Smoker     Packs/day: 1.00     Years: 20.00     Pack years: 20.00     Types: Cigarettes     Quit date: 1980     Years since quittin.3    Smokeless tobacco: Never Used   Substance Use Topics    Alcohol use:  Yes     Alcohol/week: 1.0 standard drink     Types: 1 Glasses of wine per week     Comment: daily glass of wine         Concha Burton MD

## 2022-04-13 LAB
ALBUMIN SERPL-MCNC: 4.1 G/DL (ref 3.5–5)
ALBUMIN/GLOB SERPL: 1.6 {RATIO} (ref 1.1–2.2)
ALP SERPL-CCNC: 90 U/L (ref 45–117)
ALT SERPL-CCNC: 26 U/L (ref 12–78)
ANION GAP SERPL CALC-SCNC: 5 MMOL/L (ref 5–15)
AST SERPL-CCNC: 23 U/L (ref 15–37)
BILIRUB SERPL-MCNC: 1.4 MG/DL (ref 0.2–1)
BUN SERPL-MCNC: 26 MG/DL (ref 6–20)
BUN/CREAT SERPL: 45 (ref 12–20)
CALCIUM SERPL-MCNC: 9.5 MG/DL (ref 8.5–10.1)
CHLORIDE SERPL-SCNC: 102 MMOL/L (ref 97–108)
CHOLEST SERPL-MCNC: 154 MG/DL
CO2 SERPL-SCNC: 31 MMOL/L (ref 21–32)
CREAT SERPL-MCNC: 0.58 MG/DL (ref 0.55–1.02)
ERYTHROCYTE [DISTWIDTH] IN BLOOD BY AUTOMATED COUNT: 13.6 % (ref 11.5–14.5)
GLOBULIN SER CALC-MCNC: 2.6 G/DL (ref 2–4)
GLUCOSE SERPL-MCNC: 87 MG/DL (ref 65–100)
HCT VFR BLD AUTO: 48.7 % (ref 35–47)
HDLC SERPL-MCNC: 77 MG/DL
HDLC SERPL: 2 {RATIO} (ref 0–5)
HGB BLD-MCNC: 15.2 G/DL (ref 11.5–16)
LDLC SERPL CALC-MCNC: 55.4 MG/DL (ref 0–100)
MCH RBC QN AUTO: 32 PG (ref 26–34)
MCHC RBC AUTO-ENTMCNC: 31.2 G/DL (ref 30–36.5)
MCV RBC AUTO: 102.5 FL (ref 80–99)
NRBC # BLD: 0 K/UL (ref 0–0.01)
NRBC BLD-RTO: 0 PER 100 WBC
PLATELET # BLD AUTO: 236 K/UL (ref 150–400)
PMV BLD AUTO: 12.1 FL (ref 8.9–12.9)
POTASSIUM SERPL-SCNC: 5 MMOL/L (ref 3.5–5.1)
PROT SERPL-MCNC: 6.7 G/DL (ref 6.4–8.2)
RBC # BLD AUTO: 4.75 M/UL (ref 3.8–5.2)
SODIUM SERPL-SCNC: 138 MMOL/L (ref 136–145)
TRIGL SERPL-MCNC: 108 MG/DL (ref ?–150)
VLDLC SERPL CALC-MCNC: 21.6 MG/DL
WBC # BLD AUTO: 8.3 K/UL (ref 3.6–11)

## 2022-06-08 ENCOUNTER — OFFICE VISIT (OUTPATIENT)
Dept: NEUROLOGY | Age: 82
End: 2022-06-08
Payer: MEDICARE

## 2022-06-08 VITALS
HEART RATE: 73 BPM | RESPIRATION RATE: 18 BRPM | OXYGEN SATURATION: 93 % | TEMPERATURE: 98.4 F | SYSTOLIC BLOOD PRESSURE: 140 MMHG | BODY MASS INDEX: 19.76 KG/M2 | WEIGHT: 115.1 LBS | DIASTOLIC BLOOD PRESSURE: 69 MMHG

## 2022-06-08 DIAGNOSIS — G20 PARKINSON'S DISEASE (HCC): Primary | ICD-10-CM

## 2022-06-08 DIAGNOSIS — R20.8 DYSESTHESIA: ICD-10-CM

## 2022-06-08 PROCEDURE — G8399 PT W/DXA RESULTS DOCUMENT: HCPCS | Performed by: PSYCHIATRY & NEUROLOGY

## 2022-06-08 PROCEDURE — 1101F PT FALLS ASSESS-DOCD LE1/YR: CPT | Performed by: PSYCHIATRY & NEUROLOGY

## 2022-06-08 PROCEDURE — 1090F PRES/ABSN URINE INCON ASSESS: CPT | Performed by: PSYCHIATRY & NEUROLOGY

## 2022-06-08 PROCEDURE — G8754 DIAS BP LESS 90: HCPCS | Performed by: PSYCHIATRY & NEUROLOGY

## 2022-06-08 PROCEDURE — G8510 SCR DEP NEG, NO PLAN REQD: HCPCS | Performed by: PSYCHIATRY & NEUROLOGY

## 2022-06-08 PROCEDURE — G8427 DOCREV CUR MEDS BY ELIG CLIN: HCPCS | Performed by: PSYCHIATRY & NEUROLOGY

## 2022-06-08 PROCEDURE — 1123F ACP DISCUSS/DSCN MKR DOCD: CPT | Performed by: PSYCHIATRY & NEUROLOGY

## 2022-06-08 PROCEDURE — G8753 SYS BP > OR = 140: HCPCS | Performed by: PSYCHIATRY & NEUROLOGY

## 2022-06-08 PROCEDURE — G8420 CALC BMI NORM PARAMETERS: HCPCS | Performed by: PSYCHIATRY & NEUROLOGY

## 2022-06-08 PROCEDURE — 99214 OFFICE O/P EST MOD 30 MIN: CPT | Performed by: PSYCHIATRY & NEUROLOGY

## 2022-06-08 PROCEDURE — G8536 NO DOC ELDER MAL SCRN: HCPCS | Performed by: PSYCHIATRY & NEUROLOGY

## 2022-06-08 RX ORDER — CARBIDOPA AND LEVODOPA 25; 100 MG/1; MG/1
TABLET ORAL
Qty: 360 TABLET | Refills: 3 | Status: SHIPPED | OUTPATIENT
Start: 2022-06-08

## 2022-06-08 NOTE — PROGRESS NOTES
[address] [facility] and [address] at [facility] at [address] [facility], [address] [place] [address] [address] [facility], [address] [place] [address]   [phone]              Chief Complaint   Patient presents with    Parkinsons Disease     does not notice difference with or w/o sinemet     Current Outpatient Medications   Medication Sig Dispense Refill    carbidopa-levodopa (Sinemet)  mg per tablet 1 po at 7am, 11 am, 3pm and 7pm 360 Tablet 3    omeprazole (PRILOSEC) 20 mg capsule Take 1 Capsule by mouth daily. 90 Capsule 2    azithromycin (ZITHROMAX) 250 mg tablet TAKE 1 TABLET BY MOUTH THREE TIMES A WEEK      ascorbic acid, vitamin C, (VITAMIN C) 500 mg tablet Take  by mouth.  turmeric 400 mg cap Take  by mouth.  albuterol (PROVENTIL VENTOLIN) 2.5 mg /3 mL (0.083 %) nebulizer solution by Nebulization route once.  rosuvastatin (CRESTOR) 20 mg tablet Take 20 mg by mouth nightly.  budesonide-formoterol (SYMBICORT) 160-4.5 mcg/actuation HFA inhaler Take 2 Puffs by inhalation two (2) times a day. 3 Inhaler 1    magnesium 250 mg tab Take 500 mg by mouth daily. Indications: patient is only taking 250mg daily      aclidinium bromide (TUDORZA PRESSAIR) 400 mcg/actuation inhaler Take 1 Puff by inhalation daily.  cholecalciferol (VITAMIN D3) 1,000 unit tablet Take 1,000 Units by mouth daily.  albuterol (PROVENTIL HFA, VENTOLIN HFA) 90 mcg/actuation inhaler Take 2 Puffs by inhalation every four (4) hours as needed for Wheezing. 1 Inhaler 2    ASPIRIN 81 mg chewable tablet Take 81 mg by mouth every other day.         Allergies   Allergen Reactions    Augmentin [Amoxicillin-Pot Clavulanate] Nausea Only     Social History     Tobacco Use    Smoking status: Former Smoker     Packs/day: 1.00     Years: 20.00     Pack years: 20.00     Types: Cigarettes     Quit date: 1/1/1980     Years since quitting: 42.4    Smokeless tobacco: Never Used   Substance Use Topics    Alcohol use: Yes     Alcohol/week: 1.0 standard drink     Types: 1 Glasses of wine per week     Comment: daily glass of wine    Drug use: No     31-year-old lady who returns today for follow-up. Was last seen by nurse practitioner Kj Aldrich in September 2021 for Parkinson's. She was maintained on Sinemet. She has dysesthesias in her feet. She had an EMG performed by Dr. Lulu Morocho 11/5/2019 which did not show any evidence of a large fiber neuropathy. She has not had any tremor. Continues to have dysesthesias. Has imbalance. Has several good questions today which she has on her phone and we answered those. We discussed the rationale for Sinemet. Examination  Visit Vitals  BP (!) 140/69 (BP 1 Location: Left upper arm, BP Patient Position: Sitting, BP Cuff Size: Adult)   Pulse 73   Temp 98.4 °F (36.9 °C)   Resp 18   Wt 52.2 kg (115 lb 1.6 oz)   SpO2 93%   BMI 19.76 kg/m²     Awake alert and oriented. Normal speech and normal language. Her cognition is normal.  She has no tremor today. Minimal cogwheeling at the wrist bilaterally. Uses a walking stick    Impression/Plan  Parkinson's stable  Continue Sinemet    Dysesthesias  Discussed that she does not have a large fiber neuropathy but certainly could have small fiber but not much that we can do about it and that there is no treatment/cure etc.  Continue to stay active  Follow-up in 6 months    Yoanna Alex MD        This note was created using voice recognition software. Despite editing, there may be syntax errors.

## 2022-10-09 NOTE — PROGRESS NOTES
Jorge Hernandez (: 1940) is a 80 y.o. female, established patient, here for evaluation of the following chief complaint(s):  Follow-up (bloodwork)       ASSESSMENT/PLAN:  Below is the assessment and plan developed based on review of pertinent history, physical exam, labs, studies, and medications. 1. Chronic obstructive pulmonary disease, unspecified COPD type (HCC)-continue with Steve Santana she is seeing Dr. Naresh Kerns pulmonary and this was to reevaluate after the valve to see if it has benefited her. Apparently there are other treatment options she can try  -     CBC WITH AUTOMATED DIFF; Future  2. Parkinson's disease (HCC)-she is having some mobility issues and weakness in her left leg but it feels as if her Sinemet is stable at the dosing that she is taking  3. Essential hypertension-currently her blood pressures been at goal and does not require medications  -     METABOLIC PANEL, COMPREHENSIVE; Future  -     LIPID PANEL; Future  -     URINALYSIS W/ REFLEX CULTURE; Future  4. Gastroesophageal reflux disease without esophagitis-only takes Prilosec when she is eating pizza or having spaghetti  -     CBC WITH AUTOMATED DIFF; Future  5. Sensory ataxia-seems almost multifactorial combination of both the Parkinson's combined with some stenosis of the spine and some possible small fiber neuropathy as she does complain of some numbness in her left leg. I think the combination of all 3 affects her balance and her walking but none of them can be fixed with any medicines or she is not a surgical candidate for her back. So using a walker or cane is really going to be the best mode of treatment as well as maximizing her exercise which she already does  -     TSH 3RD GENERATION; Future    No follow-ups on file. Symbicort, turdoza; she does valve helped some but not helped as much as she thought   Sinemet; goes on treadmill and does strength training   ?  Meds  Prilosec- when eats pizza and spaghetti   Use a cane and walker could get second opionion from another neurology       SUBJECTIVE/OBJECTIVE:  HPI  Mary Kelsey is a 80 y.o. female with PMH PH, COPD, severe emphysema s/p EBV RUL, RML 6/27/22, chronic hypoxic respiratory failure requiring 2L NC with exertion presenting today for follow up of EBV. She does not believe the valves have been effective; notes little to no difference in her exercise capacity or symptoms. Per Isela Brunner: Her radiographs continue to show persistent volume loss on R which have been stable since valve placements. She was told it can take up to 6 months to notice full improvement. She currently does not notice a major difference, but her  notices that her breathing is better. She feels that she can now 'breathe deeper'. But will still notice SOB when her legs swell. Was previously sent to pulmonary rehabilitation before in [facility], but she has not gone back since the place closed during the pandemic. Has tried remote pulm rehab in the past through [place] and is actively doing this. She used to go to the gym three times a week but now goes on walks. Would like to go back to the gym and build back up her muscle strength. She asks about treatment of her PH and specifically about Tyvaso DPI. - prostacyclin analog but it was decided not to do this and try to do more pulm rehab    She does still get SOB and has a follow up in Feb    Sensory ataxia- she saw  and was felt there was not a lot more to helps this; her legs and feet do not work and she is frustrated by this ; the left leg does not want to follow the right one and wonders if that is difference    Subjective:   Bibiana SALAZAR Claudeen Clarks is a 80 y.o. female with hypertension. Hypertension ROS: taking medications as instructed, no medication side effects noted, no TIA's, no chest pain on exertion, no dyspnea on exertion, no swelling of ankles. New concerns: 128/81.       PD_ cont with sinemet and tolerating medicine but has some left sided weakness still; she does get constipated and takes stool softener and get miralax every day     Subjective:   Alejandro Merrill is a 80 y.o. female with hyperlipidemia. Cardiovascular risk analysis - 80 y.o. female LDL goal is under 100. ROS: taking medications as instructed, no medication side effects noted, no TIA's, no chest pain on exertion, no dyspnea on exertion, no swelling of ankles. Tolerating meds, no myalgias or other side effects noted  New concerns: tolerating medicine . Lab Results   Component Value Date/Time    Cholesterol, total 154 04/12/2022 10:14 AM    HDL Cholesterol 77 04/12/2022 10:14 AM    LDL, calculated 55.4 04/12/2022 10:14 AM    VLDL, calculated 21.6 04/12/2022 10:14 AM    Triglyceride 108 04/12/2022 10:14 AM    CHOL/HDL Ratio 2.0 04/12/2022 10:14 AM      Gerd- she cont to take the prilosec    Review of Systems   All other systems reviewed and are negative. Physical Exam  Vitals and nursing note reviewed. Constitutional:       Appearance: She is well-developed. HENT:      Head: Normocephalic and atraumatic. Right Ear: External ear normal.      Left Ear: External ear normal.      Nose: Nose normal.   Eyes:      Conjunctiva/sclera: Conjunctivae normal.   Neck:      Thyroid: No thyroid mass or thyromegaly. Vascular: No carotid bruit. Cardiovascular:      Rate and Rhythm: Normal rate and regular rhythm. Heart sounds: Normal heart sounds. Pulmonary:      Effort: Pulmonary effort is normal.      Breath sounds: Normal breath sounds. Abdominal:      General: Bowel sounds are normal.      Palpations: Abdomen is soft. Musculoskeletal:         General: Normal range of motion. Cervical back: Normal range of motion and neck supple. Skin:     General: Skin is warm and dry. Findings: No abrasion or rash. Neurological:      Mental Status: She is alert and oriented to person, place, and time.       Cranial Nerves: No cranial nerve deficit. Sensory: No sensory deficit. Coordination: Coordination normal.   Psychiatric:         Behavior: Behavior normal.         Thought Content: Thought content normal.         Judgment: Judgment normal.       On this date 10/10/2022 I have spent 40 minutes reviewing previous notes, test results and face to face with the patient discussing the diagnosis and importance of compliance with the treatment plan as well as documenting on the day of the visit. An electronic signature was used to authenticate this note.   -- Deb Rubio MD

## 2022-10-10 ENCOUNTER — OFFICE VISIT (OUTPATIENT)
Dept: INTERNAL MEDICINE CLINIC | Age: 82
End: 2022-10-10
Payer: COMMERCIAL

## 2022-10-10 VITALS
HEIGHT: 64 IN | WEIGHT: 118 LBS | OXYGEN SATURATION: 91 % | RESPIRATION RATE: 14 BRPM | TEMPERATURE: 97.8 F | HEART RATE: 84 BPM | SYSTOLIC BLOOD PRESSURE: 128 MMHG | BODY MASS INDEX: 20.14 KG/M2 | DIASTOLIC BLOOD PRESSURE: 81 MMHG

## 2022-10-10 DIAGNOSIS — G20 PARKINSON'S DISEASE (HCC): ICD-10-CM

## 2022-10-10 DIAGNOSIS — J44.9 CHRONIC OBSTRUCTIVE PULMONARY DISEASE, UNSPECIFIED COPD TYPE (HCC): Primary | ICD-10-CM

## 2022-10-10 DIAGNOSIS — R27.8 SENSORY ATAXIA: ICD-10-CM

## 2022-10-10 DIAGNOSIS — I10 ESSENTIAL HYPERTENSION: ICD-10-CM

## 2022-10-10 DIAGNOSIS — K21.9 GASTROESOPHAGEAL REFLUX DISEASE WITHOUT ESOPHAGITIS: ICD-10-CM

## 2022-10-10 PROCEDURE — 99215 OFFICE O/P EST HI 40 MIN: CPT | Performed by: INTERNAL MEDICINE

## 2022-10-10 PROCEDURE — G8399 PT W/DXA RESULTS DOCUMENT: HCPCS | Performed by: INTERNAL MEDICINE

## 2022-10-10 PROCEDURE — G8754 DIAS BP LESS 90: HCPCS | Performed by: INTERNAL MEDICINE

## 2022-10-10 PROCEDURE — G8536 NO DOC ELDER MAL SCRN: HCPCS | Performed by: INTERNAL MEDICINE

## 2022-10-10 PROCEDURE — 1090F PRES/ABSN URINE INCON ASSESS: CPT | Performed by: INTERNAL MEDICINE

## 2022-10-10 PROCEDURE — G8752 SYS BP LESS 140: HCPCS | Performed by: INTERNAL MEDICINE

## 2022-10-10 PROCEDURE — 1101F PT FALLS ASSESS-DOCD LE1/YR: CPT | Performed by: INTERNAL MEDICINE

## 2022-10-10 PROCEDURE — G8420 CALC BMI NORM PARAMETERS: HCPCS | Performed by: INTERNAL MEDICINE

## 2022-10-10 PROCEDURE — G8510 SCR DEP NEG, NO PLAN REQD: HCPCS | Performed by: INTERNAL MEDICINE

## 2022-10-10 PROCEDURE — G8427 DOCREV CUR MEDS BY ELIG CLIN: HCPCS | Performed by: INTERNAL MEDICINE

## 2022-10-11 LAB
ALBUMIN SERPL-MCNC: 3.9 G/DL (ref 3.5–5)
ALBUMIN/GLOB SERPL: 1.3 {RATIO} (ref 1.1–2.2)
ALP SERPL-CCNC: 106 U/L (ref 45–117)
ALT SERPL-CCNC: 13 U/L (ref 12–78)
ANION GAP SERPL CALC-SCNC: 3 MMOL/L (ref 5–15)
APPEARANCE UR: ABNORMAL
AST SERPL-CCNC: 28 U/L (ref 15–37)
BACTERIA URNS QL MICRO: NEGATIVE /HPF
BASOPHILS # BLD: 0.1 K/UL (ref 0–0.1)
BASOPHILS NFR BLD: 1 % (ref 0–1)
BILIRUB SERPL-MCNC: 1.2 MG/DL (ref 0.2–1)
BILIRUB UR QL: NEGATIVE
BUN SERPL-MCNC: 22 MG/DL (ref 6–20)
BUN/CREAT SERPL: 30 (ref 12–20)
CALCIUM SERPL-MCNC: 9.4 MG/DL (ref 8.5–10.1)
CAOX CRY URNS QL MICRO: ABNORMAL
CHLORIDE SERPL-SCNC: 108 MMOL/L (ref 97–108)
CHOLEST SERPL-MCNC: 189 MG/DL
CO2 SERPL-SCNC: 30 MMOL/L (ref 21–32)
COLOR UR: ABNORMAL
CREAT SERPL-MCNC: 0.74 MG/DL (ref 0.55–1.02)
DIFFERENTIAL METHOD BLD: NORMAL
EOSINOPHIL # BLD: 0.1 K/UL (ref 0–0.4)
EOSINOPHIL NFR BLD: 1 % (ref 0–7)
EPITH CASTS URNS QL MICRO: ABNORMAL /LPF
ERYTHROCYTE [DISTWIDTH] IN BLOOD BY AUTOMATED COUNT: 13.4 % (ref 11.5–14.5)
GLOBULIN SER CALC-MCNC: 2.9 G/DL (ref 2–4)
GLUCOSE SERPL-MCNC: 123 MG/DL (ref 65–100)
GLUCOSE UR STRIP.AUTO-MCNC: NEGATIVE MG/DL
HCT VFR BLD AUTO: 46.8 % (ref 35–47)
HDLC SERPL-MCNC: 71 MG/DL
HDLC SERPL: 2.7 {RATIO} (ref 0–5)
HGB BLD-MCNC: 15 G/DL (ref 11.5–16)
HGB UR QL STRIP: NEGATIVE
IMM GRANULOCYTES # BLD AUTO: 0 K/UL (ref 0–0.04)
IMM GRANULOCYTES NFR BLD AUTO: 0 % (ref 0–0.5)
KETONES UR QL STRIP.AUTO: ABNORMAL MG/DL
LDLC SERPL CALC-MCNC: 88.4 MG/DL (ref 0–100)
LEUKOCYTE ESTERASE UR QL STRIP.AUTO: ABNORMAL
LYMPHOCYTES # BLD: 1.5 K/UL (ref 0.8–3.5)
LYMPHOCYTES NFR BLD: 17 % (ref 12–49)
MCH RBC QN AUTO: 31.7 PG (ref 26–34)
MCHC RBC AUTO-ENTMCNC: 32.1 G/DL (ref 30–36.5)
MCV RBC AUTO: 98.9 FL (ref 80–99)
MONOCYTES # BLD: 0.6 K/UL (ref 0–1)
MONOCYTES NFR BLD: 7 % (ref 5–13)
NEUTS SEG # BLD: 6.8 K/UL (ref 1.8–8)
NEUTS SEG NFR BLD: 74 % (ref 32–75)
NITRITE UR QL STRIP.AUTO: NEGATIVE
NRBC # BLD: 0 K/UL (ref 0–0.01)
NRBC BLD-RTO: 0 PER 100 WBC
PH UR STRIP: 6 [PH] (ref 5–8)
PLATELET # BLD AUTO: 273 K/UL (ref 150–400)
PMV BLD AUTO: 11.2 FL (ref 8.9–12.9)
POTASSIUM SERPL-SCNC: 4.4 MMOL/L (ref 3.5–5.1)
PROT SERPL-MCNC: 6.8 G/DL (ref 6.4–8.2)
PROT UR STRIP-MCNC: 30 MG/DL
RBC # BLD AUTO: 4.73 M/UL (ref 3.8–5.2)
RBC #/AREA URNS HPF: ABNORMAL /HPF (ref 0–5)
SODIUM SERPL-SCNC: 141 MMOL/L (ref 136–145)
SP GR UR REFRACTOMETRY: 1.02 (ref 1–1.03)
TRIGL SERPL-MCNC: 148 MG/DL (ref ?–150)
TSH SERPL DL<=0.05 MIU/L-ACNC: 1.1 UIU/ML (ref 0.36–3.74)
UA: UC IF INDICATED,UAUC: ABNORMAL
UROBILINOGEN UR QL STRIP.AUTO: 0.2 EU/DL (ref 0.2–1)
VLDLC SERPL CALC-MCNC: 29.6 MG/DL
WBC # BLD AUTO: 9.2 K/UL (ref 3.6–11)
WBC URNS QL MICRO: ABNORMAL /HPF (ref 0–4)

## 2022-10-12 LAB
EST. AVERAGE GLUCOSE BLD GHB EST-MCNC: 123 MG/DL
HBA1C MFR BLD: 5.9 % (ref 4–5.6)

## 2022-12-08 ENCOUNTER — OFFICE VISIT (OUTPATIENT)
Dept: NEUROLOGY | Age: 82
End: 2022-12-08
Payer: MEDICARE

## 2022-12-08 VITALS
DIASTOLIC BLOOD PRESSURE: 73 MMHG | RESPIRATION RATE: 22 BRPM | HEART RATE: 74 BPM | SYSTOLIC BLOOD PRESSURE: 147 MMHG | OXYGEN SATURATION: 94 %

## 2022-12-08 DIAGNOSIS — G20 PARKINSON'S DISEASE (HCC): Primary | ICD-10-CM

## 2022-12-08 RX ORDER — CARBIDOPA AND LEVODOPA 25; 100 MG/1; MG/1
TABLET ORAL
Qty: 540 TABLET | Refills: 3 | Status: SHIPPED | OUTPATIENT
Start: 2022-12-08

## 2022-12-08 NOTE — PROGRESS NOTES
[facility] and [address] at [facility]ics at [facility], [address] [address] [address] [facility], [address] [place] [address]   [phone]              Chief Complaint   Patient presents with    Parkinsons Disease     Seems to be worsening, imbalance, blurred vision, and weakness     Current Outpatient Medications   Medication Sig Dispense Refill    carbidopa-levodopa (Sinemet)  mg per tablet 1 po at 7am, 11 am, 3pm and 7pm 360 Tablet 3    omeprazole (PRILOSEC) 20 mg capsule Take 1 Capsule by mouth daily. 90 Capsule 2    azithromycin (ZITHROMAX) 250 mg tablet TAKE 1 TABLET BY MOUTH THREE TIMES A WEEK      ascorbic acid, vitamin C, (VITAMIN C) 500 mg tablet Take  by mouth. turmeric 400 mg cap Take  by mouth. albuterol (PROVENTIL VENTOLIN) 2.5 mg /3 mL (0.083 %) nebulizer solution by Nebulization route once. rosuvastatin (CRESTOR) 20 mg tablet Take 20 mg by mouth nightly. budesonide-formoterol (SYMBICORT) 160-4.5 mcg/actuation HFA inhaler Take 2 Puffs by inhalation two (2) times a day. 3 Inhaler 1    magnesium 250 mg tab Take 500 mg by mouth daily. Indications: patient is only taking 250mg daily      aclidinium bromide (TUDORZA PRESSAIR) 400 mcg/actuation inhaler Take 1 Puff by inhalation daily. cholecalciferol (VITAMIN D3) (1000 Units /25 mcg) tablet Take 1,000 Units by mouth daily. albuterol (PROVENTIL HFA, VENTOLIN HFA) 90 mcg/actuation inhaler Take 2 Puffs by inhalation every four (4) hours as needed for Wheezing. 1 Inhaler 2    ASPIRIN 81 mg chewable tablet Take 81 mg by mouth every other day.         Allergies   Allergen Reactions    Augmentin [Amoxicillin-Pot Clavulanate] Nausea Only     Social History     Tobacco Use    Smoking status: Former     Packs/day: 1.00     Years: 20.00     Pack years: 20.00     Types: Cigarettes     Quit date: 1/1/1980     Years since quittin.9    Smokeless tobacco: Never   Substance Use Topics    Alcohol use: Yes     Alcohol/week: 1.0 standard drink     Types: 1 Glasses of wine per week     Comment: daily glass of wine    Drug use: No     71-year-old lady following up for Parkinson's. I saw her last in  and at that time she looked well from a parkinsonian perspective and we continued Sinemet. She also had dysesthesias in her feet. EMG 2019 no evidence of large fiber neuropathy  MRI of the lumbar spine 2020 with multilevel degenerative change  MRI of the cervical spine 2020 with multilevel degenerative change    Today she reports she has had worsening particularly with her gait and balance. She is also getting some hot flashes and she does not think it correlates to wearing off but she is not certain but she will track that. Tolerates the Sinemet without difficulty. Examination  Visit Vitals  BP (!) 147/73 (BP 1 Location: Left upper arm, BP Patient Position: Sitting, BP Cuff Size: Adult)   Pulse 74   Resp 22   SpO2 94%   She is a pleasant lady. She is awake alert and oriented. Normal speech and language. Minimal rest tremor left hand. No cogwheeling. Hand opening and closing a bit worse left as it is foot tapping. She arises and gets her balance and she has a shuffling gait. Impression/Plan  Parkinson's with worsening as above  Increase Sinemet to a dose of 1.5 tablets 7 AM, 11 AM, 3 PM and 7 PM  Follow-up 6-8 weeks and if she is doing better we will send her to Parkinson's big therapy    Milly Martinez MD        This note was created using voice recognition software. Despite editing, there may be syntax errors.

## 2022-12-09 ENCOUNTER — TELEPHONE (OUTPATIENT)
Dept: NEUROLOGY | Age: 82
End: 2022-12-09

## 2022-12-09 DIAGNOSIS — G20 PARKINSON'S DISEASE (HCC): ICD-10-CM

## 2022-12-09 NOTE — TELEPHONE ENCOUNTER
Matthew Bell from [address] [facility] said they received 2 sets of instructions for Sinemet. They need clarification.

## 2022-12-12 RX ORDER — CARBIDOPA AND LEVODOPA 25; 100 MG/1; MG/1
TABLET ORAL
Qty: 540 TABLET | Refills: 3 | Status: SHIPPED | OUTPATIENT
Start: 2022-12-12

## 2023-01-12 PROBLEM — G20 PARKINSON'S DISEASE (HCC): Status: ACTIVE | Noted: 2023-01-12

## 2023-01-12 PROBLEM — G20.A1 PARKINSON'S DISEASE: Status: ACTIVE | Noted: 2023-01-12

## 2023-01-12 NOTE — PROGRESS NOTES
Mary Kelsey (: 1940) is a [de-identified] y.o. female, established patient, here for evaluation of the following chief complaint(s):  Ear Fullness       ASSESSMENT/PLAN:  Below is the assessment and plan developed based on review of pertinent history, physical exam, labs, studies, and medications. 1. Otalgia, unspecified laterality-no signs of infection she does have some wax in the right ear. Told her to try Debrox  2. Parkinson's disease (HCC)-continue with Sinemet 1-1/2 tablets and if this does not improve Dr. Reggie Ruiz may send her to a specific physical therapy for PD  3. Chronic obstructive pulmonary disease, unspecified COPD type (HCC)-continue with albuterol nebs, Symbicort, Tudorza and still waiting on Tyvaso DPI approval  4. Essential hypertension-stable off meds  5. Gastroesophageal reflux disease without esophagitis-she is doing well on Prilosec  6. Sensory ataxia-this is a combination of Parkinson's disease and spinal stenosis and is not getting better but she is managing with a cane. She is still meeting friends for quilting, cards and other activities during the week. She sometimes is restricted because of her balance and breathing but she is continuing to have quality of life with her family and friends  7. Spinal stenosis of lumbar region with neurogenic claudication-continue walking and maximizing walkers      SUBJECTIVE/OBJECTIVE:  HPI    COPD_she is followed by Dr. Mina Casas -She is s/p EBV placement by IP. At present, she is able to walk a half mile at a slow pace, which she does daily. She is doing OK with ADLs and with grocery shopping. She has not had recent LE edema, generally improved. She does have exertional chest pressure and heaviness. She also has exertional palpitations - these palpitations/rapid heart rate limit her exertion often.    She asked about treatment of her known PH and specifically about Tyvaso DPI and he did initiate paperwork to do this;  still working on paperwork    Sensory ataxia- she saw  and was felt there was not a lot more to helps this; her legs and feet do not work and she is frustrated by this ; the left leg does not want to follow the right one and wonders if that is difference     Subjective:   Andrez Brewer is a 80 y.o. female with hypertension. Hypertension ROS: taking medications as instructed, no medication side effects noted, no TIA's, no chest pain on exertion, no dyspnea on exertion, no swelling of ankles. New concerns: 128/81. PD_ cont with sinemet and tolerating medicine but has some left sided weakness still; she does get constipated and takes stool softener and get miralax every day ; he may send her to PD      Subjective:   Andrez Brewer is a 80 y.o. female with hyperlipidemia. Cardiovascular risk analysis - 80 y.o. female LDL goal is under 100. ROS: taking medications as instructed, no medication side effects noted, no TIA's, no chest pain on exertion, no dyspnea on exertion, no swelling of ankles. Tolerating meds, no myalgias or other side effects noted  New concerns: tolerating medicine . Lab Results   Component Value Date/Time     Cholesterol, total 154 04/12/2022 10:14 AM     HDL Cholesterol 77 04/12/2022 10:14 AM     LDL, calculated 55.4 04/12/2022 10:14 AM     VLDL, calculated 21.6 04/12/2022 10:14 AM     Triglyceride 108 04/12/2022 10:14 AM     CHOL/HDL Ratio 2.0 04/12/2022 10:14 AM      Gerd- she cont to take the prilosec     Review of Systems   All other systems reviewed and are negative. Physical Exam  Vitals and nursing note reviewed. Constitutional:       Appearance: She is well-developed. HENT:      Head: Normocephalic and atraumatic. Right Ear: External ear normal.      Left Ear: External ear normal.      Nose: Nose normal.   Neck:      Thyroid: No thyroid mass or thyromegaly. Vascular: No carotid bruit or JVD.    Cardiovascular:      Rate and Rhythm: Normal rate and regular rhythm. Pulses: Normal pulses. Heart sounds: Normal heart sounds, S1 normal and S2 normal. No murmur heard. No friction rub. No gallop. Pulmonary:      Effort: Pulmonary effort is normal.      Breath sounds: Normal breath sounds. Abdominal:      General: Bowel sounds are normal.      Palpations: Abdomen is soft. Musculoskeletal:         General: Normal range of motion. Cervical back: Normal range of motion and neck supple. Skin:     General: Skin is warm and dry. Neurological:      Mental Status: She is alert and oriented to person, place, and time. Psychiatric:         Behavior: Behavior normal.         Thought Content: Thought content normal.         Judgment: Judgment normal.         On this date 01/13/2023 I have spent 30 minutes reviewing previous notes, test results and face to face with the patient discussing the diagnosis and importance of compliance with the treatment plan as well as documenting on the day of the visit. An electronic signature was used to authenticate this note.   -- Nidia Hoffmann MD

## 2023-01-13 ENCOUNTER — OFFICE VISIT (OUTPATIENT)
Dept: INTERNAL MEDICINE CLINIC | Age: 83
End: 2023-01-13
Payer: MEDICARE

## 2023-01-13 VITALS
RESPIRATION RATE: 14 BRPM | HEIGHT: 64 IN | SYSTOLIC BLOOD PRESSURE: 116 MMHG | OXYGEN SATURATION: 92 % | WEIGHT: 121 LBS | BODY MASS INDEX: 20.66 KG/M2 | DIASTOLIC BLOOD PRESSURE: 62 MMHG | HEART RATE: 67 BPM | TEMPERATURE: 97.4 F

## 2023-01-13 DIAGNOSIS — K21.9 GASTROESOPHAGEAL REFLUX DISEASE WITHOUT ESOPHAGITIS: ICD-10-CM

## 2023-01-13 DIAGNOSIS — J44.9 CHRONIC OBSTRUCTIVE PULMONARY DISEASE, UNSPECIFIED COPD TYPE (HCC): ICD-10-CM

## 2023-01-13 DIAGNOSIS — G20 PARKINSON'S DISEASE (HCC): ICD-10-CM

## 2023-01-13 DIAGNOSIS — R27.8 SENSORY ATAXIA: ICD-10-CM

## 2023-01-13 DIAGNOSIS — H92.09 OTALGIA, UNSPECIFIED LATERALITY: Primary | ICD-10-CM

## 2023-01-13 DIAGNOSIS — M48.062 SPINAL STENOSIS OF LUMBAR REGION WITH NEUROGENIC CLAUDICATION: ICD-10-CM

## 2023-01-13 DIAGNOSIS — I10 ESSENTIAL HYPERTENSION: ICD-10-CM

## 2023-01-13 RX ORDER — DIGOXIN 125 MCG
TABLET ORAL
COMMUNITY
Start: 2023-01-11

## 2023-01-17 NOTE — PROGRESS NOTES
Elvi Serrano (: 1940) is a [de-identified] y.o. female, established patient, here for evaluation of the following chief complaint(s):  Ear Fullness       ASSESSMENT/PLAN:  Below is the assessment and plan developed based on review of pertinent history, physical exam, labs, studies, and medications. 1. Otalgia, unspecified laterality-we did clean the ear out but she continues to have fluid and fullness in the ear so told her to try Flonase and a low-dose of Sudafed once a day for the next couple days and see if that improves. If it does not then to get in with ENT  2. Parkinson's disease (HCC)-continue with Sinemet as she is doing tablets 4 times a day  3. Chronic obstructive pulmonary disease, unspecified COPD type (HCC)-continue with albuterol nebs, Symbicort, Tudorza and still waiting on Tyvaso DPI approval  4. Essential hypertension-blood pressure stable  5. Gastroesophageal reflux disease without esophagitis-new with Prilosec and stable dose  6. Sensory ataxia-she is continuing to walk and exercise and using her walker  7. Spinal stenosis of lumbar region with neurogenic claudication-continue exercise working on balance and using the walker      SUBJECTIVE/OBJECTIVE:  HPI    She cannot hear out of her ear and needs it cleaned out    COPD_she is followed by Dr. Renee Rodriguez -She is s/p EBV placement by IP. At present, she is able to walk a half mile at a slow pace, which she does daily. She is doing OK with ADLs and with grocery shopping. She has not had recent LE edema, generally improved. She does have exertional chest pressure and heaviness. She also has exertional palpitations - these palpitations/rapid heart rate limit her exertion often.    She asked about treatment of her known PH and specifically about Tyvaso DPI and he did initiate paperwork to do this;  still working on paperwork     Sensory ataxia- she saw  and was felt there was not a lot more to helps this; her legs and feet do not work and she is frustrated by this ; the left leg does not want to follow the right one and wonders if that is difference     Subjective:   Fadumo Vargas is a 80 y.o. female with hypertension. Hypertension ROS: taking medications as instructed, no medication side effects noted, no TIA's, no chest pain on exertion, no dyspnea on exertion, no swelling of ankles. New concerns:      PD_ cont with sinemet and tolerating medicine but has some left sided weakness still; she does get constipated and takes stool softener and get miralax every day ; he may send her to PD      Subjective:   Fadumo Vargas is a 80 y.o. female with hyperlipidemia. Cardiovascular risk analysis - 80 y.o. female LDL goal is under 100. ROS: taking medications as instructed, no medication side effects noted, no TIA's, no chest pain on exertion, no dyspnea on exertion, no swelling of ankles. Tolerating meds, no myalgias or other side effects noted  New concerns: tolerating medicine . Lab Results   Component Value Date/Time     Cholesterol, total 154 04/12/2022 10:14 AM     HDL Cholesterol 77 04/12/2022 10:14 AM     LDL, calculated 55.4 04/12/2022 10:14 AM     VLDL, calculated 21.6 04/12/2022 10:14 AM     Triglyceride 108 04/12/2022 10:14 AM     CHOL/HDL Ratio 2.0 04/12/2022 10:14 AM      Gerd- she cont to take the prilosec    Review of Systems   All other systems reviewed and are negative. Physical Exam  Vitals and nursing note reviewed. Constitutional:       Appearance: She is well-developed. HENT:      Head: Normocephalic and atraumatic. Right Ear: External ear normal.      Left Ear: External ear normal.      Nose: Nose normal.   Neck:      Thyroid: No thyroid mass or thyromegaly. Vascular: No carotid bruit or JVD. Cardiovascular:      Rate and Rhythm: Normal rate and regular rhythm. Pulses: Normal pulses. Heart sounds: Normal heart sounds, S1 normal and S2 normal. No murmur heard. No friction rub.  No gallop. Pulmonary:      Effort: Pulmonary effort is normal.      Breath sounds: Normal breath sounds. Abdominal:      General: Bowel sounds are normal.      Palpations: Abdomen is soft. Musculoskeletal:         General: Normal range of motion. Cervical back: Normal range of motion and neck supple. Skin:     General: Skin is warm and dry. Neurological:      Mental Status: She is alert and oriented to person, place, and time. Psychiatric:         Behavior: Behavior normal.         Thought Content: Thought content normal.         Judgment: Judgment normal.         On this date 01/18/2023 I have spent 30 minutes reviewing previous notes, test results and face to face with the patient discussing the diagnosis and importance of compliance with the treatment plan as well as documenting on the day of the visit. An electronic signature was used to authenticate this note.   -- Bhavani Saleem MD

## 2023-01-18 ENCOUNTER — OFFICE VISIT (OUTPATIENT)
Dept: INTERNAL MEDICINE CLINIC | Age: 83
End: 2023-01-18
Payer: MEDICARE

## 2023-01-18 VITALS
RESPIRATION RATE: 14 BRPM | OXYGEN SATURATION: 90 % | HEART RATE: 78 BPM | TEMPERATURE: 97.9 F | WEIGHT: 122 LBS | BODY MASS INDEX: 20.83 KG/M2 | DIASTOLIC BLOOD PRESSURE: 71 MMHG | SYSTOLIC BLOOD PRESSURE: 119 MMHG | HEIGHT: 64 IN

## 2023-01-18 DIAGNOSIS — G20 PARKINSON'S DISEASE (HCC): ICD-10-CM

## 2023-01-18 DIAGNOSIS — H92.09 OTALGIA, UNSPECIFIED LATERALITY: Primary | ICD-10-CM

## 2023-01-18 DIAGNOSIS — I10 ESSENTIAL HYPERTENSION: ICD-10-CM

## 2023-01-18 DIAGNOSIS — J44.9 CHRONIC OBSTRUCTIVE PULMONARY DISEASE, UNSPECIFIED COPD TYPE (HCC): ICD-10-CM

## 2023-01-18 DIAGNOSIS — M48.062 SPINAL STENOSIS OF LUMBAR REGION WITH NEUROGENIC CLAUDICATION: ICD-10-CM

## 2023-01-18 DIAGNOSIS — K21.9 GASTROESOPHAGEAL REFLUX DISEASE WITHOUT ESOPHAGITIS: ICD-10-CM

## 2023-01-18 DIAGNOSIS — R27.8 SENSORY ATAXIA: ICD-10-CM

## 2023-01-18 PROCEDURE — 99214 OFFICE O/P EST MOD 30 MIN: CPT | Performed by: INTERNAL MEDICINE

## 2023-01-18 PROCEDURE — 1090F PRES/ABSN URINE INCON ASSESS: CPT | Performed by: INTERNAL MEDICINE

## 2023-01-18 PROCEDURE — 69209 REMOVE IMPACTED EAR WAX UNI: CPT | Performed by: INTERNAL MEDICINE

## 2023-01-18 PROCEDURE — G8510 SCR DEP NEG, NO PLAN REQD: HCPCS | Performed by: INTERNAL MEDICINE

## 2023-01-18 PROCEDURE — G8399 PT W/DXA RESULTS DOCUMENT: HCPCS | Performed by: INTERNAL MEDICINE

## 2023-01-18 PROCEDURE — G8427 DOCREV CUR MEDS BY ELIG CLIN: HCPCS | Performed by: INTERNAL MEDICINE

## 2023-01-18 PROCEDURE — 1101F PT FALLS ASSESS-DOCD LE1/YR: CPT | Performed by: INTERNAL MEDICINE

## 2023-01-18 PROCEDURE — G0463 HOSPITAL OUTPT CLINIC VISIT: HCPCS | Performed by: INTERNAL MEDICINE

## 2023-01-18 PROCEDURE — G8420 CALC BMI NORM PARAMETERS: HCPCS | Performed by: INTERNAL MEDICINE

## 2023-01-18 PROCEDURE — G8536 NO DOC ELDER MAL SCRN: HCPCS | Performed by: INTERNAL MEDICINE

## 2023-01-26 ENCOUNTER — OFFICE VISIT (OUTPATIENT)
Dept: NEUROLOGY | Age: 83
End: 2023-01-26
Payer: MEDICARE

## 2023-01-26 VITALS
SYSTOLIC BLOOD PRESSURE: 149 MMHG | RESPIRATION RATE: 24 BRPM | DIASTOLIC BLOOD PRESSURE: 65 MMHG | OXYGEN SATURATION: 89 % | HEART RATE: 82 BPM

## 2023-01-26 DIAGNOSIS — G24.9 DYSKINESIA DUE TO PARKINSON'S DISEASE (HCC): ICD-10-CM

## 2023-01-26 DIAGNOSIS — R26.9 GAIT ABNORMALITY: Primary | ICD-10-CM

## 2023-01-26 DIAGNOSIS — G20 DYSKINESIA DUE TO PARKINSON'S DISEASE (HCC): ICD-10-CM

## 2023-01-26 DIAGNOSIS — G20 PARKINSON'S DISEASE (HCC): ICD-10-CM

## 2023-01-26 RX ORDER — TREPROSTINIL 16-32-48
KIT INHALATION
COMMUNITY
Start: 2023-01-17

## 2023-01-26 RX ORDER — CARBIDOPA AND LEVODOPA 25; 100 MG/1; MG/1
TABLET ORAL
Qty: 540 TABLET | Refills: 3 | Status: SHIPPED | OUTPATIENT
Start: 2023-01-26

## 2023-01-26 NOTE — PROGRESS NOTES
[facility] and [address] at [facility] at [address] [facility], [address] [place] [address] [facility], [address] [place] [address]   [phone]              No chief complaint on file. Current Outpatient Medications   Medication Sig Dispense Refill    Tyvaso DPI 16(112)-32(112) -48(28) mcg CtDv       digoxin (LANOXIN) 0.125 mg tablet       carbidopa-levodopa (Sinemet)  mg per tablet 1.5 po at 7am, 11 am, 3pm and 7pm  Indications: Parkinson's disease 540 Tablet 3    omeprazole (PRILOSEC) 20 mg capsule Take 1 Capsule by mouth daily. 90 Capsule 2    azithromycin (ZITHROMAX) 250 mg tablet TAKE 1 TABLET BY MOUTH THREE TIMES A WEEK      ascorbic acid, vitamin C, (VITAMIN C) 500 mg tablet Take  by mouth. turmeric 400 mg cap Take  by mouth. albuterol (PROVENTIL VENTOLIN) 2.5 mg /3 mL (0.083 %) nebulizer solution by Nebulization route once. rosuvastatin (CRESTOR) 20 mg tablet Take 20 mg by mouth nightly. budesonide-formoterol (SYMBICORT) 160-4.5 mcg/actuation HFA inhaler Take 2 Puffs by inhalation two (2) times a day. 3 Inhaler 1    magnesium 250 mg tab Take 500 mg by mouth daily. Indications: patient is only taking 250mg daily      aclidinium bromide (TUDORZA PRESSAIR) 400 mcg/actuation inhaler Take 1 Puff by inhalation daily. cholecalciferol (VITAMIN D3) (1000 Units /25 mcg) tablet Take 1,000 Units by mouth daily. albuterol (PROVENTIL HFA, VENTOLIN HFA) 90 mcg/actuation inhaler Take 2 Puffs by inhalation every four (4) hours as needed for Wheezing. 1 Inhaler 2    ASPIRIN 81 mg chewable tablet Take 81 mg by mouth every other day.         Allergies   Allergen Reactions    Augmentin [Amoxicillin-Pot Clavulanate] Nausea Only     Social History     Tobacco Use    Smoking status: Former     Packs/day: 1.00     Years: 20.00     Pack years: 20.00     Types: Cigarettes     Quit date: 1/1/1980 Years since quittin.0    Smokeless tobacco: Never   Substance Use Topics    Alcohol use: Yes     Alcohol/week: 1.0 standard drink     Types: 1 Glasses of wine per week     Comment: daily glass of wine    Drug use: No     63-year-old lady who comes today for follow-up Parkinson's. Last visit we increased her Sinemet to a dose of 1.5 tablets at 7, 11, 3 and 7. Today she reports worsening balance. Tolerates medicine without difficulty. Having dyskinesia right around 6:00 right before her 7 PM dose. She just started a new medication for her pulmonary hypertension. Examination  Visit Vitals  BP (!) 149/65 (BP 1 Location: Left upper arm, BP Patient Position: Sitting, BP Cuff Size: Adult)   Pulse 82   Resp 24   SpO2 (!) 89% Comment: typically has low PO when walking     She is a very pleasant and engaging lady who is very enjoyable to spend time with. She is awake alert and oriented. Speech and language normal.  No visible tremor today. She is able to arise from the chair unassisted. She has a shuffling gait left more than right. Her stride is a bit better from my recollection. Reflexes are 1-2+ and symmetrical    Impression/Plan  Parkinson's and gait disorder with some worsening balance and some dyskinesia likely representative of wearing off  Increase Sinemet to a dose of 2 tablets at the same interval  Will send out therapist to her home  Follow-up 3 months    Pearl Encarnacion MD        This note was created using voice recognition software. Despite editing, there may be syntax errors.

## 2023-05-04 ENCOUNTER — OFFICE VISIT (OUTPATIENT)
Dept: NEUROLOGY | Age: 83
End: 2023-05-04
Payer: MEDICARE

## 2023-05-04 VITALS
HEART RATE: 94 BPM | SYSTOLIC BLOOD PRESSURE: 154 MMHG | OXYGEN SATURATION: 92 % | DIASTOLIC BLOOD PRESSURE: 70 MMHG | RESPIRATION RATE: 22 BRPM

## 2023-05-04 DIAGNOSIS — G20 DYSKINESIA DUE TO PARKINSON'S DISEASE (HCC): ICD-10-CM

## 2023-05-04 DIAGNOSIS — G20 PARKINSON'S DISEASE (HCC): Primary | ICD-10-CM

## 2023-05-04 DIAGNOSIS — R26.9 GAIT ABNORMALITY: ICD-10-CM

## 2023-05-04 DIAGNOSIS — G24.9 DYSKINESIA DUE TO PARKINSON'S DISEASE (HCC): ICD-10-CM

## 2023-05-04 PROCEDURE — 3077F SYST BP >= 140 MM HG: CPT | Performed by: PSYCHIATRY & NEUROLOGY

## 2023-05-04 PROCEDURE — 1090F PRES/ABSN URINE INCON ASSESS: CPT | Performed by: PSYCHIATRY & NEUROLOGY

## 2023-05-04 PROCEDURE — G8399 PT W/DXA RESULTS DOCUMENT: HCPCS | Performed by: PSYCHIATRY & NEUROLOGY

## 2023-05-04 PROCEDURE — G8420 CALC BMI NORM PARAMETERS: HCPCS | Performed by: PSYCHIATRY & NEUROLOGY

## 2023-05-04 PROCEDURE — G8427 DOCREV CUR MEDS BY ELIG CLIN: HCPCS | Performed by: PSYCHIATRY & NEUROLOGY

## 2023-05-04 PROCEDURE — 1101F PT FALLS ASSESS-DOCD LE1/YR: CPT | Performed by: PSYCHIATRY & NEUROLOGY

## 2023-05-04 PROCEDURE — 3078F DIAST BP <80 MM HG: CPT | Performed by: PSYCHIATRY & NEUROLOGY

## 2023-05-04 PROCEDURE — G8536 NO DOC ELDER MAL SCRN: HCPCS | Performed by: PSYCHIATRY & NEUROLOGY

## 2023-05-04 PROCEDURE — 1123F ACP DISCUSS/DSCN MKR DOCD: CPT | Performed by: PSYCHIATRY & NEUROLOGY

## 2023-05-04 PROCEDURE — 99214 OFFICE O/P EST MOD 30 MIN: CPT | Performed by: PSYCHIATRY & NEUROLOGY

## 2023-05-04 PROCEDURE — G8510 SCR DEP NEG, NO PLAN REQD: HCPCS | Performed by: PSYCHIATRY & NEUROLOGY

## 2023-09-23 SDOH — ECONOMIC STABILITY: HOUSING INSECURITY
IN THE LAST 12 MONTHS, WAS THERE A TIME WHEN YOU DID NOT HAVE A STEADY PLACE TO SLEEP OR SLEPT IN A SHELTER (INCLUDING NOW)?: NO

## 2023-09-23 SDOH — ECONOMIC STABILITY: INCOME INSECURITY: HOW HARD IS IT FOR YOU TO PAY FOR THE VERY BASICS LIKE FOOD, HOUSING, MEDICAL CARE, AND HEATING?: NOT HARD AT ALL

## 2023-09-23 SDOH — ECONOMIC STABILITY: FOOD INSECURITY: WITHIN THE PAST 12 MONTHS, YOU WORRIED THAT YOUR FOOD WOULD RUN OUT BEFORE YOU GOT MONEY TO BUY MORE.: NEVER TRUE

## 2023-09-23 SDOH — ECONOMIC STABILITY: FOOD INSECURITY: WITHIN THE PAST 12 MONTHS, THE FOOD YOU BOUGHT JUST DIDN'T LAST AND YOU DIDN'T HAVE MONEY TO GET MORE.: NEVER TRUE

## 2023-09-23 SDOH — ECONOMIC STABILITY: TRANSPORTATION INSECURITY
IN THE PAST 12 MONTHS, HAS LACK OF TRANSPORTATION KEPT YOU FROM MEETINGS, WORK, OR FROM GETTING THINGS NEEDED FOR DAILY LIVING?: NO

## 2023-09-25 ENCOUNTER — OFFICE VISIT (OUTPATIENT)
Age: 83
End: 2023-09-25
Payer: MEDICARE

## 2023-09-25 VITALS
SYSTOLIC BLOOD PRESSURE: 123 MMHG | WEIGHT: 116 LBS | TEMPERATURE: 98.1 F | OXYGEN SATURATION: 82 % | RESPIRATION RATE: 161 BRPM | BODY MASS INDEX: 19.81 KG/M2 | HEIGHT: 64 IN | HEART RATE: 93 BPM | DIASTOLIC BLOOD PRESSURE: 71 MMHG

## 2023-09-25 DIAGNOSIS — K59.09 OTHER CONSTIPATION: ICD-10-CM

## 2023-09-25 DIAGNOSIS — S72.002S CLOSED FRACTURE OF NECK OF LEFT FEMUR, SEQUELA: Primary | ICD-10-CM

## 2023-09-25 DIAGNOSIS — J44.9 CHRONIC OBSTRUCTIVE PULMONARY DISEASE, UNSPECIFIED COPD TYPE (HCC): ICD-10-CM

## 2023-09-25 DIAGNOSIS — I10 ESSENTIAL (PRIMARY) HYPERTENSION: ICD-10-CM

## 2023-09-25 DIAGNOSIS — M48.062 SPINAL STENOSIS, LUMBAR REGION WITH NEUROGENIC CLAUDICATION: ICD-10-CM

## 2023-09-25 DIAGNOSIS — R27.8 SENSORY ATAXIA: ICD-10-CM

## 2023-09-25 DIAGNOSIS — K21.9 GASTRO-ESOPHAGEAL REFLUX DISEASE WITHOUT ESOPHAGITIS: ICD-10-CM

## 2023-09-25 DIAGNOSIS — G20.A1 PARKINSON'S DISEASE: ICD-10-CM

## 2023-09-25 PROCEDURE — G8427 DOCREV CUR MEDS BY ELIG CLIN: HCPCS | Performed by: INTERNAL MEDICINE

## 2023-09-25 PROCEDURE — 3074F SYST BP LT 130 MM HG: CPT | Performed by: INTERNAL MEDICINE

## 2023-09-25 PROCEDURE — G8420 CALC BMI NORM PARAMETERS: HCPCS | Performed by: INTERNAL MEDICINE

## 2023-09-25 PROCEDURE — G8399 PT W/DXA RESULTS DOCUMENT: HCPCS | Performed by: INTERNAL MEDICINE

## 2023-09-25 PROCEDURE — 99215 OFFICE O/P EST HI 40 MIN: CPT | Performed by: INTERNAL MEDICINE

## 2023-09-25 PROCEDURE — 3078F DIAST BP <80 MM HG: CPT | Performed by: INTERNAL MEDICINE

## 2023-09-25 PROCEDURE — 1123F ACP DISCUSS/DSCN MKR DOCD: CPT | Performed by: INTERNAL MEDICINE

## 2023-09-25 PROCEDURE — 3023F SPIROM DOC REV: CPT | Performed by: INTERNAL MEDICINE

## 2023-09-25 PROCEDURE — 1036F TOBACCO NON-USER: CPT | Performed by: INTERNAL MEDICINE

## 2023-09-25 PROCEDURE — 1090F PRES/ABSN URINE INCON ASSESS: CPT | Performed by: INTERNAL MEDICINE

## 2023-09-25 ASSESSMENT — PATIENT HEALTH QUESTIONNAIRE - PHQ9
SUM OF ALL RESPONSES TO PHQ9 QUESTIONS 1 & 2: 4
SUM OF ALL RESPONSES TO PHQ QUESTIONS 1-9: 9
4. FEELING TIRED OR HAVING LITTLE ENERGY: 1
2. FEELING DOWN, DEPRESSED OR HOPELESS: 2
1. LITTLE INTEREST OR PLEASURE IN DOING THINGS: 2
SUM OF ALL RESPONSES TO PHQ QUESTIONS 1-9: 10
5. POOR APPETITE OR OVEREATING: 1
8. MOVING OR SPEAKING SO SLOWLY THAT OTHER PEOPLE COULD HAVE NOTICED. OR THE OPPOSITE, BEING SO FIGETY OR RESTLESS THAT YOU HAVE BEEN MOVING AROUND A LOT MORE THAN USUAL: 1
9. THOUGHTS THAT YOU WOULD BE BETTER OFF DEAD, OR OF HURTING YOURSELF: 1
7. TROUBLE CONCENTRATING ON THINGS, SUCH AS READING THE NEWSPAPER OR WATCHING TELEVISION: 0
3. TROUBLE FALLING OR STAYING ASLEEP: 1
SUM OF ALL RESPONSES TO PHQ QUESTIONS 1-9: 10
SUM OF ALL RESPONSES TO PHQ QUESTIONS 1-9: 10
6. FEELING BAD ABOUT YOURSELF - OR THAT YOU ARE A FAILURE OR HAVE LET YOURSELF OR YOUR FAMILY DOWN: 1
10. IF YOU CHECKED OFF ANY PROBLEMS, HOW DIFFICULT HAVE THESE PROBLEMS MADE IT FOR YOU TO DO YOUR WORK, TAKE CARE OF THINGS AT HOME, OR GET ALONG WITH OTHER PEOPLE: 1

## 2023-09-25 NOTE — PROGRESS NOTES
Antonio Santillan (:  1940) is a 82 y.o. female,Established patient, here for evaluation of the following chief complaint(s):  Follow-Up from Hospital (Pt here to f/u hospital visit, , operated on , released . Pt was at rehab x1week. )         ASSESSMENT/PLAN:  1. Closed fracture of neck of left femur, sequela-she cont to have PT/OT and does not need pain medicine currently   2. Parkinson's disease (HCC)-continue with Sinemet tolerating medicines but mobility is a major issue since her fall  3. Chronic obstructive pulmonary disease, unspecified COPD type ([address])- not at goal with albuterol nebs, Symbicort, currently on doxycycline and prednisone as having a flare  4. Essential (primary) hypertension-put her on medicines in the hospital but we deferred as she tends to run low and can increase the risk of fall  5. Gastro-esophageal reflux disease without esophagitis can-continue with Prilosec's  6. Sensory ataxia-continue working with PT and OT and using the walker  7. Spinal stenosis, lumbar region with neurogenic claudication-can be working with PT OT using the walker  8. Other constipation-using equate brand to help with her bowels which is slowly moving but due to her mobility is probably harder      No follow-ups on file. Reviewed her hospitalization her labs we deferred on checking lipids continue with PT OT she will get her COVID-vaccine in couple weeks when she is feeling better than the flu shot at the beginning of November and after that we will get the RSV vaccine    Kirst a positive attitude to help with her rehab and patient's  ? Pain, mobllity  Sinemet  with albuterol nebs, Symbicort, Tudorza and still waiting on Tyvaso DPI approval  At goal  Prilosec  [place], exercise  Exercise, walker  Subjective   SUBJECTIVE/OBJECTIVE:  HPI    She was admitted to [facility] on  after sustaining a ground-level fall and not being able to ambulate.   Left hip pelvis x-ray on

## 2023-10-11 NOTE — PROGRESS NOTES
Problem: Falls - Risk of  Goal: *Absence of Falls  Document Marc Fall Risk and appropriate interventions in the flowsheet.    Outcome: Progressing Towards Goal  Fall Risk Interventions:  Mobility Interventions: Assess mobility with egress test         Medication Interventions: Bed/chair exit alarm    Elimination Interventions: Bed/chair exit alarm clipper

## 2023-12-31 ENCOUNTER — TELEPHONE (OUTPATIENT)
Age: 83
End: 2023-12-31

## 2024-01-01 NOTE — TELEPHONE ENCOUNTER
Spoke to patients  . They are stopping all aggressive measures and giving morphine. Family is at peace with decision-   Thank you

## 2024-01-04 ENCOUNTER — TELEPHONE (OUTPATIENT)
Age: 84
End: 2024-01-04

## 2024-01-04 NOTE — TELEPHONE ENCOUNTER
----- Message from Delores Gregg sent at 1/4/2024 10:32 AM EST -----  Subject: Message to Provider    QUESTIONS  Information for Provider? Marzena is requesting a callback from Dr Christine  ---------------------------------------------------------------------------  --------------  CALL BACK INFO  [phone]; OK to leave message on voicemail  ---------------------------------------------------------------------------  --------------  SCRIPT ANSWERS  Relationship to Patient? Spouse/Partner  Representative Name? marzena  Is the representative on the Communication Release of Information (NIGHAT)   form in Epic? Yes